# Patient Record
Sex: FEMALE | Race: BLACK OR AFRICAN AMERICAN | NOT HISPANIC OR LATINO | Employment: FULL TIME | URBAN - METROPOLITAN AREA
[De-identification: names, ages, dates, MRNs, and addresses within clinical notes are randomized per-mention and may not be internally consistent; named-entity substitution may affect disease eponyms.]

---

## 2017-03-02 ENCOUNTER — PATIENT MESSAGE (OUTPATIENT)
Dept: FAMILY MEDICINE | Facility: CLINIC | Age: 37
End: 2017-03-02

## 2019-09-18 ENCOUNTER — TELEPHONE (OUTPATIENT)
Dept: BARIATRICS | Facility: CLINIC | Age: 39
End: 2019-09-18

## 2019-09-18 NOTE — TELEPHONE ENCOUNTER
Called patient to schedule annual f/u appt. Left message with call back number. Pt possibly moved.

## 2020-07-17 ENCOUNTER — APPOINTMENT (RX ONLY)
Dept: URBAN - METROPOLITAN AREA CLINIC 108 | Facility: CLINIC | Age: 40
Setting detail: DERMATOLOGY
End: 2020-07-17

## 2020-07-17 DIAGNOSIS — L29.8 OTHER PRURITUS: ICD-10-CM

## 2020-07-17 DIAGNOSIS — L30.4 ERYTHEMA INTERTRIGO: ICD-10-CM

## 2020-07-17 DIAGNOSIS — L29.89 OTHER PRURITUS: ICD-10-CM

## 2020-07-17 PROCEDURE — 99202 OFFICE O/P NEW SF 15 MIN: CPT

## 2020-07-17 PROCEDURE — ? COUNSELING

## 2020-07-17 PROCEDURE — ? PRESCRIPTION

## 2020-07-17 RX ORDER — HYDROCORTISONE 25 MG/G
CREAM TOPICAL
Qty: 1 | Refills: 3 | Status: ERX | COMMUNITY
Start: 2020-07-17

## 2020-07-17 RX ORDER — KETOCONAZOLE 20 MG/G
CREAM TOPICAL
Qty: 1 | Refills: 3 | Status: ERX | COMMUNITY
Start: 2020-07-17

## 2020-07-17 RX ADMIN — KETOCONAZOLE: 20 CREAM TOPICAL at 00:00

## 2020-07-17 RX ADMIN — HYDROCORTISONE: 25 CREAM TOPICAL at 00:00

## 2020-07-17 ASSESSMENT — LOCATION DETAILED DESCRIPTION DERM
LOCATION DETAILED: GLUTEAL CLEFT
LOCATION DETAILED: MONS PUBIS
LOCATION DETAILED: INFERIOR THORACIC SPINE

## 2020-07-17 ASSESSMENT — LOCATION ZONE DERM
LOCATION ZONE: VULVA
LOCATION ZONE: TRUNK

## 2020-07-17 ASSESSMENT — LOCATION SIMPLE DESCRIPTION DERM
LOCATION SIMPLE: UPPER BACK
LOCATION SIMPLE: GROIN
LOCATION SIMPLE: GLUTEAL CLEFT

## 2020-08-22 ENCOUNTER — OFFICE VISIT (OUTPATIENT)
Dept: URGENT CARE | Facility: CLINIC | Age: 40
End: 2020-08-22
Payer: OTHER GOVERNMENT

## 2020-08-22 VITALS
HEART RATE: 72 BPM | TEMPERATURE: 97 F | RESPIRATION RATE: 18 BRPM | HEIGHT: 60 IN | SYSTOLIC BLOOD PRESSURE: 130 MMHG | OXYGEN SATURATION: 97 % | DIASTOLIC BLOOD PRESSURE: 62 MMHG | BODY MASS INDEX: 34.93 KG/M2 | WEIGHT: 177.94 LBS

## 2020-08-22 DIAGNOSIS — G44.209 ACUTE NON INTRACTABLE TENSION-TYPE HEADACHE: ICD-10-CM

## 2020-08-22 DIAGNOSIS — R05.9 COUGH: ICD-10-CM

## 2020-08-22 PROCEDURE — U0003 INFECTIOUS AGENT DETECTION BY NUCLEIC ACID (DNA OR RNA); SEVERE ACUTE RESPIRATORY SYNDROME CORONAVIRUS 2 (SARS-COV-2) (CORONAVIRUS DISEASE [COVID-19]), AMPLIFIED PROBE TECHNIQUE, MAKING USE OF HIGH THROUGHPUT TECHNOLOGIES AS DESCRIBED BY CMS-2020-01-R: HCPCS

## 2020-08-22 PROCEDURE — 99214 OFFICE O/P EST MOD 30 MIN: CPT | Mod: TIER,S$GLB,, | Performed by: NURSE PRACTITIONER

## 2020-08-22 PROCEDURE — 99214 PR OFFICE/OUTPT VISIT, EST, LEVL IV, 30-39 MIN: ICD-10-PCS | Mod: TIER,S$GLB,, | Performed by: NURSE PRACTITIONER

## 2020-08-22 RX ORDER — GABAPENTIN 100 MG/1
CAPSULE ORAL
COMMUNITY
Start: 2020-04-23 | End: 2020-08-25

## 2020-08-22 RX ORDER — MINERAL OIL
180 ENEMA (ML) RECTAL
COMMUNITY
End: 2021-11-11

## 2020-08-22 RX ORDER — ATORVASTATIN CALCIUM 40 MG/1
40 TABLET, FILM COATED ORAL
COMMUNITY
Start: 2019-12-22 | End: 2021-11-02 | Stop reason: SDUPTHER

## 2020-08-22 RX ORDER — ATENOLOL 50 MG/1
TABLET ORAL
COMMUNITY
Start: 2020-07-23 | End: 2021-11-11

## 2020-08-22 RX ORDER — FERROUS SULFATE, DRIED 160(50) MG
1 TABLET, EXTENDED RELEASE ORAL
COMMUNITY
End: 2021-11-11

## 2020-08-22 RX ORDER — FOLIC ACID 1 MG/1
1 TABLET ORAL
COMMUNITY
End: 2020-08-25

## 2020-08-22 NOTE — PATIENT INSTRUCTIONS
Tension Headache    A muscle tension headache is a very common cause of head pain. Its also called a stress headache. When some people are under stress, they tense the muscles of their shoulder, neck, and scalp without knowing it. If this tension lasts long enough, a headache can occur. A tension headache can be quite painful. It can last for hours or even days.  Home care  Follow these tips when caring for yourself at home:  · Dont drive yourself home if you were given pain medicine for your headache. Instead, have someone else drive you home. Try to sleep when you get home. You should feel much better when you wake up.  · Put heat on the back of your neck to help ease neck spasm.  · Drink only clear liquids or eat a light diet until your symptoms get better. This will help you avoid nausea or vomiting.  How to prevent headaches  · Figure out what is causing stress in your life. Learn new ways to handle your stress. Ideas include regular exercise, biofeedback, self-hypnosis, yoga, and meditation. Talk with your healthcare provider to find out more information about managing stress. Many books and digital media are also available on this subject.  · Take time out at the first sign of a tension headache, if possible. Take yourself out of the stressful situation. Find a quiet, comfortable place to sit or lie down and let yourself relax. Heat and deep massage of the tight areas in the neck and shoulders may help ease muscle spasm. You may also get relief from a medicine like ibuprofen or a prescribed muscle relaxant.  Follow-up care  Follow up with your healthcare provider, or as advised. Talk with your provider if you have frequent headaches. He or she can figure out a treatment plan. Ask if you can have medicine to take at home the next time you get a bad headache. This may keep you from having to visit the emergency department in the future. You may need to see a headache specialist (neurologist) if you continue  to have headaches.  When to seek medical advice  Call your healthcare provider right away if any of these occur:  · Your head pain gets worse during sexual intercourse or strenuous activity  · Your head pain doesnt get better within 24 hours  · You arent able to keep liquids down (repeated vomiting)  · Fever of 100.4ºF (38ºC) or higher, or as directed by your healthcare provider  · Stiff neck  · Extreme drowsiness, confusion, or fainting  · Dizziness or dizziness with spinning sensation (vertigo)  · Weakness in an arm or leg or one side of your face  · You have difficulty speaking  · Your vision changes  Date Last Reviewed: 8/1/2016  © 9406-7522 [employer]. [address] [place] [address], [place] [address]. All rights reserved. This information is not intended as a substitute for professional medical care. Always follow your healthcare professional's instructions.

## 2020-08-22 NOTE — PROGRESS NOTES
"Subjective:       Patient ID: Chasity Kwan is a 40 y.o. female.    Vitals:  height is 4' 11.5" (1.511 m) and weight is 80.7 kg (177 lb 14.6 oz). Her temperature is 97.4 °F (36.3 °C). Her blood pressure is 130/62 and her pulse is 72. Her respiration is 18 and oxygen saturation is 97%.     Chief Complaint: Back Pain    Patient here today with c/o HALL x's 2 days, nasal congestion, back pain since Tuesday.    Provider note begins below:    No fever or chills. No cough or SOB. No nausea but several bouts diarrhea. No anosmia or ageusia.    HA not relieved by Tylenol, but better with Aleve. Chronic allergy but worse nasal congestion than typical.    No known sick contacts.    Back Pain  This is a new problem. The current episode started in the past 7 days. The problem is unchanged. The pain is present in the thoracic spine. The quality of the pain is described as aching. The pain does not radiate. The pain is at a severity of 5/10. The pain is moderate. The symptoms are aggravated by bending. Associated symptoms include headaches. Pertinent negatives include no abdominal pain, bladder incontinence, bowel incontinence, chest pain, dysuria, fever, leg pain, numbness, paresis, pelvic pain, perianal numbness, tingling, weakness or weight loss. She has tried NSAIDs for the symptoms. The treatment provided no relief.       Constitution: Negative for chills, fatigue and fever.   HENT: Positive for congestion. Negative for sore throat.    Neck: Negative for painful lymph nodes.   Cardiovascular: Negative for chest pain and leg swelling.   Eyes: Negative for double vision and blurred vision.   Respiratory: Negative for cough and shortness of breath.    Gastrointestinal: Negative for abdominal pain, nausea, vomiting, diarrhea and bowel incontinence.   Genitourinary: Negative for dysuria, frequency, urgency, bladder incontinence, history of kidney stones and pelvic pain.   Musculoskeletal: Positive for back pain. " Negative for joint pain, joint swelling, muscle cramps and muscle ache.   Skin: Negative for color change, pale, rash and bruising.   Allergic/Immunologic: Negative for seasonal allergies.   Neurological: Positive for headaches. Negative for dizziness, history of vertigo, light-headedness, passing out and numbness.   Hematologic/Lymphatic: Negative for swollen lymph nodes.   Psychiatric/Behavioral: Negative for nervous/anxious, sleep disturbance and depression. The patient is not nervous/anxious.        Objective:      Physical Exam   Constitutional: She is oriented to person, place, and time. She appears well-developed. She is cooperative.  Non-toxic appearance. She does not appear ill. No distress.   HENT:   Head: Normocephalic and atraumatic.   Ears:   Right Ear: Hearing and external ear normal.   Left Ear: Hearing and external ear normal.   Nose: Mucosal edema present. No rhinorrhea or nasal deformity. No epistaxis.   Mouth/Throat: Uvula is midline, oropharynx is clear and moist and mucous membranes are normal. No trismus in the jaw. Normal dentition. No uvula swelling. No oropharyngeal exudate, posterior oropharyngeal edema or posterior oropharyngeal erythema.   Eyes: Conjunctivae and lids are normal. No scleral icterus. extraocular movement intactvision grossly intact  Neck: Trachea normal, full passive range of motion without pain and phonation normal. Neck supple. No neck rigidity. No edema and no erythema present.   Cardiovascular: Normal rate, regular rhythm, S1 normal, S2 normal, normal heart sounds and normal pulses.   No murmur heard.  Pulmonary/Chest: Effort normal and breath sounds normal. No stridor. No respiratory distress. She has no decreased breath sounds. She has no wheezes. She has no rhonchi. She has no rales.   Abdominal: Normal appearance.   Musculoskeletal: Normal range of motion.         General: No deformity.   Neurological: She is alert and oriented to person, place, and time. She has  intact cranial nerves. She displays no weakness, no tremor and facial symmetry. She exhibits normal muscle tone. Gait normal. Coordination normal.   Skin: Skin is warm, dry, intact, not diaphoretic and not pale. Psychiatric: Her speech is normal and behavior is normal. Judgment and thought content normal.   Nursing note and vitals reviewed.        Assessment:       1. Cough    2. Acute non intractable tension-type headache        Plan:         Cough  -     COVID-19 Routine Screening    Acute non intractable tension-type headache  -     COVID-19 Routine Screening      Patient Instructions     Tension Headache    A muscle tension headache is a very common cause of head pain. Its also called a stress headache. When some people are under stress, they tense the muscles of their shoulder, neck, and scalp without knowing it. If this tension lasts long enough, a headache can occur. A tension headache can be quite painful. It can last for hours or even days.  Home care  Follow these tips when caring for yourself at home:  · Dont drive yourself home if you were given pain medicine for your headache. Instead, have someone else drive you home. Try to sleep when you get home. You should feel much better when you wake up.  · Put heat on the back of your neck to help ease neck spasm.  · Drink only clear liquids or eat a light diet until your symptoms get better. This will help you avoid nausea or vomiting.  How to prevent headaches  · Figure out what is causing stress in your life. Learn new ways to handle your stress. Ideas include regular exercise, biofeedback, self-hypnosis, yoga, and meditation. Talk with your healthcare provider to find out more information about managing stress. Many books and digital media are also available on this subject.  · Take time out at the first sign of a tension headache, if possible. Take yourself out of the stressful situation. Find a quiet, comfortable place to sit or lie down and let  yourself relax. Heat and deep massage of the tight areas in the neck and shoulders may help ease muscle spasm. You may also get relief from a medicine like ibuprofen or a prescribed muscle relaxant.  Follow-up care  Follow up with your healthcare provider, or as advised. Talk with your provider if you have frequent headaches. He or she can figure out a treatment plan. Ask if you can have medicine to take at home the next time you get a bad headache. This may keep you from having to visit the emergency department in the future. You may need to see a headache specialist (neurologist) if you continue to have headaches.  When to seek medical advice  Call your healthcare provider right away if any of these occur:  · Your head pain gets worse during sexual intercourse or strenuous activity  · Your head pain doesnt get better within 24 hours  · You arent able to keep liquids down (repeated vomiting)  · Fever of 100.4ºF (38ºC) or higher, or as directed by your healthcare provider  · Stiff neck  · Extreme drowsiness, confusion, or fainting  · Dizziness or dizziness with spinning sensation (vertigo)  · Weakness in an arm or leg or one side of your face  · You have difficulty speaking  · Your vision changes  Date Last Reviewed: 8/1/2016  © 9567-8669 [employer]. [address] [place] [address], [place] [address]. All rights reserved. This information is not intended as a substitute for professional medical care. Always follow your healthcare professional's instructions.

## 2020-08-23 LAB — SARS-COV-2 RNA RESP QL NAA+PROBE: NOT DETECTED

## 2020-08-24 ENCOUNTER — TELEPHONE (OUTPATIENT)
Dept: NEUROLOGY | Facility: CLINIC | Age: 40
End: 2020-08-24

## 2020-08-24 NOTE — TELEPHONE ENCOUNTER
----- Message from Nikkie Marquis sent at 8/24/2020  1:37 PM CDT -----  Pt was a prior pt moved back to [place]. Would like to schedule an appt. Asking for a call back.      Contact info [phone]

## 2020-08-25 ENCOUNTER — OFFICE VISIT (OUTPATIENT)
Dept: NEUROLOGY | Facility: CLINIC | Age: 40
End: 2020-08-25
Payer: OTHER GOVERNMENT

## 2020-08-25 ENCOUNTER — TELEPHONE (OUTPATIENT)
Dept: URGENT CARE | Facility: CLINIC | Age: 40
End: 2020-08-25

## 2020-08-25 VITALS
SYSTOLIC BLOOD PRESSURE: 116 MMHG | WEIGHT: 188.94 LBS | HEART RATE: 66 BPM | HEIGHT: 60 IN | DIASTOLIC BLOOD PRESSURE: 85 MMHG | BODY MASS INDEX: 37.09 KG/M2

## 2020-08-25 DIAGNOSIS — G93.2 PSEUDOTUMOR CEREBRI: Primary | ICD-10-CM

## 2020-08-25 PROCEDURE — 99204 PR OFFICE/OUTPT VISIT, NEW, LEVL IV, 45-59 MIN: ICD-10-PCS | Mod: S$PBB,,, | Performed by: NEUROMUSCULOSKELETAL MEDICINE & OMM

## 2020-08-25 PROCEDURE — 99204 OFFICE O/P NEW MOD 45 MIN: CPT | Mod: S$PBB,,, | Performed by: NEUROMUSCULOSKELETAL MEDICINE & OMM

## 2020-08-25 PROCEDURE — 99999 PR PBB SHADOW E&M-EST. PATIENT-LVL III: CPT | Mod: PBBFAC,,, | Performed by: NEUROMUSCULOSKELETAL MEDICINE & OMM

## 2020-08-25 PROCEDURE — 99999 PR PBB SHADOW E&M-EST. PATIENT-LVL III: ICD-10-PCS | Mod: PBBFAC,,, | Performed by: NEUROMUSCULOSKELETAL MEDICINE & OMM

## 2020-08-25 PROCEDURE — 99213 OFFICE O/P EST LOW 20 MIN: CPT | Mod: PBBFAC,PO | Performed by: NEUROMUSCULOSKELETAL MEDICINE & OMM

## 2020-08-25 RX ORDER — LAMOTRIGINE 100 MG/1
TABLET ORAL
COMMUNITY
Start: 2020-06-24 | End: 2021-11-11

## 2020-08-25 RX ORDER — BUTALBITAL, ACETAMINOPHEN AND CAFFEINE 50; 325; 40 MG/1; MG/1; MG/1
1 TABLET ORAL
COMMUNITY
Start: 2020-08-23 | End: 2020-09-12

## 2020-08-25 NOTE — TELEPHONE ENCOUNTER
Called patient to follow up from her visit on 8/22. Patient stated she was at neurologist appointment

## 2020-08-25 NOTE — PROGRESS NOTES
This note was dictated with Modal Fluency a word recognition program. There are occasionally word recognition errors which are missed on review.  Chasitygilbert Sevilla Aquiles  1980  Review of patient's allergies indicates:  No Known Allergies  [unfilled]    Past Medical History:   Diagnosis Date    Abnormal Pap smear 2009    colpo    Genital herpes in women     History of uterine fibroid     Hyperlipidemia     Hypertension     Morbid obesity     Pseudotumor cerebri      Social History     Socioeconomic History    Marital status: Single     Spouse name: Not on file    Number of children: Not on file    Years of education: Not on file    Highest education level: Not on file   Occupational History     Employer: [employer]   Social Needs    Financial resource strain: Not on file    Food insecurity     Worry: Not on file     Inability: Not on file    Transportation needs     Medical: Not on file     Non-medical: Not on file   Tobacco Use    Smoking status: Never Smoker    Smokeless tobacco: Never Used   Substance and Sexual Activity    Alcohol use: No     Comment: occasionally    Drug use: No    Sexual activity: Yes     Partners: Male     Birth control/protection: OCP   Lifestyle    Physical activity     Days per week: Not on file     Minutes per session: Not on file    Stress: Not on file   Relationships    Social connections     Talks on phone: Not on file     Gets together: Not on file     Attends Jainism service: Not on file     Active member of club or organization: Not on file     Attends meetings of clubs or organizations: Not on file     Relationship status: Not on file   Other Topics Concern    Not on file   Social History Narrative    Not on file     Family History   Problem Relation Age of Onset    Hypertension Mother     Hyperlipidemia Mother     Graves' disease Mother     Obesity Mother     Hyperlipidemia Sister     Obesity Sister     Colon cancer Father 70     Cataracts Father     Cancer Father         colon, liver, and stomach    Breast cancer Neg Hx     Ovarian cancer Neg Hx     Heart disease Maternal Grandfather     Stroke Maternal Grandmother     Diabetes Paternal Grandmother     No Known Problems Paternal Grandfather        Review of systems:  Constitutional-negative  Eyes-negative  ENT, mouth-negative  Cardiovascular-negative  Respiratory-negative  GI-negative  - negative  Musculoskeletal-negative  Skin-negative  Neurologic-negative  Psychiatric-negative  Endocrine-negative  Hematology/lymph nodes-negative  Allergies/immunology-negative  Chasity Dinaya Kwan  1980  Review of patient's allergies indicates:  No Known Allergies  [unfilled]    Past Medical History:   Diagnosis Date    Abnormal Pap smear 2009    colpo    Genital herpes in women     History of uterine fibroid     Hyperlipidemia     Hypertension     Morbid obesity     Pseudotumor cerebri      Social History     Socioeconomic History    Marital status: Single     Spouse name: Not on file    Number of children: Not on file    Years of education: Not on file    Highest education level: Not on file   Occupational History     Employer: [employer]   Social Needs    Financial resource strain: Not on file    Food insecurity     Worry: Not on file     Inability: Not on file    Transportation needs     Medical: Not on file     Non-medical: Not on file   Tobacco Use    Smoking status: Never Smoker    Smokeless tobacco: Never Used   Substance and Sexual Activity    Alcohol use: No     Comment: occasionally    Drug use: No    Sexual activity: Yes     Partners: Male     Birth control/protection: OCP   Lifestyle    Physical activity     Days per week: Not on file     Minutes per session: Not on file    Stress: Not on file   Relationships    Social connections     Talks on phone: Not on file     Gets together: Not on file     Attends Jain service: Not on file      Active member of club or organization: Not on file     Attends meetings of clubs or organizations: Not on file     Relationship status: Not on file   Other Topics Concern    Not on file   Social History Narrative    Not on file     Family History   Problem Relation Age of Onset    Hypertension Mother     Hyperlipidemia Mother     Graves' disease Mother     Obesity Mother     Hyperlipidemia Sister     Obesity Sister     Colon cancer Father 70    Cataracts Father     Cancer Father         colon, liver, and stomach    Breast cancer Neg Hx     Ovarian cancer Neg Hx     Heart disease Maternal Grandfather     Stroke Maternal Grandmother     Diabetes Paternal Grandmother     No Known Problems Paternal Grandfather        Review of systems:  Constitutional-negative  Eyes-negative  ENT, mouth-negative  Cardiovascular-negative  Respiratory-negative  GI-negative  - negative  Musculoskeletal-negative  Skin-negative  Neurologic-negative  Psychiatric-negative  Endocrine-negative  Hematology/lymph nodes-negative  Allergies/immunology-negative  Gen. 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  Present history:   This is a 40-year-old  female who presents with a history of pseudotumor cerebri.  Patient was diagnosed in 2001 while living in [facility] by Dr. Leyla Xavier who is considered 1 of the top notch neuro ophthalmologist in the country.  Patient initially presented with eye pain and double vision and was treated with Diamox after spinal tap.  Patient eventually got better and was taken off medication until a recurrence in 2016 while in [place].  She was again treated with Diamox for 1 year and this was stopped after she got better.  Patient is been symptom free up until  last week when she describes having ovary occurrence.  She describes neck and shoulder pain not relieved by Tylenol or Aleve.  She was also given Fiorinal by the urgent care center with no relief.  She is actually scheduled to see Dr. Prince Diez tomorrow.  She has no loss of vision and headache is described as radiating from the neck to the top of the head with presently 2/10 intensity.  Sitting or standing aggravates the headache to an 8/10.  She has no trouble sleeping at night.  She has had an MRI of the cervical spine in the last year diagnosed with degenerative joint disease.  She takes Klonopin 0.5 mg at night for anxiety.  Neurological Exam:  Mental status-alert and oriented to person, place, and time; attention span and concentration is good. Fund of knowledge-patient is aware of current events and able to give detailed history of the current problem.recent and remote memory seems intact. Language function is normal with no evidence of aphasia  Cranial nerves:Visual acuity to hand chart -normal; visual fields to confrontation normal;pupils were equal and reactive to light ;no evidence of ptosis ;  funduscopic examination shows indistinct disc margins however difficult to see in the bright clinic light.  external ocular movements were full with no nystagmus. Facial sensation to pinprick : normal ; corneal reflexes intact; Facial muscles were symmetrical. Hearing is unimpaired symmetrical finger rub; Tongue movements - normal ; palate movements - normal ;Swallowing unimpaired. Shoulder shrug was intact with good strength Speech was normal  Motor examination: Upper : normal                                      Lower extremities - Normal;muscle tone was normal ;                  Right-handed  Sensory examination:   Upper; normal pinprick and soft touch ;   Lower extremities - normal and symmetrical.   Vibration sense: 15-20 seconds @ toes  Deep tendon reflexes: upper extremities :1-2+ symmetrical ;     lower  extremities KJ- 1-2 +; AJ - 1-2+ Both plantar responses were flexor  Cerebellar examination upper: Normal finger to nose and rapid alternating movements  Gait: Steady with no ataxia;      heel and toe walk normal  Romberg test: negative       Tandem gait: Normal    Involuntary movements: Negative  TMJ - no tenderness  Cervical examination: Full range of motion with no pain Cervical tenderness bilateral positive to palpation   Lumbar examination: Low back tenderness-negative                  Sciatic notchtenderness-negative            Straight leg raising : negative    Impression:  Pseudotumor cerebral by history; cervical syndrome    Recommendations/Plan :  Long discussion with the patient in terms of differential diagnosis of cervical syndrome and/were pseudotumor cerebri.  Have suggested patient increase the Klonopin to double the dose at night which should hold her until she sees Dr. Diez tomorrow.  Will follow up in 6 months if needed.

## 2020-08-26 ENCOUNTER — OFFICE VISIT (OUTPATIENT)
Dept: OPHTHALMOLOGY | Facility: CLINIC | Age: 40
End: 2020-08-26

## 2020-08-26 ENCOUNTER — CLINICAL SUPPORT (OUTPATIENT)
Dept: OPHTHALMOLOGY | Facility: CLINIC | Age: 40
End: 2020-08-26

## 2020-08-26 DIAGNOSIS — H47.20 OPTIC ATROPHY SECONDARY TO PAPILLEDEMA: Primary | ICD-10-CM

## 2020-08-26 DIAGNOSIS — H47.10 OPTIC ATROPHY SECONDARY TO PAPILLEDEMA: Primary | ICD-10-CM

## 2020-08-26 PROCEDURE — 92250 FUNDUS PHOTOGRAPHY W/I&R: CPT | Mod: PBBFAC | Performed by: OPHTHALMOLOGY

## 2020-08-26 PROCEDURE — 92250 COLOR FUNDUS PHOTOGRAPHY - OU - BOTH EYES: ICD-10-PCS | Mod: 26,S$PBB,, | Performed by: OPHTHALMOLOGY

## 2020-08-26 PROCEDURE — 99211 OFF/OP EST MAY X REQ PHY/QHP: CPT | Mod: PBBFAC,27,25

## 2020-08-26 PROCEDURE — 99999 PR PBB SHADOW E&M-EST. PATIENT-LVL I: ICD-10-PCS | Mod: PBBFAC,,,

## 2020-08-26 PROCEDURE — 99213 OFFICE O/P EST LOW 20 MIN: CPT | Mod: PBBFAC | Performed by: OPHTHALMOLOGY

## 2020-08-26 PROCEDURE — 92004 COMPRE OPH EXAM NEW PT 1/>: CPT | Mod: S$PBB,,, | Performed by: OPHTHALMOLOGY

## 2020-08-26 PROCEDURE — 99999 PR PBB SHADOW E&M-EST. PATIENT-LVL I: CPT | Mod: PBBFAC,,,

## 2020-08-26 PROCEDURE — 92083 EXTENDED VISUAL FIELD XM: CPT | Mod: PBBFAC | Performed by: OPHTHALMOLOGY

## 2020-08-26 PROCEDURE — 92004 PR EYE EXAM, NEW PATIENT,COMPREHESV: ICD-10-PCS | Mod: S$PBB,,, | Performed by: OPHTHALMOLOGY

## 2020-08-26 PROCEDURE — 99999 PR PBB SHADOW E&M-EST. PATIENT-LVL III: CPT | Mod: PBBFAC,,, | Performed by: OPHTHALMOLOGY

## 2020-08-26 PROCEDURE — 99999 PR PBB SHADOW E&M-EST. PATIENT-LVL III: ICD-10-PCS | Mod: PBBFAC,,, | Performed by: OPHTHALMOLOGY

## 2020-08-26 PROCEDURE — 92083 HUMPHREY VISUAL FIELD - OU - BOTH EYES: ICD-10-PCS | Mod: 26,S$PBB,, | Performed by: OPHTHALMOLOGY

## 2020-08-26 NOTE — PROGRESS NOTES
HPI     39 YO female here for pseudo tumor cerebri. Pt reports she has been dx   with pseudo tumor cerebri in the past. Last Tuesday, pt experienced back,   neck, and head pain. Went urgent care on Saturday and took migraine   medication however very little relief. Pt reports residual eye pain. This   is 4th recurrant episode. Also reports photophobic. Saw Neurologist, Dr. Gaspar on yesterday. Faxed notes from Dr. Lujan in [place]. No flashes nor   other ocular complaints.     I have personally interviewed the patient, reviewed the history and   examined the patient and agree with the technician's exam.    Last edited by Prince Diez MD on 8/26/2020  2:45 PM. (History)            Assessment /Plan     For exam results, see Encounter Report.    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 Kwan shows no active papilledema to indicate increased intracranial pressure as a cause for her headaches. I will refer her to Dr. Andrew Lowe for  Headache management. She will return to me as needed.

## 2020-08-26 NOTE — LETTER
[facility] [address]  [address]  [facility] [address]  Phone: [phone]  Fax: [phone]   August 26, 2020    Catrachito Gaspar MD  [address] [facility] [place] [address]    Patient: Chasity Kwan   MR Number: [id number]   YOB: 1980   Date of Visit: 8/26/2020       Dear Dr. Gaspar:    Thank you for referring Chasity Kwan to me for evaluation. Here is my assessment and plan of care:    Assessment/Plan     For exam results, see Encounter Report.    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 Kwan shows no active papilledema to indicate increased intracranial pressure as a cause for her headaches. I will refer her to Dr. Andrew Lowe for  Headache management. She will return to me as needed.          Below you will find my full exam findings. If you have questions, please do not hesitate to call me. I look forward to following Ms. Chasity Kwan along with you.    Sincerely,          Prince Diez MD       CC  Andrew Lowe MD             Base Eye Exam     Visual Acuity (Snellen - Linear)       Right Left    Dist cc 20/20 20/20    Correction: Contacts          Tonometry (Applanation, 2:59 PM)       Right Left    Pressure 17 16          Pupils       Dark Light Shape React APD    Right 5 3 Round Brisk None    Left 5 3 Round Brisk None          Visual Fields    See HVF report.           Extraocular Movement       Right Left     Full, Ortho Full, Ortho          Dilation     Both eyes: 1% Mydriacyl, 2.5% Phenylephrine @ 3:00 PM            Additional Tests     Color       Right Left    Ishihara 12/12 12/12            Slit Lamp and Fundus Exam     Slit Lamp Exam       Right Left    Lids/Lashes Normal Normal    Conjunctiva/Sclera White and quiet  White and quiet    Cornea Clear Clear    Anterior Chamber Deep and quiet Deep and quiet    Iris Round and reactive Round and reactive    Lens Clear Clear    Vitreous Normal Normal          Fundus Exam       Right Left    Disc No edema. Focal pallor. No edema. Focal pallor.    C/D Ratio 0.3 0.3    Macula Normal Normal    Vessels Normal Normal    Periphery Normal Normal

## 2020-08-27 ENCOUNTER — TELEPHONE (OUTPATIENT)
Dept: NEUROLOGY | Facility: CLINIC | Age: 40
End: 2020-08-27

## 2020-08-27 NOTE — TELEPHONE ENCOUNTER
----- Message from Temi Haddad sent at 8/27/2020 10:33 AM CDT -----  Contact: [phone]  Type:  Needs Medical Advice    Who Called: PT  Reason for Call: regarding scheduling a new pt appt for Severe Headaches, pt has a referral and the referral is still pending  Would the patient rather a call back or a response via [facility]? Call back  Best Call Back Number: [phone]

## 2020-08-28 ENCOUNTER — TELEPHONE (OUTPATIENT)
Dept: PRIMARY CARE CLINIC | Facility: CLINIC | Age: 40
End: 2020-08-28

## 2020-08-28 NOTE — TELEPHONE ENCOUNTER
Pt called for an appt with Dr. Vitale, explained there is a process for seeing providers in this clinic. Pt verbalized understanding. She will find a different provider.

## 2020-09-01 ENCOUNTER — OFFICE VISIT (OUTPATIENT)
Dept: NEUROLOGY | Facility: CLINIC | Age: 40
End: 2020-09-01
Payer: COMMERCIAL

## 2020-09-01 DIAGNOSIS — H47.20 OPTIC ATROPHY SECONDARY TO PAPILLEDEMA: ICD-10-CM

## 2020-09-01 DIAGNOSIS — G93.2 IIH (IDIOPATHIC INTRACRANIAL HYPERTENSION): ICD-10-CM

## 2020-09-01 DIAGNOSIS — H47.10 OPTIC ATROPHY SECONDARY TO PAPILLEDEMA: ICD-10-CM

## 2020-09-01 DIAGNOSIS — G44.40 MEDICATION OVERUSE HEADACHE: Primary | ICD-10-CM

## 2020-09-01 PROCEDURE — 99214 OFFICE O/P EST MOD 30 MIN: CPT | Mod: 95,,, | Performed by: PSYCHIATRY & NEUROLOGY

## 2020-09-01 PROCEDURE — 99214 PR OFFICE/OUTPT VISIT, EST, LEVL IV, 30-39 MIN: ICD-10-PCS | Mod: 95,,, | Performed by: PSYCHIATRY & NEUROLOGY

## 2020-09-01 RX ORDER — CYCLOBENZAPRINE HCL 5 MG
5 TABLET ORAL 3 TIMES DAILY PRN
Qty: 30 TABLET | Refills: 2 | Status: SHIPPED | OUTPATIENT
Start: 2020-09-01 | End: 2020-09-11

## 2020-09-01 RX ORDER — METHYLPREDNISOLONE 4 MG/1
TABLET ORAL
Qty: 1 PACKAGE | Refills: 0 | Status: SHIPPED | OUTPATIENT
Start: 2020-09-01 | End: 2021-11-30

## 2020-09-01 NOTE — PROGRESS NOTES
[facility] Department of Neurology  Virtual Visit      [facility] NEUROLOGY  [facility], [place]    Date: 9/1/20  Patient Name: Chasity Kwan   MRN: [id number]   PCP: Almas Kramer  Referring Provider: Prince Diez MD    The patient location is: Home  The chief complaint leading to consultation is: Headace  Visit type: Virtual visit with synchronous audio and video  Total time spent with patient: 11 minutes  Each patient to whom he or she provides medical services by telemedicine is:  (1) informed of the relationship between the physician and patient and the respective role of any other health care provider with respect to management of the patient; and (2) notified that he or she may decline to receive medical services by telemedicine and may withdraw from such care at any time.    Notes:    Assessment:   Chasity Kwan is a 40 y.o. female Presenting in follow-up for management of new onset headache.  Reviewed eval from Neuro-Ophthalmology.  No evidence exacerbation of pseudotumor cerebri at this time.  Clinical history of headache most consistent with migraine.  Additionally, patient engaging in significant analgesic overuse.  Counseled patient on appropriate use OTC analgesics.  She expressed understanding that she needs to use no more than 3 doses per week.  Will discontinue Aleve overuse and have provided Medrol Dosepak and Flexeril for withdrawal./management of status migrainosus.  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  Any errors  in syntax or even information may not have been identified and edited on initial review prior to signing this note.  Subjective:   Patient seen in consultation at the request of Prince Diez MD for the evaluation of headache. A copy of this note will be sent to the referring physician.      HPI:   Ms. Chasity Kwan is a 40 y.o. female  female with a history pseudotumor cerebral and not currently on Diamox presenting for new onset headache.  The patient reports that several weeks ago, she developed onset of a unilateral headache with associated photophobia, phonophobia, and nausea.  She states that sitting up and being active exacerbates the headache and lying down quietly alleviates the headache.  She has tried numerous over-the-counter analgesics including Tylenol and Aleve without improvement and is also tried doses of Fioricet without improvement in the headache. She does note associated neck muscle tension and soreness.  She was evaluated by Neuro-Ophthalmology by Dr. Diez who saw no evidence of papilledema.  Since that time, the patient reports that she has been taking at least 1 dose daily, sometimes more.    PAST MEDICAL HISTORY:  Past Medical History:   Diagnosis Date    Abnormal Pap smear 2009    colpo    Genital herpes in women     History of uterine fibroid     Hyperlipidemia     Hypertension     Morbid obesity     Pseudotumor cerebri        PAST SURGICAL HISTORY:  Past Surgical History:   Procedure Laterality Date    ESOPHAGOGASTRODUODENOSCOPY      GASTRECTOMY      SI joint injection         CURRENT MEDS:  Current Outpatient Medications   Medication Sig Dispense Refill    atenoloL (TENORMIN) 50 MG tablet TAKE 1 TABLET(50 MG) BY MOUTH DAILY      atorvastatin (LIPITOR) 40 MG tablet Take 40 mg by mouth.      butalbital-acetaminophen-caffeine -40 mg (FIORICET, ESGIC) -40 mg per tablet Take 1 tablet by mouth.      calcium-vitamin D3 (OS-FRANKIE 500 + D3) 500  mg(1,250mg) -200 unit per tablet Take 1 tablet by mouth.      clonazePAM (KLONOPIN) 0.5 MG tablet Take 0.5 mg by mouth daily as needed.   1    cyclobenzaprine (FLEXERIL) 5 MG tablet Take 1 tablet (5 mg total) by mouth 3 (three) times daily as needed for Muscle spasms (Neck pain). 30 tablet 2    drospirenone-ethinyl estradiol (TIFFANIE) 3-0.02 mg per tablet Take 1 tablet by mouth once daily. 84 tablet 4    fexofenadine (ALLEGRA) 180 MG tablet Take 180 mg by mouth.      lamoTRIgine (LAMICTAL) 100 MG tablet       methylPREDNISolone (MEDROL DOSEPACK) 4 mg tablet use as directed 1 Package 0    multivitamin (ONE DAILY MULTIVITAMIN) per tablet Take 1 tablet by mouth once daily. Patient to stop[ 1 week before surgery starting 3/18/15      omeprazole (PRILOSEC) 40 MG capsule Take 1 capsule (40 mg total) by mouth every morning. Open capsule and take contents by mouth 30 capsule 11    sertraline (ZOLOFT) 100 MG tablet Take 1 tablet (100 mg total) by mouth once daily. 30 tablet 11    valacyclovir (VALTREX) 500 MG tablet Take 1 tablet (500 mg total) by mouth once daily. (Patient taking differently: Take 500 mg by mouth daily as needed. ) 30 tablet 0    zolpidem (AMBIEN CR) 6.25 MG CR tablet Take 1 tablet by mouth every evening.   2     No current facility-administered medications for this visit.        ALLERGIES:  Review of patient's allergies indicates:  No Known Allergies    FAMILY HISTORY:  Family History   Problem Relation Age of Onset    Hypertension Mother     Hyperlipidemia Mother     Graves' disease Mother     Obesity Mother     Hyperlipidemia Sister     Obesity Sister     Colon cancer Father 70    Cataracts Father     Cancer Father         colon, liver, and stomach    Breast cancer Neg Hx     Ovarian cancer Neg Hx     Heart disease Maternal Grandfather     Stroke Maternal Grandmother     Diabetes Paternal Grandmother     No Known Problems Paternal Grandfather        SOCIAL HISTORY:  Social History      Tobacco Use    Smoking status: Never Smoker    Smokeless tobacco: Never Used   Substance Use Topics    Alcohol use: No     Comment: occasionally    Drug use: No       Review of Systems:  12 system review of systems is negative except for the symptoms mentioned in HPI.      Objective:   There were no vitals filed for this visit.  General: NAD, well nourished   Eyes: no tearing, discharge, no erythema   ENT: moist mucous membranes of the oral cavity, nares patent    Neck: Supple, full range of motion  Cardiovascular: Appears well perfused  Lungs: Normal work of breathing, normal chest wall excursions  Skin: No rash, lesions, or breakdown on exposed skin  Psychiatry: Mood and affect are appropriate   Abdomen: nondistended, non tender  Extremeties: No cyanosis, clubbing or edema visible    Neurological   MENTAL STATUS: Alert and oriented to person, place, and time. Attention and concentration within normal limits. Speech without dysarthria, able to name and repeat without difficulty. Recent and remote memory within normal limits   CRANIAL NERVES: Visual fields intact. PERRL. EOMI. Facial sensation intact. Face symmetrical. Hearing grossly intact. Full shoulder shrug bilaterally. Tongue protrudes midline   SENSORY: Sensation is intact to light touch throughout.  MOTOR: Normal bulk No pronator drift.  5/5 deltoid, biceps, triceps, interosseous, hand  bilaterally. 5/5 iliopsoas, knee extension/flexion, foot dorsi/plantarflexion bilaterally.    REFLEXES: Deferred due to virtual visit  CEREBELLAR/COORDINATION/GAIT: Gait steady with normal arm swing and stride length.  Finger to nose intact. Normal rapid alternating movements.

## 2020-09-01 NOTE — LETTER
September 1, 2020      Prince Diez MD  [address]  [facility] [address]           [facility] Neurology  [address]  [facility] [address]  Phone: [phone]  Fax: [phone]          Patient: Chasity Kwan   MR Number: [id number]   YOB: 1980   Date of Visit: 9/1/2020       Dear Dr. Prince Diez:    Thank you for referring Chasity Kwan to me for evaluation. Attached you will find relevant portions of my assessment and plan of care.    If you have questions, please do not hesitate to call me. I look forward to following Chasity Kwan along with you.    Sincerely,    Tesfaye Santoyo MD    Enclosure  CC:  No Recipients    If you would like to receive this communication electronically, please contact [email or web address] or [phone] to request more information on [facility] [employer] access.    For providers and/or their staff who would like to refer a patient to [facility], please contact us through our one-stop-shop provider referral line, [facility], at [phone].    If you feel you have received this communication in error or would no longer like to receive these types of communications, please e-mail [email or web address]

## 2020-09-11 ENCOUNTER — OFFICE VISIT (OUTPATIENT)
Dept: OBSTETRICS AND GYNECOLOGY | Facility: CLINIC | Age: 40
End: 2020-09-11
Payer: COMMERCIAL

## 2020-09-11 VITALS
BODY MASS INDEX: 36.92 KG/M2 | WEIGHT: 188.06 LBS | DIASTOLIC BLOOD PRESSURE: 72 MMHG | HEIGHT: 60 IN | SYSTOLIC BLOOD PRESSURE: 120 MMHG

## 2020-09-11 DIAGNOSIS — Z30.9 ENCOUNTER FOR CONTRACEPTIVE MANAGEMENT, UNSPECIFIED TYPE: ICD-10-CM

## 2020-09-11 DIAGNOSIS — D21.9 FIBROID: ICD-10-CM

## 2020-09-11 DIAGNOSIS — Z01.419 ENCOUNTER FOR GYNECOLOGICAL EXAMINATION WITHOUT ABNORMAL FINDING: Primary | ICD-10-CM

## 2020-09-11 DIAGNOSIS — N94.3 PMS (PREMENSTRUAL SYNDROME): ICD-10-CM

## 2020-09-11 DIAGNOSIS — N89.8 VAGINAL ODOR: ICD-10-CM

## 2020-09-11 DIAGNOSIS — Z12.39 BREAST CANCER SCREENING: ICD-10-CM

## 2020-09-11 PROCEDURE — 88175 CYTOPATH C/V AUTO FLUID REDO: CPT

## 2020-09-11 PROCEDURE — 99999 PR PBB SHADOW E&M-EST. PATIENT-LVL IV: CPT | Mod: PBBFAC,,, | Performed by: OBSTETRICS & GYNECOLOGY

## 2020-09-11 PROCEDURE — 99999 PR PBB SHADOW E&M-EST. PATIENT-LVL IV: ICD-10-PCS | Mod: PBBFAC,,, | Performed by: OBSTETRICS & GYNECOLOGY

## 2020-09-11 PROCEDURE — 99386 PR PREVENTIVE VISIT,NEW,40-64: ICD-10-PCS | Mod: S$PBB,,, | Performed by: OBSTETRICS & GYNECOLOGY

## 2020-09-11 PROCEDURE — 99214 OFFICE O/P EST MOD 30 MIN: CPT | Mod: PBBFAC,PN | Performed by: OBSTETRICS & GYNECOLOGY

## 2020-09-11 PROCEDURE — 99386 PREV VISIT NEW AGE 40-64: CPT | Mod: S$PBB,,, | Performed by: OBSTETRICS & GYNECOLOGY

## 2020-09-11 RX ORDER — ACETAMINOPHEN AND CODEINE PHOSPHATE 120; 12 MG/5ML; MG/5ML
1 SOLUTION ORAL DAILY
Qty: 28 TABLET | Refills: 11 | Status: SHIPPED | OUTPATIENT
Start: 2020-09-11 | End: 2021-11-11

## 2020-09-11 RX ORDER — METRONIDAZOLE 500 MG/1
500 TABLET ORAL 2 TIMES DAILY
Qty: 14 TABLET | Refills: 0 | Status: SHIPPED | OUTPATIENT
Start: 2020-09-11 | End: 2020-09-25

## 2020-09-11 NOTE — PROGRESS NOTES
"CC: Well woman exam    Chasity Kwan is a 40 y.o. female  presents for a well woman exam.  She has vaginal odor  She was at the beach.   She moved back from [place].       Past Medical History:   Diagnosis Date    Abnormal Pap smear     colpo    Genital herpes in women     H/O: myomectomy     EX lap    History of uterine fibroid     Hyperlipidemia     Hypertension     Morbid obesity     Pseudotumor cerebri        Past Surgical History:   Procedure Laterality Date    ESOPHAGOGASTRODUODENOSCOPY      GASTRECTOMY      LAPAROTOMY      SI joint injection         OB History    Para Term  AB Living   0 0 0 0 0 0   SAB TAB Ectopic Multiple Live Births   0 0 0 0         Family History   Problem Relation Age of Onset    Hypertension Mother     Hyperlipidemia Mother     Graves' disease Mother     Obesity Mother     Hyperlipidemia Sister     Obesity Sister     Colon cancer Father 70    Cataracts Father     Cancer Father         colon, liver, and stomach    Heart disease Maternal Grandfather     Stroke Maternal Grandmother     Diabetes Paternal Grandmother     No Known Problems Paternal Grandfather     Breast cancer Neg Hx     Ovarian cancer Neg Hx        Social History     Tobacco Use    Smoking status: Never Smoker    Smokeless tobacco: Never Used   Substance Use Topics    Alcohol use: No     Comment: occasionally    Drug use: No       /72   Ht 4' 11.5" (1.511 m)   Wt 85.3 kg (188 lb 0.8 oz)   LMP  (LMP Unknown)   BMI 37.35 kg/m²     ROS:  GENERAL: Denies weight gain or weight loss. Feeling well overall.   SKIN: Denies rash or lesions.   HEAD: Denies head injury or headache.   NODES: Denies enlarged lymph nodes.   CHEST: Denies chest pain or shortness of breath.   CARDIOVASCULAR: Denies palpitations or left sided chest pain.   ABDOMEN: No abdominal pain, constipation, diarrhea, nausea, vomiting or rectal bleeding.   URINARY: No frequency, dysuria, " hematuria, or burning on urination.  REPRODUCTIVE: See HPI.   BREASTS: The patient performs breast self-examination and denies pain, lumps, or nipple discharge.   HEMATOLOGIC: No easy bruisability or excessive bleeding.  MUSCULOSKELETAL: Denies joint pain or swelling.   NEUROLOGIC: Denies syncope or weakness.   PSYCHIATRIC: Denies depression, anxiety or mood swings.    Physical Exam:    APPEARANCE: Well nourished, well developed, in no acute distress.  AFFECT: WNL, alert and oriented x 3  SKIN: No acne or hirsutism  NECK: Neck symmetric without masses or thyromegaly  NODES: No inguinal, cervical, axillary, or femoral lymph node enlargement  CHEST: Good respiratory effect  ABDOMEN: Soft.  No tenderness or masses.  No hepatosplenomegaly.  No hernias.  BREASTS: Symmetrical, no skin changes or visible lesions.  No palpable masses, nipple discharge bilaterally.  PELVIC: Normal external genitalia without lesions.  Normal hair distribution.  Adequate perineal body, normal urethral meatus.  Vagina moist and well rugated without lesions or discharge.  Cervix pink, without lesions, discharge or tenderness.  No significant cystocele or rectocele.  Bimanual exam shows uterus to be normal size, regular, mobile and nontender.  Adnexa without masses or tenderness.    EXTREMITIES: No edema.      ASSESSMENT AND PLAN  1. Encounter for gynecological examination without abnormal finding  Liquid-Based Pap Smear, Screening   2. Vaginal odor  Vaginosis Screen by DNA Probe    metroNIDAZOLE (FLAGYL) 500 MG tablet   3. Breast cancer screening  Mammo Digital Screening Bilat w/ Robert   4. Encounter for contraceptive management, unspecified type     5. PMS (premenstrual syndrome)  norethindrone (ORTHO MICRONOR) 0.35 mg tablet   6. Fibroid       Vaginitis prevention including :   a. avoiding feminine products such as deoderant soaps, body wash, bubble bath, douches, scented toilet paper, deoderant tampons or pads, baby or feminine wipes, chronic  pad use, etc. and   b. avoiding other vulvovaginal irritants such as long hot baths, humidity, tight, synthetic clothing, chlorine and sitting around in wet bathing suits and   c. wearing cotton underwear, avoiding thong underwear and no underwear to bed and   d. taking showers instead of baths and use a hair dryer on cool setting afterwards to dry and   e.wearing cotton to exercise and shower immediately after exercise and change clothes and   f. using polyurethane condoms without spermicide if sexually active and symptoms are triggered by intercourse;   Diflucan and Mycolog cream use and potential side effects;   -pelvic rest until symptoms resolve.           Patient was counseled today on A.C.S. Pap guidelines and recommendations for yearly pelvic exams, mammograms and monthly self breast exams; to see her PCP for other health maintenance.     Follow up in about 1 year (around 9/11/2021).

## 2020-09-13 LAB
CANDIDA RRNA VAG QL PROBE: NEGATIVE
G VAGINALIS RRNA GENITAL QL PROBE: POSITIVE
T VAGINALIS RRNA GENITAL QL PROBE: NEGATIVE

## 2020-09-27 LAB
FINAL PATHOLOGIC DIAGNOSIS: NORMAL
Lab: NORMAL

## 2020-10-01 ENCOUNTER — OFFICE VISIT (OUTPATIENT)
Dept: INTERNAL MEDICINE | Facility: CLINIC | Age: 40
End: 2020-10-01
Payer: COMMERCIAL

## 2020-10-01 VITALS
HEIGHT: 59 IN | DIASTOLIC BLOOD PRESSURE: 72 MMHG | WEIGHT: 189 LBS | BODY MASS INDEX: 38.1 KG/M2 | SYSTOLIC BLOOD PRESSURE: 112 MMHG

## 2020-10-01 DIAGNOSIS — K21.9 GASTROESOPHAGEAL REFLUX DISEASE WITHOUT ESOPHAGITIS: ICD-10-CM

## 2020-10-01 DIAGNOSIS — I10 ESSENTIAL HYPERTENSION: Primary | ICD-10-CM

## 2020-10-01 DIAGNOSIS — E78.5 HYPERLIPIDEMIA, UNSPECIFIED HYPERLIPIDEMIA TYPE: ICD-10-CM

## 2020-10-01 DIAGNOSIS — Z23 NEED FOR HEPATITIS B VACCINATION: ICD-10-CM

## 2020-10-01 PROCEDURE — 99999 PR PBB SHADOW E&M-EST. PATIENT-LVL III: ICD-10-PCS | Mod: PBBFAC,,, | Performed by: INTERNAL MEDICINE

## 2020-10-01 PROCEDURE — 99214 PR OFFICE/OUTPT VISIT, EST, LEVL IV, 30-39 MIN: ICD-10-PCS | Mod: S$PBB,,, | Performed by: INTERNAL MEDICINE

## 2020-10-01 PROCEDURE — 99999 PR PBB SHADOW E&M-EST. PATIENT-LVL III: CPT | Mod: PBBFAC,,, | Performed by: INTERNAL MEDICINE

## 2020-10-01 PROCEDURE — 99214 OFFICE O/P EST MOD 30 MIN: CPT | Mod: S$PBB,,, | Performed by: INTERNAL MEDICINE

## 2020-10-01 NOTE — PROGRESS NOTES
CHIEF COMPLAINT     Chief Complaint   Patient presents with    Establish Care     was previously established c Dr Area. new patient.     Headache     has been persistent for 4 1/2 weeks. some days, headaches are more severe than others. has tried OTC anti histamines. has hx of pseudo tumor.     Immunizations     in the past, former PCP advised that she recieves a Hep B vaccine. through labs, Hep B lab resulted reactive?       HPI     Chasity Kwan is a 40 y.o. female here today for     Headache  Patient was seen urgent care August 2020 with headache.  Was concerned that she was having recurrence of pseudotumor cerebri.  Patient was seen by Neurology.  She was given prescription for Medrol in muscle relaxer of headache return.  Patient previously had imaging of her head approximately 10 years ago when she was diagnosed with pseudotumor cerebri.  Reports headache is better.    History of gastric sleeve  Was seen by her outside surgeon.  Reports having labs checked in June of 2020.  Patient reports that she takes calcium vitamin-D, multivitamin.    Anxiety  She is followed by psychiatrist.  She takes Lamictal 100 mg sertraline 100 mg and clonazepam 0.5 mg p.r.n..  Feels like symptoms are pretty well controlled.  Reports that she is getting ready to start a new job in that she has just moved back to [place] so mood been a little bit more anxious over the past few months however she is kind of settling into her routine.    Personally Reviewed Patient's Medical, surgical, family and social hx. Changes updated in [facility].  Care Team updated in Epic    Review of Systems:  Review of Systems   Constitutional: Negative for fever and unexpected weight change.   Gastrointestinal:        Reflux   Neurological: Positive for headaches.   Psychiatric/Behavioral: The patient is nervous/anxious.        Health Maintenance:   Reviewed with patient  Due for the following:      PHYSICAL EXAM     /72 (BP  "Location: Left arm, Patient Position: Sitting, BP Method: Large (Manual))   Ht 4' 11" (1.499 m)   Wt 85.7 kg (189 lb)   BMI 38.17 kg/m²     Gen: Well Appearing, NAD  HEENT: PERR, EOMI  Neck: FROM, no thyromegaly, no cervical adenopathy  CVD: RRR, no M/R/G  Pulm: Normal work of breathing, CTAB, no wheezing  Abd:  Soft, NT, ND non TTP, no mass  MSK: no LE edema  Neuro: A&Ox3, gait normal, speech normal  Mood; Mood normal, behavior normal, thought process linear       LABS     Labs reviewed;  Outside CMP reviewed notable for normal kidney function and creatinine 0.83, LFTs were normal  A1c 5.6  B12 998    ASSESSMENT     1. Essential hypertension     2. Hyperlipidemia, unspecified hyperlipidemia type     3. Gastroesophageal reflux disease without esophagitis     4. Need for hepatitis B vaccination  (In Office Administered) Hepatitis B Vaccine (Adult) (IM)           Plan     Chasity Kwan is a 40 y.o. female with hypertension, hyperlipidemia, headache, history of pseudotumor cerebri, status post gastric sleeve, anxiety here today for below issues.  1. Essential hypertension  controlled , Target BP:  Less than 130/80  Meds   Continue:  Continue atenolol 50  RF modification  Work on maintaining healthy body weight, monitor salt intake and treat comorbid medical conditions that may adversely impact BP.      2. Hyperlipidemia, unspecified hyperlipidemia type  Continue atorvastatin 40\    3. Gastroesophageal reflux disease without esophagitis    4. Need for hepatitis B vaccination  - (In Office Administered) Hepatitis B Vaccine (Adult) (IM); Future      Odilon Alas MD      "

## 2020-10-05 ENCOUNTER — PATIENT MESSAGE (OUTPATIENT)
Dept: ADMINISTRATIVE | Facility: HOSPITAL | Age: 40
End: 2020-10-05

## 2020-10-08 ENCOUNTER — OCCUPATIONAL HEALTH (OUTPATIENT)
Dept: URGENT CARE | Facility: CLINIC | Age: 40
End: 2020-10-08
Payer: COMMERCIAL

## 2020-10-08 ENCOUNTER — CLINICAL SUPPORT (OUTPATIENT)
Dept: URGENT CARE | Facility: CLINIC | Age: 40
End: 2020-10-08
Payer: COMMERCIAL

## 2020-10-08 VITALS
OXYGEN SATURATION: 99 % | DIASTOLIC BLOOD PRESSURE: 78 MMHG | BODY MASS INDEX: 38.09 KG/M2 | SYSTOLIC BLOOD PRESSURE: 132 MMHG | HEART RATE: 95 BPM | WEIGHT: 188.94 LBS | HEIGHT: 59 IN | RESPIRATION RATE: 20 BRPM | TEMPERATURE: 98 F

## 2020-10-08 DIAGNOSIS — Z02.1 PHYSICAL EXAM, PRE-EMPLOYMENT: ICD-10-CM

## 2020-10-08 DIAGNOSIS — Z23 FLU VACCINE NEED: Primary | ICD-10-CM

## 2020-10-08 PROCEDURE — 90686 IIV4 VACC NO PRSV 0.5 ML IM: CPT | Mod: S$GLB,,, | Performed by: PHYSICIAN ASSISTANT

## 2020-10-08 PROCEDURE — 99499 UNLISTED E&M SERVICE: CPT | Mod: S$GLB,,, | Performed by: NURSE PRACTITIONER

## 2020-10-08 PROCEDURE — 90471 IMMUNIZATION ADMIN: CPT | Mod: S$GLB,,, | Performed by: PHYSICIAN ASSISTANT

## 2020-10-08 PROCEDURE — 99499 PHYSICAL - BASIC COMPLEXITY: ICD-10-PCS | Mod: S$GLB,,, | Performed by: NURSE PRACTITIONER

## 2020-10-08 PROCEDURE — 90471 FLU VACCINE (QUAD) GREATER THAN OR EQUAL TO 3YO PRESERVATIVE FREE IM: ICD-10-PCS | Mod: S$GLB,,, | Performed by: PHYSICIAN ASSISTANT

## 2020-10-08 PROCEDURE — 90686 FLU VACCINE (QUAD) GREATER THAN OR EQUAL TO 3YO PRESERVATIVE FREE IM: ICD-10-PCS | Mod: S$GLB,,, | Performed by: PHYSICIAN ASSISTANT

## 2020-10-08 NOTE — PROGRESS NOTES
Subjective:       Patient ID: Chasity Kwan is a 40 y.o. female.    Vitals:  vitals were not taken for this visit.     Chief Complaint: Flu Vaccine    Patient here today requesting flu shot.    ROS    Objective:      Physical Exam      Assessment:       No diagnosis found.    Plan:         There are no diagnoses linked to this encounter.

## 2020-10-08 NOTE — PROGRESS NOTES
"Subjective:       Patient ID: Chasity Kwan is a 40 y.o. female.    Vitals:  height is 4' 11" (1.499 m) and weight is 85.7 kg (188 lb 15 oz). Her temperature is 98 °F (36.7 °C). Her blood pressure is 132/78 and her pulse is 95. Her respiration is 20 and oxygen saturation is 99%.     Chief Complaint: No chief complaint on file.    Patient here today for pre-employment physical.      Constitution: Negative for chills, fatigue and fever.   HENT: Negative for congestion and sore throat.    Neck: Negative for painful lymph nodes.   Cardiovascular: Negative for chest pain and leg swelling.   Eyes: Negative for double vision and blurred vision.   Respiratory: Negative for cough and shortness of breath.    Gastrointestinal: Negative for nausea, vomiting and diarrhea.   Genitourinary: Negative for dysuria, frequency, urgency and history of kidney stones.   Musculoskeletal: Negative for joint pain, joint swelling, muscle cramps and muscle ache.   Skin: Negative for color change, pale, rash and bruising.   Allergic/Immunologic: Negative for seasonal allergies.   Neurological: Negative for dizziness, history of vertigo, light-headedness, passing out and headaches.   Hematologic/Lymphatic: Negative for swollen lymph nodes.   Psychiatric/Behavioral: Negative for nervous/anxious, sleep disturbance and depression. The patient is not nervous/anxious.        Objective:      Physical Exam      Assessment:       1. Physical exam, pre-employment        Plan:         Physical exam, pre-employment           "

## 2020-11-03 ENCOUNTER — OCCUPATIONAL HEALTH (OUTPATIENT)
Dept: URGENT CARE | Facility: CLINIC | Age: 40
End: 2020-11-03

## 2020-11-03 DIAGNOSIS — Z02.1 PRE-EMPLOYMENT EXAMINATION: Primary | ICD-10-CM

## 2020-11-03 PROCEDURE — 86787 VARICELLA ZOSTER ANTIBODY, IGG: ICD-10-PCS | Mod: S$GLB,,, | Performed by: PHYSICIAN ASSISTANT

## 2020-11-03 PROCEDURE — 99080 OSHA QUESTIONNAIRE: ICD-10-PCS | Mod: S$GLB,,, | Performed by: PHYSICIAN ASSISTANT

## 2020-11-03 PROCEDURE — 86787 VARICELLA-ZOSTER ANTIBODY: CPT | Mod: S$GLB,,, | Performed by: PHYSICIAN ASSISTANT

## 2020-11-03 PROCEDURE — 99002 MASK FIT-QUALITATIVE: ICD-10-PCS | Mod: S$GLB,,, | Performed by: PHYSICIAN ASSISTANT

## 2020-11-03 PROCEDURE — 80305 DRUG TEST PRSMV DIR OPT OBS: CPT | Mod: S$GLB,,, | Performed by: PHYSICIAN ASSISTANT

## 2020-11-03 PROCEDURE — 86706 HEP B SURFACE ANTIBODY: CPT | Mod: S$GLB,,, | Performed by: PHYSICIAN ASSISTANT

## 2020-11-03 PROCEDURE — 99080 SPECIAL REPORTS OR FORMS: CPT | Mod: S$GLB,,, | Performed by: PHYSICIAN ASSISTANT

## 2020-11-03 PROCEDURE — 92283 PR COLOR VISION EXAMINATION: ICD-10-PCS | Mod: S$GLB,,, | Performed by: PHYSICIAN ASSISTANT

## 2020-11-03 PROCEDURE — 99499 UNLISTED E&M SERVICE: CPT | Mod: S$GLB,,, | Performed by: PHYSICIAN ASSISTANT

## 2020-11-03 PROCEDURE — 86480 QUANTIFERON GOLD TB: ICD-10-PCS | Mod: S$GLB,,, | Performed by: PHYSICIAN ASSISTANT

## 2020-11-03 PROCEDURE — 86480 TB TEST CELL IMMUN MEASURE: CPT | Mod: S$GLB,,, | Performed by: PHYSICIAN ASSISTANT

## 2020-11-03 PROCEDURE — 86799 MEASLES ANTIBODIES (IGG, IGM): CPT | Mod: S$GLB,,, | Performed by: PHYSICIAN ASSISTANT

## 2020-11-03 PROCEDURE — 99002 DEVICE HANDLING PHYS/QHP: CPT | Mod: S$GLB,,, | Performed by: PHYSICIAN ASSISTANT

## 2020-11-03 PROCEDURE — 80305 OOH NON-DOT DRUG SCREEN: ICD-10-PCS | Mod: S$GLB,,, | Performed by: PHYSICIAN ASSISTANT

## 2020-11-03 PROCEDURE — 92283 EXTND COLOR VISION XM: CPT | Mod: S$GLB,,, | Performed by: PHYSICIAN ASSISTANT

## 2020-11-03 PROCEDURE — 86799 MEASLES ANTIBODIES (IGG, IGM): ICD-10-PCS | Mod: S$GLB,,, | Performed by: PHYSICIAN ASSISTANT

## 2020-11-03 PROCEDURE — 99499 PHYSICAL - BASIC COMPLEXITY: ICD-10-PCS | Mod: S$GLB,,, | Performed by: PHYSICIAN ASSISTANT

## 2020-11-03 PROCEDURE — 86706 HEPATITIS B SURFACE ANTIBODY, QUANTITATIVE: ICD-10-PCS | Mod: S$GLB,,, | Performed by: PHYSICIAN ASSISTANT

## 2020-11-06 ENCOUNTER — TELEPHONE (OUTPATIENT)
Dept: URGENT CARE | Facility: CLINIC | Age: 40
End: 2020-11-06

## 2020-12-01 ENCOUNTER — PATIENT MESSAGE (OUTPATIENT)
Dept: INTERNAL MEDICINE | Facility: CLINIC | Age: 40
End: 2020-12-01

## 2020-12-04 ENCOUNTER — OFFICE VISIT (OUTPATIENT)
Dept: INTERNAL MEDICINE | Facility: CLINIC | Age: 40
End: 2020-12-04
Payer: COMMERCIAL

## 2020-12-04 DIAGNOSIS — I10 ESSENTIAL HYPERTENSION: Primary | ICD-10-CM

## 2020-12-04 DIAGNOSIS — F41.9 ANXIETY: ICD-10-CM

## 2020-12-04 PROCEDURE — 99214 OFFICE O/P EST MOD 30 MIN: CPT | Mod: 95,,, | Performed by: INTERNAL MEDICINE

## 2020-12-04 PROCEDURE — 99214 PR OFFICE/OUTPT VISIT, EST, LEVL IV, 30-39 MIN: ICD-10-PCS | Mod: 95,,, | Performed by: INTERNAL MEDICINE

## 2020-12-04 RX ORDER — LOSARTAN POTASSIUM 25 MG/1
25 TABLET ORAL DAILY
Qty: 90 TABLET | Refills: 3 | Status: SHIPPED | OUTPATIENT
Start: 2020-12-04 | End: 2021-10-25 | Stop reason: SDUPTHER

## 2020-12-04 NOTE — PROGRESS NOTES
The patient location is:  [facility]  The chief complaint leading to consultation is:  Hypertension  Visit type: Virtual visit with synchronous audio and video  Total time spent with patient:  15 min  Each patient to whom he or she provides medical services by telemedicine is:  (1) informed of the relationship between the physician and patient and the respective role of any other health care provider with respect to management of the patient; and (2) notified that he or she may decline to receive medical services by telemedicine and may withdraw from such care at any time.      CHIEF COMPLAINT     No chief complaint on file.  TELEMEDICINE VISIT  CC:  Hypertension  HPI     Chasity Kwan is 40 y.o. female here today for   Answers for HPI/ROS submitted by the patient on 12/4/2020   activity change: No  unexpected weight change: Yes  rhinorrhea: No  trouble swallowing: No  visual disturbance: No  chest tightness: No  polyuria: No  difficulty urinating: No  menstrual problem: No  joint swelling: No  arthralgias: No  confusion: No  dysphoric mood: Yes    Hypertension  Patient taking an atenolol to 50 mg daily.  Blood pressures been well controlled.  Patient reports that she has been having worsening anxiety.  Her psychiatrist's wants to start her back on propranolol because it has been helpful in the past.  Patient is wanting to know if we can make other changes to her blood pressure regimen to where we can control her blood pressure and unable her to try propranolol again since it was so helpful in the past.    Patient was previously on lisinopril had to stop due to cough.  She was then on losartan did well but stopped after her blood pressure improved after weight loss surgery.  She has never tried a thiazide diuretic or calcium channel blocker for blood pressure.    Personally Reviewed Patient's Medical, surgical, family and social hx. Changes updated in [facility].  Care Team updated in  Epic    Review of Systems:  Review of Systems   HENT: Negative for hearing loss.    Eyes: Negative for discharge.   Respiratory: Negative for wheezing.    Cardiovascular: Positive for palpitations. Negative for chest pain.   Gastrointestinal: Negative for blood in stool, constipation, diarrhea and vomiting.   Genitourinary: Negative for dysuria and hematuria.   Musculoskeletal: Negative for neck pain.   Neurological: Negative for weakness and headaches.   Endo/Heme/Allergies: Negative for polydipsia.       Health Maintenance:   Reviewed with patient  Due for the following:      PHYSICAL EXAM       Gen: Well Appearing, NAD  HEENT: PERR, EOMI  Chest: Normal work of breathing,   Neuro: A&Ox3,  speech normal  Mood; Mood normal, behavior normal, thought process linear       LABS     Labs reviewed;  Lab Results   Component Value Date    CREATININE 0.7 07/01/2015    BUN 16 07/01/2015     07/01/2015    K 3.9 07/01/2015     07/01/2015    CO2 28 07/01/2015         ASSESSMENT     1. Essential hypertension  losartan (COZAAR) 25 MG tablet    Basic Metabolic Panel   2. Anxiety               Plan     Chasity Kwan is a 40 y.o. female  1. Essential hypertension  Discussed alternative medications with patient.  Most comfortable restarting losartan since she tolerated in the past.  Encouraged her to take her atenolol 25 mg daily x3 days then stopping  Start losartan 25 mg daily today  Will check blood pressure twice a week  Will send me a [employer] message in 1 month with readings to see if we need to up titrate her losartan.  Will get BMP in 2 weeks to check on potassium and creatinine.  - losartan (COZAAR) 25 MG tablet; Take 1 tablet (25 mg total) by mouth once daily.  Dispense: 90 tablet; Refill: 3  - Basic Metabolic Panel; Future    2. Anxiety  Wait until she has stop the atenolol before starting propranolol as per her psychiatrist's recommendation..      Odilon Alas MD

## 2021-01-04 ENCOUNTER — PATIENT MESSAGE (OUTPATIENT)
Dept: ADMINISTRATIVE | Facility: HOSPITAL | Age: 41
End: 2021-01-04

## 2021-01-15 ENCOUNTER — IMMUNIZATION (OUTPATIENT)
Dept: PHARMACY | Facility: CLINIC | Age: 41
End: 2021-01-15
Payer: COMMERCIAL

## 2021-01-15 DIAGNOSIS — Z23 NEED FOR VACCINATION: Primary | ICD-10-CM

## 2021-02-12 ENCOUNTER — IMMUNIZATION (OUTPATIENT)
Dept: PHARMACY | Facility: CLINIC | Age: 41
End: 2021-02-12
Payer: COMMERCIAL

## 2021-02-12 DIAGNOSIS — Z23 NEED FOR VACCINATION: Primary | ICD-10-CM

## 2021-03-15 ENCOUNTER — PATIENT MESSAGE (OUTPATIENT)
Dept: OBSTETRICS AND GYNECOLOGY | Facility: CLINIC | Age: 41
End: 2021-03-15

## 2021-03-15 DIAGNOSIS — B00.9 HERPES: ICD-10-CM

## 2021-03-15 RX ORDER — VALACYCLOVIR HYDROCHLORIDE 500 MG/1
500 TABLET, FILM COATED ORAL DAILY PRN
Qty: 60 TABLET | Refills: 3 | Status: SHIPPED | OUTPATIENT
Start: 2021-03-15 | End: 2021-11-11 | Stop reason: SDUPTHER

## 2021-03-16 ENCOUNTER — PATIENT MESSAGE (OUTPATIENT)
Dept: OBSTETRICS AND GYNECOLOGY | Facility: CLINIC | Age: 41
End: 2021-03-16

## 2021-03-18 ENCOUNTER — PATIENT MESSAGE (OUTPATIENT)
Dept: RESEARCH | Facility: HOSPITAL | Age: 41
End: 2021-03-18

## 2021-03-26 ENCOUNTER — HOSPITAL ENCOUNTER (OUTPATIENT)
Dept: RADIOLOGY | Facility: HOSPITAL | Age: 41
Discharge: HOME | End: 2021-03-26
Attending: PHYSICIAN ASSISTANT
Payer: COMMERCIAL

## 2021-03-26 ENCOUNTER — PATIENT MESSAGE (OUTPATIENT)
Dept: RESEARCH | Facility: HOSPITAL | Age: 41
End: 2021-03-26

## 2021-03-26 ENCOUNTER — OFFICE VISIT (OUTPATIENT)
Dept: ORTHOPEDICS | Facility: CLINIC | Age: 41
End: 2021-03-26
Payer: COMMERCIAL

## 2021-03-26 VITALS — WEIGHT: 184.19 LBS | BODY MASS INDEX: 37.13 KG/M2 | HEIGHT: 59 IN

## 2021-03-26 DIAGNOSIS — M25.511 ACUTE PAIN OF RIGHT SHOULDER: ICD-10-CM

## 2021-03-26 DIAGNOSIS — M25.511 ACUTE PAIN OF RIGHT SHOULDER: Primary | ICD-10-CM

## 2021-03-26 DIAGNOSIS — M75.51 SUBACROMIAL BURSITIS OF RIGHT SHOULDER JOINT: ICD-10-CM

## 2021-03-26 PROCEDURE — 99203 OFFICE O/P NEW LOW 30 MIN: CPT | Mod: 25,S$GLB,, | Performed by: PHYSICIAN ASSISTANT

## 2021-03-26 PROCEDURE — 99999 PR PBB SHADOW E&M-EST. PATIENT-LVL III: ICD-10-PCS | Mod: PBBFAC,,, | Performed by: PHYSICIAN ASSISTANT

## 2021-03-26 PROCEDURE — 20610 LARGE JOINT ASPIRATION/INJECTION: R SUBACROMIAL BURSA: ICD-10-PCS | Mod: RT,S$GLB,, | Performed by: PHYSICIAN ASSISTANT

## 2021-03-26 PROCEDURE — 20610 DRAIN/INJ JOINT/BURSA W/O US: CPT | Mod: RT,S$GLB,, | Performed by: PHYSICIAN ASSISTANT

## 2021-03-26 PROCEDURE — 1125F AMNT PAIN NOTED PAIN PRSNT: CPT | Mod: S$GLB,,, | Performed by: PHYSICIAN ASSISTANT

## 2021-03-26 PROCEDURE — 3008F BODY MASS INDEX DOCD: CPT | Mod: CPTII,S$GLB,, | Performed by: PHYSICIAN ASSISTANT

## 2021-03-26 PROCEDURE — 73030 XR SHOULDER TRAUMA 3 VIEW RIGHT: ICD-10-PCS | Mod: 26,RT,, | Performed by: RADIOLOGY

## 2021-03-26 PROCEDURE — 99203 PR OFFICE/OUTPT VISIT, NEW, LEVL III, 30-44 MIN: ICD-10-PCS | Mod: 25,S$GLB,, | Performed by: PHYSICIAN ASSISTANT

## 2021-03-26 PROCEDURE — 1125F PR PAIN SEVERITY QUANTIFIED, PAIN PRESENT: ICD-10-PCS | Mod: S$GLB,,, | Performed by: PHYSICIAN ASSISTANT

## 2021-03-26 PROCEDURE — 3008F PR BODY MASS INDEX (BMI) DOCUMENTED: ICD-10-PCS | Mod: CPTII,S$GLB,, | Performed by: PHYSICIAN ASSISTANT

## 2021-03-26 PROCEDURE — 99999 PR PBB SHADOW E&M-EST. PATIENT-LVL III: CPT | Mod: PBBFAC,,, | Performed by: PHYSICIAN ASSISTANT

## 2021-03-26 PROCEDURE — 73030 X-RAY EXAM OF SHOULDER: CPT | Mod: TC,RT

## 2021-03-26 PROCEDURE — 73030 X-RAY EXAM OF SHOULDER: CPT | Mod: 26,RT,, | Performed by: RADIOLOGY

## 2021-03-26 RX ADMIN — TRIAMCINOLONE ACETONIDE 40 MG: 40 INJECTION, SUSPENSION INTRA-ARTICULAR; INTRAMUSCULAR at 04:03

## 2021-03-29 RX ORDER — TRIAMCINOLONE ACETONIDE 40 MG/ML
40 INJECTION, SUSPENSION INTRA-ARTICULAR; INTRAMUSCULAR
Status: DISCONTINUED | OUTPATIENT
Start: 2021-03-26 | End: 2021-03-29 | Stop reason: HOSPADM

## 2021-04-05 ENCOUNTER — PATIENT MESSAGE (OUTPATIENT)
Dept: ADMINISTRATIVE | Facility: HOSPITAL | Age: 41
End: 2021-04-05

## 2021-04-14 ENCOUNTER — PATIENT MESSAGE (OUTPATIENT)
Dept: OBSTETRICS AND GYNECOLOGY | Facility: CLINIC | Age: 41
End: 2021-04-14

## 2021-04-15 ENCOUNTER — TELEPHONE (OUTPATIENT)
Dept: OBSTETRICS AND GYNECOLOGY | Facility: CLINIC | Age: 41
End: 2021-04-15

## 2021-04-15 DIAGNOSIS — Z30.9 ENCOUNTER FOR CONTRACEPTIVE MANAGEMENT, UNSPECIFIED TYPE: Primary | ICD-10-CM

## 2021-04-15 RX ORDER — DROSPIRENONE AND ETHINYL ESTRADIOL 0.02-3(28)
1 KIT ORAL DAILY
Qty: 30 TABLET | Refills: 11 | Status: SHIPPED | OUTPATIENT
Start: 2021-04-15 | End: 2021-11-11 | Stop reason: SDUPTHER

## 2021-04-21 ENCOUNTER — PATIENT MESSAGE (OUTPATIENT)
Dept: ORTHOPEDICS | Facility: CLINIC | Age: 41
End: 2021-04-21

## 2021-05-11 ENCOUNTER — PATIENT MESSAGE (OUTPATIENT)
Dept: ORTHOPEDICS | Facility: CLINIC | Age: 41
End: 2021-05-11

## 2021-05-12 DIAGNOSIS — G89.29 CHRONIC RIGHT SHOULDER PAIN: ICD-10-CM

## 2021-05-12 DIAGNOSIS — M25.511 CHRONIC RIGHT SHOULDER PAIN: ICD-10-CM

## 2021-06-07 ENCOUNTER — PATIENT MESSAGE (OUTPATIENT)
Dept: OBSTETRICS AND GYNECOLOGY | Facility: CLINIC | Age: 41
End: 2021-06-07

## 2021-07-06 ENCOUNTER — PATIENT MESSAGE (OUTPATIENT)
Dept: ADMINISTRATIVE | Facility: HOSPITAL | Age: 41
End: 2021-07-06

## 2021-07-21 ENCOUNTER — PATIENT MESSAGE (OUTPATIENT)
Dept: ORTHOPEDICS | Facility: CLINIC | Age: 41
End: 2021-07-21

## 2021-08-19 ENCOUNTER — HOSPITAL ENCOUNTER (OUTPATIENT)
Dept: RADIOLOGY | Facility: OTHER | Age: 41
Discharge: HOME | End: 2021-08-19
Attending: PHYSICIAN ASSISTANT
Payer: COMMERCIAL

## 2021-08-19 ENCOUNTER — HOSPITAL ENCOUNTER (OUTPATIENT)
Dept: RADIOLOGY | Facility: OTHER | Age: 41
Discharge: HOME | End: 2021-08-19
Attending: OBSTETRICS & GYNECOLOGY
Payer: COMMERCIAL

## 2021-08-19 DIAGNOSIS — M25.511 CHRONIC RIGHT SHOULDER PAIN: ICD-10-CM

## 2021-08-19 DIAGNOSIS — Z12.31 VISIT FOR SCREENING MAMMOGRAM: ICD-10-CM

## 2021-08-19 DIAGNOSIS — G89.29 CHRONIC RIGHT SHOULDER PAIN: ICD-10-CM

## 2021-08-19 DIAGNOSIS — Z12.39 BREAST CANCER SCREENING: ICD-10-CM

## 2021-08-19 PROCEDURE — 77063 MAMMO DIGITAL SCREENING BILAT WITH TOMO: ICD-10-PCS | Mod: 26,,, | Performed by: RADIOLOGY

## 2021-08-19 PROCEDURE — 77067 MAMMO DIGITAL SCREENING BILAT WITH TOMO: ICD-10-PCS | Mod: 26,,, | Performed by: RADIOLOGY

## 2021-08-19 PROCEDURE — 73221 MRI JOINT UPR EXTREM W/O DYE: CPT | Mod: 26,RT,, | Performed by: RADIOLOGY

## 2021-08-19 PROCEDURE — 73221 MRI JOINT UPR EXTREM W/O DYE: CPT | Mod: TC,RT

## 2021-08-19 PROCEDURE — 77063 BREAST TOMOSYNTHESIS BI: CPT | Mod: 26,,, | Performed by: RADIOLOGY

## 2021-08-19 PROCEDURE — 73221 MRI SHOULDER WITHOUT CONTRAST RIGHT: ICD-10-PCS | Mod: 26,RT,, | Performed by: RADIOLOGY

## 2021-08-19 PROCEDURE — 77067 SCR MAMMO BI INCL CAD: CPT | Mod: 26,,, | Performed by: RADIOLOGY

## 2021-08-19 PROCEDURE — 77067 SCR MAMMO BI INCL CAD: CPT | Mod: TC

## 2021-08-20 ENCOUNTER — PATIENT MESSAGE (OUTPATIENT)
Dept: ORTHOPEDICS | Facility: CLINIC | Age: 41
End: 2021-08-20

## 2021-10-19 ENCOUNTER — PATIENT MESSAGE (OUTPATIENT)
Dept: INTERNAL MEDICINE | Facility: CLINIC | Age: 41
End: 2021-10-19

## 2021-10-19 DIAGNOSIS — Z90.3 HISTORY OF LAPAROSCOPIC PARTIAL GASTRECTOMY: Primary | ICD-10-CM

## 2021-10-21 ENCOUNTER — PATIENT MESSAGE (OUTPATIENT)
Dept: INTERNAL MEDICINE | Facility: CLINIC | Age: 41
End: 2021-10-21
Payer: COMMERCIAL

## 2021-10-22 ENCOUNTER — LAB VISIT (OUTPATIENT)
Dept: LAB | Facility: OTHER | Age: 41
End: 2021-10-22
Attending: INTERNAL MEDICINE
Payer: COMMERCIAL

## 2021-10-22 DIAGNOSIS — Z90.3 HISTORY OF LAPAROSCOPIC PARTIAL GASTRECTOMY: ICD-10-CM

## 2021-10-22 LAB
25(OH)D3+25(OH)D2 SERPL-MCNC: 51 NG/ML (ref 30–96)
ALBUMIN SERPL BCP-MCNC: 3.4 G/DL (ref 3.5–5.2)
ALP SERPL-CCNC: 60 U/L (ref 55–135)
ALT SERPL W/O P-5'-P-CCNC: 11 U/L (ref 10–44)
ANION GAP SERPL CALC-SCNC: 12 MMOL/L (ref 8–16)
AST SERPL-CCNC: 16 U/L (ref 10–40)
BASOPHILS # BLD AUTO: 0.04 K/UL (ref 0–0.2)
BASOPHILS NFR BLD: 0.6 % (ref 0–1.9)
BILIRUB SERPL-MCNC: 0.4 MG/DL (ref 0.1–1)
BUN SERPL-MCNC: 13 MG/DL (ref 6–20)
CALCIUM SERPL-MCNC: 9.5 MG/DL (ref 8.7–10.5)
CHLORIDE SERPL-SCNC: 106 MMOL/L (ref 95–110)
CO2 SERPL-SCNC: 22 MMOL/L (ref 23–29)
CREAT SERPL-MCNC: 0.8 MG/DL (ref 0.5–1.4)
DIFFERENTIAL METHOD: NORMAL
EOSINOPHIL # BLD AUTO: 0.1 K/UL (ref 0–0.5)
EOSINOPHIL NFR BLD: 0.8 % (ref 0–8)
ERYTHROCYTE [DISTWIDTH] IN BLOOD BY AUTOMATED COUNT: 13.1 % (ref 11.5–14.5)
EST. GFR  (AFRICAN AMERICAN): >60 ML/MIN/1.73 M^2
EST. GFR  (NON AFRICAN AMERICAN): >60 ML/MIN/1.73 M^2
FERRITIN SERPL-MCNC: 61 NG/ML (ref 20–300)
GLUCOSE SERPL-MCNC: 91 MG/DL (ref 70–110)
HCT VFR BLD AUTO: 37.6 % (ref 37–48.5)
HGB BLD-MCNC: 12.5 G/DL (ref 12–16)
IMM GRANULOCYTES # BLD AUTO: 0.02 K/UL (ref 0–0.04)
IMM GRANULOCYTES NFR BLD AUTO: 0.3 % (ref 0–0.5)
IRON SERPL-MCNC: 92 UG/DL (ref 30–160)
LYMPHOCYTES # BLD AUTO: 1.6 K/UL (ref 1–4.8)
LYMPHOCYTES NFR BLD: 22.7 % (ref 18–48)
MCH RBC QN AUTO: 29.4 PG (ref 27–31)
MCHC RBC AUTO-ENTMCNC: 33.2 G/DL (ref 32–36)
MCV RBC AUTO: 89 FL (ref 82–98)
MONOCYTES # BLD AUTO: 0.7 K/UL (ref 0.3–1)
MONOCYTES NFR BLD: 9.6 % (ref 4–15)
NEUTROPHILS # BLD AUTO: 4.8 K/UL (ref 1.8–7.7)
NEUTROPHILS NFR BLD: 66 % (ref 38–73)
NRBC BLD-RTO: 0 /100 WBC
PLATELET # BLD AUTO: 241 K/UL (ref 150–450)
PMV BLD AUTO: 12.9 FL (ref 9.2–12.9)
POTASSIUM SERPL-SCNC: 4.1 MMOL/L (ref 3.5–5.1)
PREALB SERPL-MCNC: 31 MG/DL (ref 20–43)
PROT SERPL-MCNC: 7.6 G/DL (ref 6–8.4)
PTH-INTACT SERPL-MCNC: 37.9 PG/ML (ref 9–77)
RBC # BLD AUTO: 4.25 M/UL (ref 4–5.4)
SATURATED IRON: 21 % (ref 20–50)
SODIUM SERPL-SCNC: 140 MMOL/L (ref 136–145)
TOTAL IRON BINDING CAPACITY: 448 UG/DL (ref 250–450)
TRANSFERRIN SERPL-MCNC: 303 MG/DL (ref 200–375)
VIT B12 SERPL-MCNC: 670 PG/ML (ref 210–950)
WBC # BLD AUTO: 7.21 K/UL (ref 3.9–12.7)

## 2021-10-22 PROCEDURE — 36415 COLL VENOUS BLD VENIPUNCTURE: CPT | Performed by: INTERNAL MEDICINE

## 2021-10-22 PROCEDURE — 82728 ASSAY OF FERRITIN: CPT | Performed by: INTERNAL MEDICINE

## 2021-10-22 PROCEDURE — 82306 VITAMIN D 25 HYDROXY: CPT | Performed by: INTERNAL MEDICINE

## 2021-10-22 PROCEDURE — 84134 ASSAY OF PREALBUMIN: CPT | Performed by: INTERNAL MEDICINE

## 2021-10-22 PROCEDURE — 85025 COMPLETE CBC W/AUTO DIFF WBC: CPT | Performed by: INTERNAL MEDICINE

## 2021-10-22 PROCEDURE — 82607 VITAMIN B-12: CPT | Performed by: INTERNAL MEDICINE

## 2021-10-22 PROCEDURE — 80053 COMPREHEN METABOLIC PANEL: CPT | Performed by: INTERNAL MEDICINE

## 2021-10-22 PROCEDURE — 84466 ASSAY OF TRANSFERRIN: CPT | Performed by: INTERNAL MEDICINE

## 2021-10-22 PROCEDURE — 84425 ASSAY OF VITAMIN B-1: CPT | Performed by: INTERNAL MEDICINE

## 2021-10-22 PROCEDURE — 82747 ASSAY OF FOLIC ACID RBC: CPT | Performed by: INTERNAL MEDICINE

## 2021-10-22 PROCEDURE — 83970 ASSAY OF PARATHORMONE: CPT | Performed by: INTERNAL MEDICINE

## 2021-10-25 DIAGNOSIS — I10 ESSENTIAL HYPERTENSION: ICD-10-CM

## 2021-10-25 LAB — FOLATE RBC-MCNC: 669 NG/ML (ref 280–791)

## 2021-10-25 RX ORDER — LOSARTAN POTASSIUM 25 MG/1
25 TABLET ORAL DAILY
Qty: 90 TABLET | Refills: 3 | Status: SHIPPED | OUTPATIENT
Start: 2021-10-25 | End: 2021-11-30 | Stop reason: SDUPTHER

## 2021-10-26 LAB — VIT B1 BLD-MCNC: 59 UG/L (ref 38–122)

## 2021-10-28 ENCOUNTER — PATIENT MESSAGE (OUTPATIENT)
Dept: INTERNAL MEDICINE | Facility: CLINIC | Age: 41
End: 2021-10-28
Payer: COMMERCIAL

## 2021-11-03 RX ORDER — ATORVASTATIN CALCIUM 40 MG/1
40 TABLET, FILM COATED ORAL DAILY
Qty: 90 TABLET | Refills: 0 | Status: SHIPPED | OUTPATIENT
Start: 2021-11-03 | End: 2021-11-11

## 2021-11-08 ENCOUNTER — TELEPHONE (OUTPATIENT)
Dept: INTERNAL MEDICINE | Facility: CLINIC | Age: 41
End: 2021-11-08
Payer: COMMERCIAL

## 2021-11-11 ENCOUNTER — OFFICE VISIT (OUTPATIENT)
Dept: OBSTETRICS AND GYNECOLOGY | Facility: CLINIC | Age: 41
End: 2021-11-11
Payer: COMMERCIAL

## 2021-11-11 VITALS
DIASTOLIC BLOOD PRESSURE: 86 MMHG | WEIGHT: 180.25 LBS | HEIGHT: 60 IN | BODY MASS INDEX: 35.39 KG/M2 | SYSTOLIC BLOOD PRESSURE: 116 MMHG

## 2021-11-11 DIAGNOSIS — Z01.419 ENCOUNTER FOR GYNECOLOGICAL EXAMINATION WITHOUT ABNORMAL FINDING: Primary | ICD-10-CM

## 2021-11-11 DIAGNOSIS — B37.9 CANDIDA INFECTION: ICD-10-CM

## 2021-11-11 DIAGNOSIS — B00.9 HERPES: ICD-10-CM

## 2021-11-11 DIAGNOSIS — Z30.9 ENCOUNTER FOR CONTRACEPTIVE MANAGEMENT, UNSPECIFIED TYPE: ICD-10-CM

## 2021-11-11 PROCEDURE — 3079F DIAST BP 80-89 MM HG: CPT | Mod: CPTII,S$GLB,, | Performed by: OBSTETRICS & GYNECOLOGY

## 2021-11-11 PROCEDURE — 99999 PR PBB SHADOW E&M-EST. PATIENT-LVL III: ICD-10-PCS | Mod: PBBFAC,,, | Performed by: OBSTETRICS & GYNECOLOGY

## 2021-11-11 PROCEDURE — 99396 PREV VISIT EST AGE 40-64: CPT | Mod: S$GLB,,, | Performed by: OBSTETRICS & GYNECOLOGY

## 2021-11-11 PROCEDURE — 99999 PR PBB SHADOW E&M-EST. PATIENT-LVL III: CPT | Mod: PBBFAC,,, | Performed by: OBSTETRICS & GYNECOLOGY

## 2021-11-11 PROCEDURE — 1159F MED LIST DOCD IN RCRD: CPT | Mod: CPTII,S$GLB,, | Performed by: OBSTETRICS & GYNECOLOGY

## 2021-11-11 PROCEDURE — 4010F PR ACE/ARB THEARPY RXD/TAKEN: ICD-10-PCS | Mod: CPTII,S$GLB,, | Performed by: OBSTETRICS & GYNECOLOGY

## 2021-11-11 PROCEDURE — 1159F PR MEDICATION LIST DOCUMENTED IN MEDICAL RECORD: ICD-10-PCS | Mod: CPTII,S$GLB,, | Performed by: OBSTETRICS & GYNECOLOGY

## 2021-11-11 PROCEDURE — 3008F BODY MASS INDEX DOCD: CPT | Mod: CPTII,S$GLB,, | Performed by: OBSTETRICS & GYNECOLOGY

## 2021-11-11 PROCEDURE — 1160F RVW MEDS BY RX/DR IN RCRD: CPT | Mod: CPTII,S$GLB,, | Performed by: OBSTETRICS & GYNECOLOGY

## 2021-11-11 PROCEDURE — 4010F ACE/ARB THERAPY RXD/TAKEN: CPT | Mod: CPTII,S$GLB,, | Performed by: OBSTETRICS & GYNECOLOGY

## 2021-11-11 PROCEDURE — 3074F PR MOST RECENT SYSTOLIC BLOOD PRESSURE < 130 MM HG: ICD-10-PCS | Mod: CPTII,S$GLB,, | Performed by: OBSTETRICS & GYNECOLOGY

## 2021-11-11 PROCEDURE — 1160F PR REVIEW ALL MEDS BY PRESCRIBER/CLIN PHARMACIST DOCUMENTED: ICD-10-PCS | Mod: CPTII,S$GLB,, | Performed by: OBSTETRICS & GYNECOLOGY

## 2021-11-11 PROCEDURE — 3079F PR MOST RECENT DIASTOLIC BLOOD PRESSURE 80-89 MM HG: ICD-10-PCS | Mod: CPTII,S$GLB,, | Performed by: OBSTETRICS & GYNECOLOGY

## 2021-11-11 PROCEDURE — 3074F SYST BP LT 130 MM HG: CPT | Mod: CPTII,S$GLB,, | Performed by: OBSTETRICS & GYNECOLOGY

## 2021-11-11 PROCEDURE — 3008F PR BODY MASS INDEX (BMI) DOCUMENTED: ICD-10-PCS | Mod: CPTII,S$GLB,, | Performed by: OBSTETRICS & GYNECOLOGY

## 2021-11-11 PROCEDURE — 99396 PR PREVENTIVE VISIT,EST,40-64: ICD-10-PCS | Mod: S$GLB,,, | Performed by: OBSTETRICS & GYNECOLOGY

## 2021-11-11 RX ORDER — VALACYCLOVIR HYDROCHLORIDE 500 MG/1
500 TABLET, FILM COATED ORAL DAILY PRN
Qty: 60 TABLET | Refills: 3 | Status: SHIPPED | OUTPATIENT
Start: 2021-11-11 | End: 2022-12-28

## 2021-11-11 RX ORDER — PROPRANOLOL HYDROCHLORIDE 10 MG/1
TABLET ORAL
COMMUNITY
Start: 2021-10-13

## 2021-11-11 RX ORDER — CLOTRIMAZOLE AND BETAMETHASONE DIPROPIONATE 10; .64 MG/G; MG/G
CREAM TOPICAL
Qty: 45 G | Refills: 3 | Status: SHIPPED | OUTPATIENT
Start: 2021-11-11 | End: 2022-07-22

## 2021-11-11 RX ORDER — DROSPIRENONE AND ETHINYL ESTRADIOL 0.02-3(28)
1 KIT ORAL DAILY
Qty: 30 TABLET | Refills: 11 | Status: SHIPPED | OUTPATIENT
Start: 2021-11-11 | End: 2022-11-30

## 2021-11-11 RX ORDER — ESCITALOPRAM OXALATE 20 MG/1
TABLET ORAL
COMMUNITY
Start: 2021-10-15

## 2021-11-11 RX ORDER — ROSUVASTATIN CALCIUM 10 MG/1
1 TABLET, COATED ORAL DAILY
COMMUNITY
Start: 2021-03-11 | End: 2021-11-18 | Stop reason: SDUPTHER

## 2021-11-13 ENCOUNTER — OFFICE VISIT (OUTPATIENT)
Dept: URGENT CARE | Facility: CLINIC | Age: 41
End: 2021-11-13
Payer: COMMERCIAL

## 2021-11-13 VITALS
SYSTOLIC BLOOD PRESSURE: 117 MMHG | HEIGHT: 60 IN | TEMPERATURE: 99 F | OXYGEN SATURATION: 96 % | RESPIRATION RATE: 16 BRPM | DIASTOLIC BLOOD PRESSURE: 72 MMHG | HEART RATE: 72 BPM | WEIGHT: 180 LBS | BODY MASS INDEX: 35.34 KG/M2

## 2021-11-13 DIAGNOSIS — J06.9 VIRAL URI: Primary | ICD-10-CM

## 2021-11-13 DIAGNOSIS — R05.9 COUGH: ICD-10-CM

## 2021-11-13 DIAGNOSIS — R11.10 VOMITING, INTRACTABILITY OF VOMITING NOT SPECIFIED, PRESENCE OF NAUSEA NOT SPECIFIED, UNSPECIFIED VOMITING TYPE: ICD-10-CM

## 2021-11-13 LAB
CTP QC/QA: YES
CTP QC/QA: YES
POC MOLECULAR INFLUENZA A AGN: NEGATIVE
POC MOLECULAR INFLUENZA B AGN: NEGATIVE
SARS-COV-2 RDRP RESP QL NAA+PROBE: NEGATIVE

## 2021-11-13 PROCEDURE — 3074F SYST BP LT 130 MM HG: CPT | Mod: CPTII,S$GLB,, | Performed by: NURSE PRACTITIONER

## 2021-11-13 PROCEDURE — 3074F PR MOST RECENT SYSTOLIC BLOOD PRESSURE < 130 MM HG: ICD-10-PCS | Mod: CPTII,S$GLB,, | Performed by: NURSE PRACTITIONER

## 2021-11-13 PROCEDURE — 99213 PR OFFICE/OUTPT VISIT, EST, LEVL III, 20-29 MIN: ICD-10-PCS | Mod: S$GLB,,, | Performed by: NURSE PRACTITIONER

## 2021-11-13 PROCEDURE — U0002 COVID-19 LAB TEST NON-CDC: HCPCS | Mod: QW,S$GLB,, | Performed by: NURSE PRACTITIONER

## 2021-11-13 PROCEDURE — 4010F PR ACE/ARB THEARPY RXD/TAKEN: ICD-10-PCS | Mod: CPTII,S$GLB,, | Performed by: NURSE PRACTITIONER

## 2021-11-13 PROCEDURE — 3008F PR BODY MASS INDEX (BMI) DOCUMENTED: ICD-10-PCS | Mod: CPTII,S$GLB,, | Performed by: NURSE PRACTITIONER

## 2021-11-13 PROCEDURE — 87502 POCT INFLUENZA A/B MOLECULAR: ICD-10-PCS | Mod: QW,S$GLB,, | Performed by: NURSE PRACTITIONER

## 2021-11-13 PROCEDURE — 1159F MED LIST DOCD IN RCRD: CPT | Mod: CPTII,S$GLB,, | Performed by: NURSE PRACTITIONER

## 2021-11-13 PROCEDURE — 3008F BODY MASS INDEX DOCD: CPT | Mod: CPTII,S$GLB,, | Performed by: NURSE PRACTITIONER

## 2021-11-13 PROCEDURE — 1159F PR MEDICATION LIST DOCUMENTED IN MEDICAL RECORD: ICD-10-PCS | Mod: CPTII,S$GLB,, | Performed by: NURSE PRACTITIONER

## 2021-11-13 PROCEDURE — U0002: ICD-10-PCS | Mod: QW,S$GLB,, | Performed by: NURSE PRACTITIONER

## 2021-11-13 PROCEDURE — 87502 INFLUENZA DNA AMP PROBE: CPT | Mod: QW,S$GLB,, | Performed by: NURSE PRACTITIONER

## 2021-11-13 PROCEDURE — 4010F ACE/ARB THERAPY RXD/TAKEN: CPT | Mod: CPTII,S$GLB,, | Performed by: NURSE PRACTITIONER

## 2021-11-13 PROCEDURE — 1160F RVW MEDS BY RX/DR IN RCRD: CPT | Mod: CPTII,S$GLB,, | Performed by: NURSE PRACTITIONER

## 2021-11-13 PROCEDURE — 3078F PR MOST RECENT DIASTOLIC BLOOD PRESSURE < 80 MM HG: ICD-10-PCS | Mod: CPTII,S$GLB,, | Performed by: NURSE PRACTITIONER

## 2021-11-13 PROCEDURE — 1160F PR REVIEW ALL MEDS BY PRESCRIBER/CLIN PHARMACIST DOCUMENTED: ICD-10-PCS | Mod: CPTII,S$GLB,, | Performed by: NURSE PRACTITIONER

## 2021-11-13 PROCEDURE — 99213 OFFICE O/P EST LOW 20 MIN: CPT | Mod: S$GLB,,, | Performed by: NURSE PRACTITIONER

## 2021-11-13 PROCEDURE — 3078F DIAST BP <80 MM HG: CPT | Mod: CPTII,S$GLB,, | Performed by: NURSE PRACTITIONER

## 2021-11-13 RX ORDER — BENZONATATE 200 MG/1
200 CAPSULE ORAL 3 TIMES DAILY PRN
Qty: 30 CAPSULE | Refills: 0 | Status: SHIPPED | OUTPATIENT
Start: 2021-11-13 | End: 2021-11-23

## 2021-11-13 RX ORDER — ONDANSETRON 4 MG/1
TABLET, ORALLY DISINTEGRATING ORAL
Qty: 10 TABLET | Refills: 0 | Status: SHIPPED | OUTPATIENT
Start: 2021-11-13 | End: 2021-11-30

## 2021-11-13 RX ORDER — CODEINE PHOSPHATE AND GUAIFENESIN 10; 100 MG/5ML; MG/5ML
10 SOLUTION ORAL EVERY 6 HOURS PRN
Qty: 120 ML | Refills: 0 | Status: SHIPPED | OUTPATIENT
Start: 2021-11-13 | End: 2021-11-23

## 2021-11-17 ENCOUNTER — PATIENT MESSAGE (OUTPATIENT)
Dept: INTERNAL MEDICINE | Facility: CLINIC | Age: 41
End: 2021-11-17
Payer: COMMERCIAL

## 2021-11-18 DIAGNOSIS — E78.5 HYPERLIPIDEMIA, UNSPECIFIED HYPERLIPIDEMIA TYPE: Primary | ICD-10-CM

## 2021-11-18 RX ORDER — ROSUVASTATIN CALCIUM 10 MG/1
10 TABLET, COATED ORAL DAILY
Qty: 90 TABLET | Refills: 3 | Status: SHIPPED | OUTPATIENT
Start: 2021-11-18 | End: 2021-11-30 | Stop reason: SDUPTHER

## 2021-11-30 ENCOUNTER — OFFICE VISIT (OUTPATIENT)
Dept: INTERNAL MEDICINE | Facility: CLINIC | Age: 41
End: 2021-11-30
Payer: COMMERCIAL

## 2021-11-30 ENCOUNTER — LAB VISIT (OUTPATIENT)
Dept: LAB | Facility: HOSPITAL | Age: 41
End: 2021-11-30
Attending: INTERNAL MEDICINE
Payer: COMMERCIAL

## 2021-11-30 VITALS
SYSTOLIC BLOOD PRESSURE: 112 MMHG | HEIGHT: 59 IN | BODY MASS INDEX: 40.85 KG/M2 | OXYGEN SATURATION: 98 % | DIASTOLIC BLOOD PRESSURE: 76 MMHG | HEART RATE: 67 BPM | WEIGHT: 202.63 LBS

## 2021-11-30 DIAGNOSIS — Z00.00 ANNUAL PHYSICAL EXAM: ICD-10-CM

## 2021-11-30 DIAGNOSIS — F41.9 ANXIETY: ICD-10-CM

## 2021-11-30 DIAGNOSIS — I10 ESSENTIAL HYPERTENSION: ICD-10-CM

## 2021-11-30 DIAGNOSIS — G47.8 NON-RESTORATIVE SLEEP: ICD-10-CM

## 2021-11-30 DIAGNOSIS — Z90.3 HISTORY OF LAPAROSCOPIC PARTIAL GASTRECTOMY: ICD-10-CM

## 2021-11-30 DIAGNOSIS — Z00.00 ANNUAL PHYSICAL EXAM: Primary | ICD-10-CM

## 2021-11-30 DIAGNOSIS — E78.5 HYPERLIPIDEMIA, UNSPECIFIED HYPERLIPIDEMIA TYPE: ICD-10-CM

## 2021-11-30 LAB
ANION GAP SERPL CALC-SCNC: 9 MMOL/L (ref 8–16)
BUN SERPL-MCNC: 15 MG/DL (ref 6–20)
CALCIUM SERPL-MCNC: 8.8 MG/DL (ref 8.7–10.5)
CHLORIDE SERPL-SCNC: 105 MMOL/L (ref 95–110)
CHOLEST SERPL-MCNC: 218 MG/DL (ref 120–199)
CHOLEST/HDLC SERPL: 2.4 {RATIO} (ref 2–5)
CO2 SERPL-SCNC: 28 MMOL/L (ref 23–29)
CREAT SERPL-MCNC: 0.7 MG/DL (ref 0.5–1.4)
EST. GFR  (AFRICAN AMERICAN): >60 ML/MIN/1.73 M^2
EST. GFR  (NON AFRICAN AMERICAN): >60 ML/MIN/1.73 M^2
ESTIMATED AVG GLUCOSE: 111 MG/DL (ref 68–131)
GLUCOSE SERPL-MCNC: 93 MG/DL (ref 70–110)
HBA1C MFR BLD: 5.5 % (ref 4–5.6)
HDLC SERPL-MCNC: 92 MG/DL (ref 40–75)
HDLC SERPL: 42.2 % (ref 20–50)
LDLC SERPL CALC-MCNC: 96.8 MG/DL (ref 63–159)
NONHDLC SERPL-MCNC: 126 MG/DL
POTASSIUM SERPL-SCNC: 3.8 MMOL/L (ref 3.5–5.1)
SODIUM SERPL-SCNC: 142 MMOL/L (ref 136–145)
TRIGL SERPL-MCNC: 146 MG/DL (ref 30–150)

## 2021-11-30 PROCEDURE — 99396 PREV VISIT EST AGE 40-64: CPT | Mod: S$GLB,,, | Performed by: INTERNAL MEDICINE

## 2021-11-30 PROCEDURE — 80048 BASIC METABOLIC PNL TOTAL CA: CPT | Performed by: INTERNAL MEDICINE

## 2021-11-30 PROCEDURE — 99999 PR PBB SHADOW E&M-EST. PATIENT-LVL III: CPT | Mod: PBBFAC,,, | Performed by: INTERNAL MEDICINE

## 2021-11-30 PROCEDURE — 80061 LIPID PANEL: CPT | Performed by: INTERNAL MEDICINE

## 2021-11-30 PROCEDURE — 4010F PR ACE/ARB THEARPY RXD/TAKEN: ICD-10-PCS | Mod: CPTII,S$GLB,, | Performed by: INTERNAL MEDICINE

## 2021-11-30 PROCEDURE — 83036 HEMOGLOBIN GLYCOSYLATED A1C: CPT | Performed by: INTERNAL MEDICINE

## 2021-11-30 PROCEDURE — 36415 COLL VENOUS BLD VENIPUNCTURE: CPT | Performed by: INTERNAL MEDICINE

## 2021-11-30 PROCEDURE — 99396 PR PREVENTIVE VISIT,EST,40-64: ICD-10-PCS | Mod: S$GLB,,, | Performed by: INTERNAL MEDICINE

## 2021-11-30 PROCEDURE — 4010F ACE/ARB THERAPY RXD/TAKEN: CPT | Mod: CPTII,S$GLB,, | Performed by: INTERNAL MEDICINE

## 2021-11-30 PROCEDURE — 99999 PR PBB SHADOW E&M-EST. PATIENT-LVL III: ICD-10-PCS | Mod: PBBFAC,,, | Performed by: INTERNAL MEDICINE

## 2021-11-30 RX ORDER — ROSUVASTATIN CALCIUM 10 MG/1
10 TABLET, COATED ORAL DAILY
Qty: 90 TABLET | Refills: 3 | Status: SHIPPED | OUTPATIENT
Start: 2021-11-30 | End: 2022-07-22 | Stop reason: SDUPTHER

## 2021-11-30 RX ORDER — LOSARTAN POTASSIUM 25 MG/1
25 TABLET ORAL DAILY
Qty: 90 TABLET | Refills: 3 | Status: SHIPPED | OUTPATIENT
Start: 2021-11-30 | End: 2022-07-22 | Stop reason: SDUPTHER

## 2021-12-02 ENCOUNTER — TELEPHONE (OUTPATIENT)
Dept: INTERNAL MEDICINE | Facility: CLINIC | Age: 41
End: 2021-12-02
Payer: COMMERCIAL

## 2021-12-02 ENCOUNTER — PATIENT MESSAGE (OUTPATIENT)
Dept: INTERNAL MEDICINE | Facility: CLINIC | Age: 41
End: 2021-12-02
Payer: COMMERCIAL

## 2021-12-07 ENCOUNTER — PATIENT MESSAGE (OUTPATIENT)
Dept: INTERNAL MEDICINE | Facility: CLINIC | Age: 41
End: 2021-12-07
Payer: COMMERCIAL

## 2021-12-13 ENCOUNTER — TELEPHONE (OUTPATIENT)
Dept: INTERNAL MEDICINE | Facility: CLINIC | Age: 41
End: 2021-12-13
Payer: COMMERCIAL

## 2021-12-14 ENCOUNTER — TELEPHONE (OUTPATIENT)
Dept: INTERNAL MEDICINE | Facility: CLINIC | Age: 41
End: 2021-12-14
Payer: COMMERCIAL

## 2021-12-17 ENCOUNTER — OFFICE VISIT (OUTPATIENT)
Dept: URGENT CARE | Facility: CLINIC | Age: 41
End: 2021-12-17
Payer: COMMERCIAL

## 2021-12-17 VITALS
TEMPERATURE: 102 F | HEIGHT: 60 IN | BODY MASS INDEX: 35.34 KG/M2 | RESPIRATION RATE: 16 BRPM | WEIGHT: 180 LBS | DIASTOLIC BLOOD PRESSURE: 82 MMHG | OXYGEN SATURATION: 98 % | SYSTOLIC BLOOD PRESSURE: 129 MMHG | HEART RATE: 82 BPM

## 2021-12-17 DIAGNOSIS — U07.1 COVID-19 VIRUS DETECTED: ICD-10-CM

## 2021-12-17 DIAGNOSIS — U07.1 COVID-19 VIRUS DETECTED: Primary | ICD-10-CM

## 2021-12-17 DIAGNOSIS — R50.9 FEVER, UNSPECIFIED FEVER CAUSE: ICD-10-CM

## 2021-12-17 LAB
CTP QC/QA: YES
SARS-COV-2 RDRP RESP QL NAA+PROBE: POSITIVE

## 2021-12-17 PROCEDURE — 99213 OFFICE O/P EST LOW 20 MIN: CPT | Mod: S$GLB,,, | Performed by: FAMILY MEDICINE

## 2021-12-17 PROCEDURE — U0002 COVID-19 LAB TEST NON-CDC: HCPCS | Mod: QW,CR,S$GLB, | Performed by: FAMILY MEDICINE

## 2021-12-17 PROCEDURE — 4010F PR ACE/ARB THEARPY RXD/TAKEN: ICD-10-PCS | Mod: CPTII,S$GLB,, | Performed by: FAMILY MEDICINE

## 2021-12-17 PROCEDURE — U0002: ICD-10-PCS | Mod: QW,CR,S$GLB, | Performed by: FAMILY MEDICINE

## 2021-12-17 PROCEDURE — 4010F ACE/ARB THERAPY RXD/TAKEN: CPT | Mod: CPTII,S$GLB,, | Performed by: FAMILY MEDICINE

## 2021-12-17 PROCEDURE — 99213 PR OFFICE/OUTPT VISIT, EST, LEVL III, 20-29 MIN: ICD-10-PCS | Mod: S$GLB,,, | Performed by: FAMILY MEDICINE

## 2021-12-17 RX ORDER — IBUPROFEN 200 MG
800 TABLET ORAL
Status: COMPLETED | OUTPATIENT
Start: 2021-12-17 | End: 2021-12-17

## 2021-12-17 RX ADMIN — Medication 800 MG: at 06:12

## 2021-12-29 ENCOUNTER — PATIENT MESSAGE (OUTPATIENT)
Dept: INTERNAL MEDICINE | Facility: CLINIC | Age: 41
End: 2021-12-29
Payer: COMMERCIAL

## 2021-12-29 ENCOUNTER — TELEPHONE (OUTPATIENT)
Dept: SLEEP MEDICINE | Facility: CLINIC | Age: 41
End: 2021-12-29
Payer: COMMERCIAL

## 2021-12-29 ENCOUNTER — OFFICE VISIT (OUTPATIENT)
Dept: SLEEP MEDICINE | Facility: CLINIC | Age: 41
End: 2021-12-29
Payer: COMMERCIAL

## 2021-12-29 DIAGNOSIS — E55.9 VITAMIN D DEFICIENCY DISEASE: ICD-10-CM

## 2021-12-29 DIAGNOSIS — Z90.3 HISTORY OF LAPAROSCOPIC PARTIAL GASTRECTOMY: ICD-10-CM

## 2021-12-29 DIAGNOSIS — K21.9 GASTROESOPHAGEAL REFLUX DISEASE WITHOUT ESOPHAGITIS: ICD-10-CM

## 2021-12-29 DIAGNOSIS — R53.83 FATIGUE, UNSPECIFIED TYPE: Primary | ICD-10-CM

## 2021-12-29 DIAGNOSIS — E78.5 HYPERLIPIDEMIA, UNSPECIFIED HYPERLIPIDEMIA TYPE: ICD-10-CM

## 2021-12-29 DIAGNOSIS — E53.8 VITAMIN B12 DEFICIENCY: ICD-10-CM

## 2021-12-29 DIAGNOSIS — I10 PRIMARY HYPERTENSION: ICD-10-CM

## 2021-12-29 PROCEDURE — 1160F PR REVIEW ALL MEDS BY PRESCRIBER/CLIN PHARMACIST DOCUMENTED: ICD-10-PCS | Mod: CPTII,95,, | Performed by: INTERNAL MEDICINE

## 2021-12-29 PROCEDURE — 1159F MED LIST DOCD IN RCRD: CPT | Mod: CPTII,95,, | Performed by: INTERNAL MEDICINE

## 2021-12-29 PROCEDURE — 1160F RVW MEDS BY RX/DR IN RCRD: CPT | Mod: CPTII,95,, | Performed by: INTERNAL MEDICINE

## 2021-12-29 PROCEDURE — 3044F HG A1C LEVEL LT 7.0%: CPT | Mod: CPTII,95,, | Performed by: INTERNAL MEDICINE

## 2021-12-29 PROCEDURE — 4010F PR ACE/ARB THEARPY RXD/TAKEN: ICD-10-PCS | Mod: CPTII,95,, | Performed by: INTERNAL MEDICINE

## 2021-12-29 PROCEDURE — 99202 OFFICE O/P NEW SF 15 MIN: CPT | Mod: 95,,, | Performed by: INTERNAL MEDICINE

## 2021-12-29 PROCEDURE — 99202 PR OFFICE/OUTPT VISIT, NEW, LEVL II, 15-29 MIN: ICD-10-PCS | Mod: 95,,, | Performed by: INTERNAL MEDICINE

## 2021-12-29 PROCEDURE — 1159F PR MEDICATION LIST DOCUMENTED IN MEDICAL RECORD: ICD-10-PCS | Mod: CPTII,95,, | Performed by: INTERNAL MEDICINE

## 2021-12-29 PROCEDURE — 4010F ACE/ARB THERAPY RXD/TAKEN: CPT | Mod: CPTII,95,, | Performed by: INTERNAL MEDICINE

## 2021-12-29 PROCEDURE — 3044F PR MOST RECENT HEMOGLOBIN A1C LEVEL <7.0%: ICD-10-PCS | Mod: CPTII,95,, | Performed by: INTERNAL MEDICINE

## 2022-01-06 ENCOUNTER — HOSPITAL ENCOUNTER (OUTPATIENT)
Dept: SLEEP MEDICINE | Facility: OTHER | Age: 42
Discharge: HOME | End: 2022-01-06
Attending: INTERNAL MEDICINE
Payer: COMMERCIAL

## 2022-01-06 ENCOUNTER — TELEPHONE (OUTPATIENT)
Dept: SLEEP MEDICINE | Facility: OTHER | Age: 42
End: 2022-01-06
Payer: COMMERCIAL

## 2022-01-06 DIAGNOSIS — E55.9 VITAMIN D DEFICIENCY DISEASE: ICD-10-CM

## 2022-01-06 DIAGNOSIS — R53.83 FATIGUE, UNSPECIFIED TYPE: ICD-10-CM

## 2022-01-06 DIAGNOSIS — E53.8 VITAMIN B12 DEFICIENCY: ICD-10-CM

## 2022-01-06 DIAGNOSIS — K21.9 GASTROESOPHAGEAL REFLUX DISEASE WITHOUT ESOPHAGITIS: ICD-10-CM

## 2022-01-06 DIAGNOSIS — I10 PRIMARY HYPERTENSION: ICD-10-CM

## 2022-01-06 DIAGNOSIS — Z90.3 HISTORY OF LAPAROSCOPIC PARTIAL GASTRECTOMY: ICD-10-CM

## 2022-01-06 DIAGNOSIS — E78.5 HYPERLIPIDEMIA, UNSPECIFIED HYPERLIPIDEMIA TYPE: ICD-10-CM

## 2022-01-06 PROCEDURE — 95806 SLEEP STUDY UNATT&RESP EFFT: CPT | Mod: 26,,, | Performed by: INTERNAL MEDICINE

## 2022-01-06 PROCEDURE — 95800 SLP STDY UNATTENDED: CPT

## 2022-01-06 PROCEDURE — 95806 PR SLEEP STUDY, UNATTENDED, SIMUL RECORD HR/O2 SAT/RESP FLOW/RESP EFFT: ICD-10-PCS | Mod: 26,,, | Performed by: INTERNAL MEDICINE

## 2022-01-06 NOTE — PROGRESS NOTES
Per physician orders, patient was given home sleep testing device and instructed on how to apply the device before going to bed tonight.  I sized the device and showed the patient using a mirror how the device fits and what it should look like so they can use a mirror when putting it on themselves at home.  We reviewed the instruction booklet and the number to call if they have any questions at night.  Patient understood and was instructed to return the device the next day back to the sleep lab.   This document is complete and the patient is ready for discharge.

## 2022-01-17 ENCOUNTER — PATIENT MESSAGE (OUTPATIENT)
Dept: SLEEP MEDICINE | Facility: CLINIC | Age: 42
End: 2022-01-17
Payer: COMMERCIAL

## 2022-01-17 DIAGNOSIS — I10 PRIMARY HYPERTENSION: ICD-10-CM

## 2022-01-17 DIAGNOSIS — G47.33 OSA (OBSTRUCTIVE SLEEP APNEA): Primary | ICD-10-CM

## 2022-01-17 NOTE — PROCEDURES
"The sleep study that you ordered is complete.  You have ordered sleep LAB services to perform the sleep study for Chasity Kwan     Please find Sleep Study result in  the "Media tab" of Chart Review menu.     Patient is already established with a Sleep Medicine provider        For any questions, please contact our clinic staff at [phone] to talk to clinical staff.     "

## 2022-01-19 ENCOUNTER — PATIENT MESSAGE (OUTPATIENT)
Dept: SLEEP MEDICINE | Facility: CLINIC | Age: 42
End: 2022-01-19
Payer: COMMERCIAL

## 2022-01-26 DIAGNOSIS — Z01.818 PRE-OP TESTING: ICD-10-CM

## 2022-02-28 ENCOUNTER — PATIENT MESSAGE (OUTPATIENT)
Dept: ORTHOPEDICS | Facility: CLINIC | Age: 42
End: 2022-02-28
Payer: COMMERCIAL

## 2022-03-02 ENCOUNTER — TELEPHONE (OUTPATIENT)
Dept: ORTHOPEDICS | Facility: CLINIC | Age: 42
End: 2022-03-02
Payer: COMMERCIAL

## 2022-03-08 ENCOUNTER — OFFICE VISIT (OUTPATIENT)
Dept: ORTHOPEDICS | Facility: CLINIC | Age: 42
End: 2022-03-08
Payer: COMMERCIAL

## 2022-03-08 DIAGNOSIS — M54.16 LUMBAR RADICULOPATHY: Primary | ICD-10-CM

## 2022-03-08 DIAGNOSIS — M54.41 ACUTE BILATERAL LOW BACK PAIN WITH RIGHT-SIDED SCIATICA: ICD-10-CM

## 2022-03-08 PROCEDURE — 99212 PR OFFICE/OUTPT VISIT, EST, LEVL II, 10-19 MIN: ICD-10-PCS | Mod: 95,,, | Performed by: PHYSICIAN ASSISTANT

## 2022-03-08 PROCEDURE — 4010F ACE/ARB THERAPY RXD/TAKEN: CPT | Mod: CPTII,95,, | Performed by: PHYSICIAN ASSISTANT

## 2022-03-08 PROCEDURE — 4010F PR ACE/ARB THEARPY RXD/TAKEN: ICD-10-PCS | Mod: CPTII,95,, | Performed by: PHYSICIAN ASSISTANT

## 2022-03-08 PROCEDURE — 1160F PR REVIEW ALL MEDS BY PRESCRIBER/CLIN PHARMACIST DOCUMENTED: ICD-10-PCS | Mod: CPTII,95,, | Performed by: PHYSICIAN ASSISTANT

## 2022-03-08 PROCEDURE — 1160F RVW MEDS BY RX/DR IN RCRD: CPT | Mod: CPTII,95,, | Performed by: PHYSICIAN ASSISTANT

## 2022-03-08 PROCEDURE — 1159F MED LIST DOCD IN RCRD: CPT | Mod: CPTII,95,, | Performed by: PHYSICIAN ASSISTANT

## 2022-03-08 PROCEDURE — 1159F PR MEDICATION LIST DOCUMENTED IN MEDICAL RECORD: ICD-10-PCS | Mod: CPTII,95,, | Performed by: PHYSICIAN ASSISTANT

## 2022-03-08 PROCEDURE — 99212 OFFICE O/P EST SF 10 MIN: CPT | Mod: 95,,, | Performed by: PHYSICIAN ASSISTANT

## 2022-03-08 RX ORDER — METHYLPREDNISOLONE 4 MG/1
TABLET ORAL
Qty: 21 EACH | Refills: 0 | Status: SHIPPED | OUTPATIENT
Start: 2022-03-08 | End: 2022-03-29

## 2022-03-08 RX ORDER — CYCLOBENZAPRINE HCL 10 MG
10 TABLET ORAL 3 TIMES DAILY PRN
Qty: 30 TABLET | Refills: 0 | Status: SHIPPED | OUTPATIENT
Start: 2022-03-08 | End: 2022-03-18

## 2022-03-08 NOTE — PROGRESS NOTES
Telemedicine/Virtual Visit Documentation:     The patient location is: home    The chief complaint leading to consultation is: see HPI    VISIT TYPE X   Virtual visit with synchronous audio and video X   Telephone E/M service: conducted via a two way audio call      Total time spent with patient: see X jailyn on chart below.   More than half of the time was spent counseling or coordinating care including prognosis, differential diagnosis, risks and benefits of treatment, instructions, compliance risk reductions     VIDEO EST MINUTES X   16267 5    [id number] 10 X   99213 15    99901 25    54120 40      Each patient to whom he or she provides medical services by telemedicine is:  (1) informed of the relationship between the physician and patient and the respective role of any other health care provider with respect to management of the patient; and (2) notified that he or she may decline to receive medical services by telemedicine and may withdraw from such care at any time.    Subjective:   Chief complaint: Initial evaluation of low back pain and leg pain    HPI:   Chasity Kwan is a 42 y.o. female who presents today to discuss low back and right leg pain for 2 weeks. The pain is down the posterior thigh to the knee. She denies injury.  She reports a history of this problem in the past- was treated in [place] in 2019 for this - had PT, MRI, and ERLINDA without much improvement.  Her pain flares occasionally.  She has tried aleve, rest, heat and stretching without resolution of current symptoms.  She denies numbness, tingling, weakness and bowel/bladder incontinence.    ROS:  Per HPI       Objective:   Exam:  General: No acute distress, well-appearing  Neurologic: Alert and oriented x3  Psychiatric: Appropriate mood and affect, cooperative    Imaging:  No imaging reviewed        Assessment:     1. Lumbar radiculopathy    2. Acute bilateral low back pain with right-sided sciatica         Plan:       42 year old  female with acute low back pain with radiculopathy      Past Medical History:   Diagnosis Date    Abnormal Pap smear 2009    colpo    Genital herpes in women     H/O: myomectomy     EX lap    History of uterine fibroid     Hyperlipidemia     Hypertension     Morbid obesity     Pseudotumor cerebri        Past Surgical History:   Procedure Laterality Date    ESOPHAGOGASTRODUODENOSCOPY      GASTRECTOMY      gastric sleeve  2015    LAPAROTOMY      SI joint injection         Family History   Problem Relation Age of Onset    Hypertension Mother     Hyperlipidemia Mother     Graves' disease Mother     Obesity Mother     Hyperlipidemia Sister     Obesity Sister     Colon cancer Father 50    Cataracts Father     Cancer Father         colon, liver, and stomach    Heart disease Maternal Grandfather     Stroke Maternal Grandmother     Diabetes Paternal Grandmother     No Known Problems Paternal Grandfather     Breast cancer Neg Hx     Ovarian cancer Neg Hx        Social History     Socioeconomic History    Marital status: Single   Occupational History     Employer: [employer]   Tobacco Use    Smoking status: Never Smoker    Smokeless tobacco: Never Used   Substance and Sexual Activity    Alcohol use: Yes     Comment: occasionally    Drug use: No    Sexual activity: Not Currently     Partners: Male     Birth control/protection: OCP       - Progress note from [facility] reviewed- small disc bulge at L4/5 eccentric to right  - Medrol dose pack and flexeril sent to pharmacy  - Recommend follow up in person with imaging in 2-4 weeks if no improvement

## 2022-03-11 ENCOUNTER — IMMUNIZATION (OUTPATIENT)
Dept: PHARMACY | Facility: CLINIC | Age: 42
End: 2022-03-11
Payer: COMMERCIAL

## 2022-03-11 DIAGNOSIS — Z23 NEED FOR VACCINATION: Primary | ICD-10-CM

## 2022-03-22 ENCOUNTER — PATIENT MESSAGE (OUTPATIENT)
Dept: ORTHOPEDICS | Facility: CLINIC | Age: 42
End: 2022-03-22
Payer: COMMERCIAL

## 2022-04-08 ENCOUNTER — TELEPHONE (OUTPATIENT)
Dept: ORTHOPEDICS | Facility: CLINIC | Age: 42
End: 2022-04-08
Payer: COMMERCIAL

## 2022-04-08 ENCOUNTER — PATIENT MESSAGE (OUTPATIENT)
Dept: ORTHOPEDICS | Facility: CLINIC | Age: 42
End: 2022-04-08
Payer: COMMERCIAL

## 2022-04-08 NOTE — TELEPHONE ENCOUNTER
Spoke to patient in regards to scheduling an appointment with pain management. Patient scheduled to see Dr. Aj Carpenter on 04/21/2022 for 2:30pm. Patient stated thank you. Thanks.

## 2022-04-20 ENCOUNTER — TELEPHONE (OUTPATIENT)
Dept: PAIN MEDICINE | Facility: CLINIC | Age: 42
End: 2022-04-20
Payer: COMMERCIAL

## 2022-04-20 NOTE — TELEPHONE ENCOUNTER
This message is for patient in regards to his/her appointment 04/21/22 at 02:30p       [facility] Policy: For the safety of all patients and staff members.     Patient Visitor policy: During this visit we are asking all patients to only have one visitor over the age of 18yrs old to accompany them to be seen by Dr. Aj Bruce MD. If patient do not required assistance with their visit, we're asking all visitors to remain outside the waiting area.    Upon arriving to your schedule appointment; patients are required to wear a face mask, if patient do not have a face mask one will be provided entering the facility. If you have any questions or concerns please contact [phone]       also inquired IPM within message.    [facility] providers and Mid-levels offer interventional, procedure--based options to treat chronic pain. The goal is to manage chronic pain by reducing pain frequency and intensity and address your functional goals for activities of daily living while simultaneously reducing or eliminating your reliance on medications. Please bring any records or images that you have from prior treatments for your pain. You will be presented with multi-modal treatment plan that may or may not include imaging, interventional procedures, physical/occupational/aqua therapy, pain creams, and non-narcotic pain medications used for the treatments of chronic pain.      Pt verbalized understanding and confirmed appt

## 2022-06-09 ENCOUNTER — PATIENT MESSAGE (OUTPATIENT)
Dept: NEUROLOGY | Facility: CLINIC | Age: 42
End: 2022-06-09
Payer: COMMERCIAL

## 2022-06-10 ENCOUNTER — TELEPHONE (OUTPATIENT)
Dept: NEUROLOGY | Facility: CLINIC | Age: 42
End: 2022-06-10
Payer: COMMERCIAL

## 2022-06-10 ENCOUNTER — PATIENT MESSAGE (OUTPATIENT)
Dept: NEUROLOGY | Facility: CLINIC | Age: 42
End: 2022-06-10
Payer: COMMERCIAL

## 2022-06-10 NOTE — TELEPHONE ENCOUNTER
----- Message from Margi Dominguez sent at 6/10/2022 11:39 AM CDT -----  Type:  Needs Medical Advice    Who Called: pt  Symptoms (please be specific): pt needs refills on medication flexeril and medrol dose pack    Would the patient rather a call back or a response via [employer]? Please call  Best Call Back Number: [phone]  Additional Information: this medication is not listed on pt medication list

## 2022-06-10 NOTE — TELEPHONE ENCOUNTER
Replied via [facility] advising patient that she needs to make an appointment prior to receiving refills because we have not seen her since 2020

## 2022-06-24 ENCOUNTER — PATIENT MESSAGE (OUTPATIENT)
Dept: NEUROLOGY | Facility: CLINIC | Age: 42
End: 2022-06-24
Payer: COMMERCIAL

## 2022-07-04 ENCOUNTER — PATIENT MESSAGE (OUTPATIENT)
Dept: NEUROLOGY | Facility: CLINIC | Age: 42
End: 2022-07-04
Payer: COMMERCIAL

## 2022-07-07 ENCOUNTER — OFFICE VISIT (OUTPATIENT)
Dept: SPINE | Facility: CLINIC | Age: 42
End: 2022-07-07
Payer: COMMERCIAL

## 2022-07-07 VITALS
HEART RATE: 74 BPM | RESPIRATION RATE: 18 BRPM | DIASTOLIC BLOOD PRESSURE: 78 MMHG | BODY MASS INDEX: 42.35 KG/M2 | HEIGHT: 55 IN | TEMPERATURE: 97 F | OXYGEN SATURATION: 100 % | WEIGHT: 183 LBS | SYSTOLIC BLOOD PRESSURE: 110 MMHG

## 2022-07-07 DIAGNOSIS — M79.18 MYOFASCIAL PAIN: Primary | ICD-10-CM

## 2022-07-07 DIAGNOSIS — G89.4 CHRONIC PAIN DISORDER: ICD-10-CM

## 2022-07-07 DIAGNOSIS — M50.30 DDD (DEGENERATIVE DISC DISEASE), CERVICAL: ICD-10-CM

## 2022-07-07 DIAGNOSIS — M47.812 CERVICAL SPONDYLOSIS: ICD-10-CM

## 2022-07-07 PROCEDURE — 3008F PR BODY MASS INDEX (BMI) DOCUMENTED: ICD-10-PCS | Mod: CPTII,S$GLB,, | Performed by: ANESTHESIOLOGY

## 2022-07-07 PROCEDURE — 20552 NJX 1/MLT TRIGGER POINT 1/2: CPT | Mod: S$GLB,,, | Performed by: ANESTHESIOLOGY

## 2022-07-07 PROCEDURE — 99999 PR PBB SHADOW E&M-EST. PATIENT-LVL V: CPT | Mod: PBBFAC,,, | Performed by: ANESTHESIOLOGY

## 2022-07-07 PROCEDURE — 99999 PR PBB SHADOW E&M-EST. PATIENT-LVL V: ICD-10-PCS | Mod: PBBFAC,,, | Performed by: ANESTHESIOLOGY

## 2022-07-07 PROCEDURE — 3008F BODY MASS INDEX DOCD: CPT | Mod: CPTII,S$GLB,, | Performed by: ANESTHESIOLOGY

## 2022-07-07 PROCEDURE — 20552 PR INJECT TRIGGER POINT, 1 OR 2: ICD-10-PCS | Mod: S$GLB,,, | Performed by: ANESTHESIOLOGY

## 2022-07-07 PROCEDURE — 4010F PR ACE/ARB THEARPY RXD/TAKEN: ICD-10-PCS | Mod: CPTII,S$GLB,, | Performed by: ANESTHESIOLOGY

## 2022-07-07 PROCEDURE — 3074F SYST BP LT 130 MM HG: CPT | Mod: CPTII,S$GLB,, | Performed by: ANESTHESIOLOGY

## 2022-07-07 PROCEDURE — 1159F PR MEDICATION LIST DOCUMENTED IN MEDICAL RECORD: ICD-10-PCS | Mod: CPTII,S$GLB,, | Performed by: ANESTHESIOLOGY

## 2022-07-07 PROCEDURE — 99204 OFFICE O/P NEW MOD 45 MIN: CPT | Mod: 25,S$GLB,, | Performed by: ANESTHESIOLOGY

## 2022-07-07 PROCEDURE — 3078F PR MOST RECENT DIASTOLIC BLOOD PRESSURE < 80 MM HG: ICD-10-PCS | Mod: CPTII,S$GLB,, | Performed by: ANESTHESIOLOGY

## 2022-07-07 PROCEDURE — 3074F PR MOST RECENT SYSTOLIC BLOOD PRESSURE < 130 MM HG: ICD-10-PCS | Mod: CPTII,S$GLB,, | Performed by: ANESTHESIOLOGY

## 2022-07-07 PROCEDURE — 1160F PR REVIEW ALL MEDS BY PRESCRIBER/CLIN PHARMACIST DOCUMENTED: ICD-10-PCS | Mod: CPTII,S$GLB,, | Performed by: ANESTHESIOLOGY

## 2022-07-07 PROCEDURE — 4010F ACE/ARB THERAPY RXD/TAKEN: CPT | Mod: CPTII,S$GLB,, | Performed by: ANESTHESIOLOGY

## 2022-07-07 PROCEDURE — 1160F RVW MEDS BY RX/DR IN RCRD: CPT | Mod: CPTII,S$GLB,, | Performed by: ANESTHESIOLOGY

## 2022-07-07 PROCEDURE — 99204 PR OFFICE/OUTPT VISIT, NEW, LEVL IV, 45-59 MIN: ICD-10-PCS | Mod: 25,S$GLB,, | Performed by: ANESTHESIOLOGY

## 2022-07-07 PROCEDURE — 1159F MED LIST DOCD IN RCRD: CPT | Mod: CPTII,S$GLB,, | Performed by: ANESTHESIOLOGY

## 2022-07-07 PROCEDURE — 3078F DIAST BP <80 MM HG: CPT | Mod: CPTII,S$GLB,, | Performed by: ANESTHESIOLOGY

## 2022-07-07 RX ORDER — CYCLOBENZAPRINE HCL 5 MG
5 TABLET ORAL 3 TIMES DAILY PRN
Qty: 90 TABLET | Refills: 1 | Status: SHIPPED | OUTPATIENT
Start: 2022-07-07 | End: 2022-07-22

## 2022-07-07 RX ORDER — CYCLOBENZAPRINE HCL 5 MG
5 TABLET ORAL 3 TIMES DAILY PRN
Qty: 90 TABLET | Refills: 0 | Status: SHIPPED | OUTPATIENT
Start: 2022-07-07 | End: 2022-08-06

## 2022-07-07 RX ORDER — DEXAMETHASONE SODIUM PHOSPHATE 100 MG/10ML
10 INJECTION INTRAMUSCULAR; INTRAVENOUS ONCE
Status: COMPLETED | OUTPATIENT
Start: 2022-07-07 | End: 2022-07-07

## 2022-07-07 RX ADMIN — DEXAMETHASONE SODIUM PHOSPHATE 10 MG: 100 INJECTION INTRAMUSCULAR; INTRAVENOUS at 09:07

## 2022-07-07 NOTE — PROGRESS NOTES
Chronic Pain - New Consult    Referring Physician: Serafin Montilla    Chief Complaint:   Chief Complaint   Patient presents with    Shoulder Pain        SUBJECTIVE:    Mrs. Kwan is a 43 yo female that presents today for initial evaluation of right neck and shoulder pain . She reports that her pain started 3 weeks; denies any inciting event or trauma. She reports that she did get a shoulder MRI about a year that did show a tear in her rotator cuff. However she reports that this pain is different from her shoulder pain she had a year ago stating that it more from the neck.      Describes the pain as sharp/achy/ and like a muscle pull in nature and rates her pain a 3/10 in the morning but worsens throughout the day and she rates it a 6/10. Pain is exacerbated by overhead movement and activity. Pain is relieved by heat, Acupuncture, Cyclobenzaprine. She reports that the pain does wake her up from sleep occasionally. She has not been to PT but has been doing HEP.  Denies focal weakness, headaches, blurred vision, dizziness, no dermatomal pattern of numbness/tingling.     Physical Therapy/Home Exercise: no      Pain Disability Index Review:  Last 3 PDI Scores 7/7/2022   Pain Disability Index (PDI) 0       Pain Medications:    - Others: Cyclobenzaprine     report:  Not applicable    Pain Procedures:   None     Imaging:  3/26/21  EXAMINATION:  XR SHOULDER TRAUMA 3 VIEW RIGHT     CLINICAL HISTORY:  Pain in right shoulder     TECHNIQUE:  Three  views of the right shoulder were performed.     COMPARISON:  None     FINDINGS:  No evidence of an acute fracture or dislocation.  No lytic or blastic lesion.  No significant joint space loss or degenerative changes.  Glenohumeral joint is intact.  Acromioclavicular joint is intact.  Soft tissues are within normal limits.     Impression:     Normal radiograph of the right shoulder    EXAMINATION:  MRI SHOULDER WITHOUT CONTRAST RIGHT     CLINICAL HISTORY:  Shoulder pain,  rotator cuff disorder suspected, nondiagnostic xray;  Pain in right shoulder     TECHNIQUE:  Multisequence, multiplanar MR imaging of the shoulder performed without contrast.     COMPARISON:  03/26/2021 radiographs     FINDINGS:  Very mild increased signal at the bursal surface of the supraspinatus tendon consistent with minimal bursal surface fraying or tendinosis.  There is no tear.     The infraspinatus tendon appears normal.     The teres minor muscle and tendon appear normal.     There is focal thickening and signal change of the subscapularis tendon with an 8 mm transverse diameter area of interstitial tear.     The biceps tendon appears intact.     AC joint mild osseous hypertrophy, no significant associated edema.     No significant fluid within the subacromial subdeltoid bursa or within the glenohumeral joint.     The glenoid and labrum appear to be intact.     The humeral head is intact.     The remainder of the visualized soft tissue and osseous structures appear normal.     Impression:     Focal area of interstitial tear within the superior fibers of the subscapularis tendon.    Past Medical History:   Diagnosis Date    Abnormal Pap smear 2009    colpo    Genital herpes in women     H/O: myomectomy     EX lap    History of uterine fibroid     Hyperlipidemia     Hypertension     Morbid obesity     Pseudotumor cerebri      Past Surgical History:   Procedure Laterality Date    ESOPHAGOGASTRODUODENOSCOPY      GASTRECTOMY      gastric sleeve  2015    LAPAROTOMY      SI joint injection       Social History     Socioeconomic History    Marital status: Single   Occupational History     Employer: [employer]   Tobacco Use    Smoking status: Never Smoker    Smokeless tobacco: Never Used   Substance and Sexual Activity    Alcohol use: Yes     Comment: occasionally    Drug use: No    Sexual activity: Not Currently     Partners: Male     Birth control/protection: OCP     Family History    Problem Relation Age of Onset    Hypertension Mother     Hyperlipidemia Mother     Graves' disease Mother     Obesity Mother     Hyperlipidemia Sister     Obesity Sister     Colon cancer Father 50    Cataracts Father     Cancer Father         colon, liver, and stomach    Heart disease Maternal Grandfather     Stroke Maternal Grandmother     Diabetes Paternal Grandmother     No Known Problems Paternal Grandfather     Breast cancer Neg Hx     Ovarian cancer Neg Hx        Review of patient's allergies indicates:  No Known Allergies    Current Outpatient Medications   Medication Sig    clonazePAM (KLONOPIN) 0.5 MG tablet Take 0.5 mg by mouth daily as needed.     clotrimazole-betamethasone 1-0.05% (LOTRISONE) cream Apply to affected area 2 times daily    drospirenone-ethinyl estradioL (TIFFANIE) 3-0.02 mg per tablet Take 1 tablet by mouth once daily.    EScitalopram oxalate (LEXAPRO) 20 MG tablet     losartan (COZAAR) 25 MG tablet Take 1 tablet (25 mg total) by mouth once daily.    multivitamin (THERAGRAN) per tablet Take 1 tablet by mouth once daily. Patient to stop[ 1 week before surgery starting 3/18/15    propranoloL (INDERAL) 10 MG tablet     rosuvastatin (CRESTOR) 10 MG tablet Take 1 tablet (10 mg total) by mouth once daily.    valACYclovir (VALTREX) 500 MG tablet Take 1 tablet (500 mg total) by mouth daily as needed.    zolpidem (AMBIEN CR) 6.25 MG CR tablet Take 1 tablet by mouth every evening.     cyclobenzaprine (FLEXERIL) 5 MG tablet Take 1 tablet (5 mg total) by mouth 3 (three) times daily as needed for Muscle spasms.    cyclobenzaprine (FLEXERIL) 5 MG tablet Take 1 tablet (5 mg total) by mouth 3 (three) times daily as needed for Muscle spasms.    omeprazole (PRILOSEC) 40 MG capsule Take 1 capsule (40 mg total) by mouth every morning. Open capsule and take contents by mouth     No current facility-administered medications for this visit.       REVIEW OF SYSTEMS:    GENERAL:  No weight  loss, malaise or fevers.  HEENT:  Negative for frequent or significant headaches.  NECK:  Negative for lumps, goiter, pain and significant neck swelling.  RESPIRATORY:  Negative for cough, wheezing or shortness of breath.  CARDIOVASCULAR:  Negative for chest pain, leg swelling or palpitations.  GI:  Negative for abdominal discomfort, blood in stools or black stools or change in bowel habits.  MUSCULOSKELETAL:  See HPI.  SKIN:  Negative for lesions, rash, and itching.  PSYCH:  Negative for sleep disturbance, mood disorder and recent psychosocial stressors.  HEMATOLOGY/LYMPHOLOGY:  Negative for prolonged bleeding, bruising easily or swollen nodes.  NEURO:   No history of headaches, syncope, paralysis, seizures or tremors.  All other reviewed and negative other than HPI.    OBJECTIVE:    /78 (BP Location: Right arm, Patient Position: Sitting, BP Method: Medium (Automatic))   Pulse 74   Temp 97.3 °F (36.3 °C) (Temporal)   Resp 18   Ht 4' (1.219 m)   Wt 83 kg (183 lb)   SpO2 100%   BMI 55.84 kg/m²     PHYSICAL EXAMINATION:    General appearance: Well appearing, in no acute distress, alert and oriented x3.  Psych:  Mood and affect appropriate.  Skin: Skin color, texture, turgor normal, no rashes or lesions, in both upper and lower body.  Head/face:  Normocephalic, atraumatic. No palpable lymph nodes.  Neck: pain with neck extension, or lateral flexion.  Negative [place] bilaterally   TTP along right Trapezius, levator scapulae, and paravertebral musculature around C7/T1     Cor: RRR  Pulm: CTA  Extremities: No deformities, edema, or skin discoloration. Good capillary refill.  Musculoskeletal: Bilateral upper and lower extremity strength is normal and symmetric.  No atrophy or tone abnormalities are noted.    Right shoulder: Normal.      Left shoulder: Normal.      Cervical back: Normal range of motion. Tenderness present.      Comments: Positive Neer/[place] on the right  Negative [place] bilaterally    Muscles strength 5/5 throughout upper extremities   DTR+2 throughout upper extremities   Neuro: Bilateral upper and lower extremity coordination and muscle stretch reflexes are physiologic and symmetric. No loss of sensation is noted.  Gait: normal.      ASSESSMENT: 42 y.o. year old female with right sided neck pain, consistent with myofascial pain of the upper thoracic and lower cervical region of the spine.      1. Myofascial pain  Ambulatory referral/consult to Physical/Occupational Therapy   2. Chronic pain disorder     3. DDD (degenerative disc disease), cervical     4. Cervical spondylosis           PLAN:     1. Discussed with patient that her right sided neck pain is most likely from myofascial pain. Trigger point injections done in clinic today to right Trapezius and right paravertebral musculature around C7/T1 region.      2. Refill of Cyclobenzaprine  5mg TID as needed.      3. Referral to Physical Therapy for evaluation, treatment, and dry needling.      4. RTC in 6 weeks or sooner if pain worsens    5. We will consider upper cervical MBB in the future.     Wilbur Gardner DO   [facility] PGY-2    - Counseled patient regarding the importance of physical therapy and to continue to be active at home with HEP.    The above plan and management options were discussed at length with patient. Patient is in agreement with the above and verbalized understanding. It will be communicated with the referring physician via electronic record, fax, or mail.          Patient Name: Chasity Kwan  MRN: [id number]    INFORMED CONSENT: The procedure, risks, benefits and options were discussed with patient. There are no contraindications to the procedure. The patient expressed understanding and agreed to proceed. The personnel performing the procedure was discussed. I verify that I personally obtained Chasity's consent prior to the start of the procedure and the signed consent can be found on the patient's  chart.    Procedure Date: 07/07/2022    Anesthesia: Topical    Pre Procedure diagnosis:   1. Myofascial pain        Post-Procedure diagnosis: same      Sedation: None    PROCEDURE: TRIGGER POINT INJECTION  The patient was placed in a seated position and time out was perfomed. The site of pain and procedure were confirmed with the patient prior to starting the procedure. After performing time out. The patient's  trigger points were identified and marked at right Trapezius and right semispinalis. The skin was prepped with chlorhexidine three times.  After performing time out A 27-gauge 1.5 inch  needle was advanced through the skin and subcutaneous tissues.  Aspiration for blood, air and CSF was negative.  A total of 4 ml of Bupivacaine 0.5% and 10 mg Decadron was injected at all trigger point.  No complications were evident. No specimens collected.    Blood Loss: Nill  Specimen: None    Wilbur Gardner, DO     I have reviewed and concur with the resident's history, physical, assessment, and plan.  I have personally interviewed and examined the patient at bedside.  See below addendum for my evaluation and additional findings.    Marcos Orourke MD

## 2022-07-07 NOTE — PROGRESS NOTES
Chronic Pain - New Consult    Referring Physician: Serafin Montilla    Chief Complaint:   Chief Complaint   Patient presents with    Shoulder Pain        SUBJECTIVE:    Mrs. Kwan is a 43 yo female that presents today for initial evaluation of right neck and shoulder pain . She reports that her pain started 3 weeks; denies any inciting event or trauma. She reports that she did get a shoulder MRI about a year that did show a tear in her rotator cuff. However she reports that this pain is different from her shoulder pain she had a year ago stating that it more from the neck.      Describes the pain as sharp/achy/ and like a muscle pull in nature and rates her pain a 3/10 in the morning but worsens throughout the day and she rates it a 6/10. Pain is exacerbated by overhead movement and activity. Pain is relieved by heat, Acupuncture, Cyclobenzaprine. She reports that the pain does wake her up from sleep occasionally. She has not been to PT but has been doing HEP.  Denies focal weakness, headaches, blurred vision, dizziness, no dermatomal pattern of numbness/tingling.     Physical Therapy/Home Exercise: no      Pain Disability Index Review:  Last 3 PDI Scores 7/7/2022   Pain Disability Index (PDI) 0       Pain Medications:    - Others: Cyclobenzaprine     report:  Not applicable    Pain Procedures:   None     Imaging:  3/26/21  EXAMINATION:  XR SHOULDER TRAUMA 3 VIEW RIGHT     CLINICAL HISTORY:  Pain in right shoulder     TECHNIQUE:  Three  views of the right shoulder were performed.     COMPARISON:  None     FINDINGS:  No evidence of an acute fracture or dislocation.  No lytic or blastic lesion.  No significant joint space loss or degenerative changes.  Glenohumeral joint is intact.  Acromioclavicular joint is intact.  Soft tissues are within normal limits.     Impression:     Normal radiograph of the right shoulder    EXAMINATION:  MRI SHOULDER WITHOUT CONTRAST RIGHT     CLINICAL HISTORY:  Shoulder pain,  rotator cuff disorder suspected, nondiagnostic xray;  Pain in right shoulder     TECHNIQUE:  Multisequence, multiplanar MR imaging of the shoulder performed without contrast.     COMPARISON:  03/26/2021 radiographs     FINDINGS:  Very mild increased signal at the bursal surface of the supraspinatus tendon consistent with minimal bursal surface fraying or tendinosis.  There is no tear.     The infraspinatus tendon appears normal.     The teres minor muscle and tendon appear normal.     There is focal thickening and signal change of the subscapularis tendon with an 8 mm transverse diameter area of interstitial tear.     The biceps tendon appears intact.     AC joint mild osseous hypertrophy, no significant associated edema.     No significant fluid within the subacromial subdeltoid bursa or within the glenohumeral joint.     The glenoid and labrum appear to be intact.     The humeral head is intact.     The remainder of the visualized soft tissue and osseous structures appear normal.     Impression:     Focal area of interstitial tear within the superior fibers of the subscapularis tendon.    Past Medical History:   Diagnosis Date    Abnormal Pap smear 2009    colpo    Genital herpes in women     H/O: myomectomy     EX lap    History of uterine fibroid     Hyperlipidemia     Hypertension     Morbid obesity     Pseudotumor cerebri      Past Surgical History:   Procedure Laterality Date    ESOPHAGOGASTRODUODENOSCOPY      GASTRECTOMY      gastric sleeve  2015    LAPAROTOMY      SI joint injection       Social History     Socioeconomic History    Marital status: Single   Occupational History     Employer: [employer]   Tobacco Use    Smoking status: Never Smoker    Smokeless tobacco: Never Used   Substance and Sexual Activity    Alcohol use: Yes     Comment: occasionally    Drug use: No    Sexual activity: Not Currently     Partners: Male     Birth control/protection: OCP     Family History    Problem Relation Age of Onset    Hypertension Mother     Hyperlipidemia Mother     Graves' disease Mother     Obesity Mother     Hyperlipidemia Sister     Obesity Sister     Colon cancer Father 50    Cataracts Father     Cancer Father         colon, liver, and stomach    Heart disease Maternal Grandfather     Stroke Maternal Grandmother     Diabetes Paternal Grandmother     No Known Problems Paternal Grandfather     Breast cancer Neg Hx     Ovarian cancer Neg Hx        Review of patient's allergies indicates:  No Known Allergies    Current Outpatient Medications   Medication Sig    clonazePAM (KLONOPIN) 0.5 MG tablet Take 0.5 mg by mouth daily as needed.     clotrimazole-betamethasone 1-0.05% (LOTRISONE) cream Apply to affected area 2 times daily    drospirenone-ethinyl estradioL (TIFFANIE) 3-0.02 mg per tablet Take 1 tablet by mouth once daily.    EScitalopram oxalate (LEXAPRO) 20 MG tablet     losartan (COZAAR) 25 MG tablet Take 1 tablet (25 mg total) by mouth once daily.    multivitamin (THERAGRAN) per tablet Take 1 tablet by mouth once daily. Patient to stop[ 1 week before surgery starting 3/18/15    propranoloL (INDERAL) 10 MG tablet     rosuvastatin (CRESTOR) 10 MG tablet Take 1 tablet (10 mg total) by mouth once daily.    valACYclovir (VALTREX) 500 MG tablet Take 1 tablet (500 mg total) by mouth daily as needed.    zolpidem (AMBIEN CR) 6.25 MG CR tablet Take 1 tablet by mouth every evening.     cyclobenzaprine (FLEXERIL) 5 MG tablet Take 1 tablet (5 mg total) by mouth 3 (three) times daily as needed for Muscle spasms.    cyclobenzaprine (FLEXERIL) 5 MG tablet Take 1 tablet (5 mg total) by mouth 3 (three) times daily as needed for Muscle spasms.    omeprazole (PRILOSEC) 40 MG capsule Take 1 capsule (40 mg total) by mouth every morning. Open capsule and take contents by mouth     No current facility-administered medications for this visit.       REVIEW OF SYSTEMS:    GENERAL:  No weight  loss, malaise or fevers.  HEENT:  Negative for frequent or significant headaches.  NECK:  Negative for lumps, goiter, pain and significant neck swelling.  RESPIRATORY:  Negative for cough, wheezing or shortness of breath.  CARDIOVASCULAR:  Negative for chest pain, leg swelling or palpitations.  GI:  Negative for abdominal discomfort, blood in stools or black stools or change in bowel habits.  MUSCULOSKELETAL:  See HPI.  SKIN:  Negative for lesions, rash, and itching.  PSYCH:  Negative for sleep disturbance, mood disorder and recent psychosocial stressors.  HEMATOLOGY/LYMPHOLOGY:  Negative for prolonged bleeding, bruising easily or swollen nodes.  NEURO:   No history of headaches, syncope, paralysis, seizures or tremors.  All other reviewed and negative other than HPI.    OBJECTIVE:    /78 (BP Location: Right arm, Patient Position: Sitting, BP Method: Medium (Automatic))   Pulse 74   Temp 97.3 °F (36.3 °C) (Temporal)   Resp 18   Ht 4' (1.219 m)   Wt 83 kg (183 lb)   SpO2 100%   BMI 55.84 kg/m²     PHYSICAL EXAMINATION:    General appearance: Well appearing, in no acute distress, alert and oriented x3.  Psych:  Mood and affect appropriate.  Skin: Skin color, texture, turgor normal, no rashes or lesions, in both upper and lower body.  Head/face:  Normocephalic, atraumatic. No palpable lymph nodes.  Neck: pain with neck extension, or lateral flexion.  Negative Spurling bilaterally   TTP along right Trapezius, levator scapulae, and paravertebral musculature around C7/T1     Cor: RRR  Pulm: CTA  Extremities: No deformities, edema, or skin discoloration. Good capillary refill.  Musculoskeletal: Bilateral upper and lower extremity strength is normal and symmetric.  No atrophy or tone abnormalities are noted.    Right shoulder: Normal.      Left shoulder: Normal.      Cervical back: Normal range of motion. Tenderness present.      Comments: Positive Neer/Castanon on the right  Negative Burnham bilaterally    Muscles strength 5/5 throughout upper extremities   DTR+2 throughout upper extremities   Neuro: Bilateral upper and lower extremity coordination and muscle stretch reflexes are physiologic and symmetric. No loss of sensation is noted.  Gait: normal.      ASSESSMENT: 42 y.o. year old female with right sided neck pain, consistent with myofascial pain of the upper thoracic and lower cervical region of the spine.      1. Myofascial pain  Ambulatory referral/consult to Physical/Occupational Therapy   2. Chronic pain disorder     3. DDD (degenerative disc disease), cervical     4. Cervical spondylosis           PLAN:     1. Discussed with patient that her right sided neck pain is most likely from myofascial pain. Trigger point injections done in clinic today to right Trapezius and right paravertebral musculature around C7/T1 region.      2. Refill of Cyclobenzaprine  5mg TID as needed.      3. Referral to Physical Therapy for evaluation, treatment, and dry needling.      4. RTC in 6 weeks or sooner if pain worsens    5. We will consider cervical Xray , MRI and possible cervical MBB in the future.     Wilbur Gardner, DO   [employer] [facility] PGY-2    - Counseled patient regarding the importance of physical therapy and to continue to be active at home with HEP.    The above plan and management options were discussed at length with patient. Patient is in agreement with the above and verbalized understanding. It will be communicated with the referring physician via electronic record, fax, or mail.          Patient Name: Chasity Kwan  MRN: [id number]    INFORMED CONSENT: The procedure, risks, benefits and options were discussed with patient. There are no contraindications to the procedure. The patient expressed understanding and agreed to proceed. The personnel performing the procedure was discussed. I verify that I personally obtained Chasity's consent prior to the start of the procedure and the signed consent can be  found on the patient's chart.    Procedure Date: 07/07/2022    Anesthesia: Topical    Pre Procedure diagnosis:   1. Myofascial pain        Post-Procedure diagnosis: same      Sedation: None    PROCEDURE: TRIGGER POINT INJECTION  The patient was placed in a seated position and time out was perfomed. The site of pain and procedure were confirmed with the patient prior to starting the procedure. After performing time out. The patient's  trigger points were identified and marked at right Trapezius and right semispinalis. The skin was prepped with chlorhexidine three times.  After performing time out A 27-gauge 1.5 inch  needle was advanced through the skin and subcutaneous tissues.  Aspiration for blood, air and CSF was negative.  A total of 4 ml of Bupivacaine 0.5% and 10 mg Decadron was injected at all trigger point.  No complications were evident. No specimens collected.    Blood Loss: Nill  Specimen: None    Wilbur Gardner, DO     I have reviewed and concur with the resident's history, physical, assessment, and plan.  I have personally interviewed and examined the patient at bedside.  See below addendum for my evaluation and additional findings.    Marcos Orourke MD

## 2022-07-18 ENCOUNTER — PATIENT MESSAGE (OUTPATIENT)
Dept: OBSTETRICS AND GYNECOLOGY | Facility: CLINIC | Age: 42
End: 2022-07-18
Payer: COMMERCIAL

## 2022-07-22 ENCOUNTER — LAB VISIT (OUTPATIENT)
Dept: LAB | Facility: HOSPITAL | Age: 42
End: 2022-07-22
Attending: INTERNAL MEDICINE
Payer: COMMERCIAL

## 2022-07-22 ENCOUNTER — PATIENT MESSAGE (OUTPATIENT)
Dept: INTERNAL MEDICINE | Facility: CLINIC | Age: 42
End: 2022-07-22

## 2022-07-22 ENCOUNTER — OFFICE VISIT (OUTPATIENT)
Dept: INTERNAL MEDICINE | Facility: CLINIC | Age: 42
End: 2022-07-22
Payer: COMMERCIAL

## 2022-07-22 VITALS
SYSTOLIC BLOOD PRESSURE: 120 MMHG | OXYGEN SATURATION: 98 % | HEIGHT: 59 IN | DIASTOLIC BLOOD PRESSURE: 80 MMHG | HEART RATE: 80 BPM | BODY MASS INDEX: 36.89 KG/M2 | WEIGHT: 183 LBS

## 2022-07-22 DIAGNOSIS — Z90.3 HISTORY OF LAPAROSCOPIC PARTIAL GASTRECTOMY: ICD-10-CM

## 2022-07-22 DIAGNOSIS — I10 ESSENTIAL HYPERTENSION: ICD-10-CM

## 2022-07-22 DIAGNOSIS — Z00.00 ANNUAL PHYSICAL EXAM: Primary | ICD-10-CM

## 2022-07-22 DIAGNOSIS — Z12.31 ENCOUNTER FOR SCREENING MAMMOGRAM FOR MALIGNANT NEOPLASM OF BREAST: ICD-10-CM

## 2022-07-22 DIAGNOSIS — I10 ESSENTIAL HYPERTENSION, BENIGN: ICD-10-CM

## 2022-07-22 DIAGNOSIS — Z80.0 FAMILY HISTORY OF COLON CANCER IN FATHER: Primary | ICD-10-CM

## 2022-07-22 DIAGNOSIS — E78.5 HYPERLIPIDEMIA, UNSPECIFIED HYPERLIPIDEMIA TYPE: ICD-10-CM

## 2022-07-22 DIAGNOSIS — M53.3 SACROILIAC PAIN: ICD-10-CM

## 2022-07-22 DIAGNOSIS — Z00.00 ANNUAL PHYSICAL EXAM: ICD-10-CM

## 2022-07-22 LAB
25(OH)D3+25(OH)D2 SERPL-MCNC: 58 NG/ML (ref 30–96)
ALBUMIN SERPL BCP-MCNC: 3.4 G/DL (ref 3.5–5.2)
ALP SERPL-CCNC: 54 U/L (ref 55–135)
ALT SERPL W/O P-5'-P-CCNC: 14 U/L (ref 10–44)
ANION GAP SERPL CALC-SCNC: 7 MMOL/L (ref 8–16)
AST SERPL-CCNC: 14 U/L (ref 10–40)
BASOPHILS # BLD AUTO: 0.02 K/UL (ref 0–0.2)
BASOPHILS NFR BLD: 0.3 % (ref 0–1.9)
BILIRUB SERPL-MCNC: 0.3 MG/DL (ref 0.1–1)
BUN SERPL-MCNC: 15 MG/DL (ref 6–20)
CALCIUM SERPL-MCNC: 9.1 MG/DL (ref 8.7–10.5)
CHLORIDE SERPL-SCNC: 107 MMOL/L (ref 95–110)
CHOLEST SERPL-MCNC: 245 MG/DL (ref 120–199)
CHOLEST/HDLC SERPL: 2.2 {RATIO} (ref 2–5)
CO2 SERPL-SCNC: 28 MMOL/L (ref 23–29)
CREAT SERPL-MCNC: 0.8 MG/DL (ref 0.5–1.4)
DIFFERENTIAL METHOD: ABNORMAL
EOSINOPHIL # BLD AUTO: 0.1 K/UL (ref 0–0.5)
EOSINOPHIL NFR BLD: 1.4 % (ref 0–8)
ERYTHROCYTE [DISTWIDTH] IN BLOOD BY AUTOMATED COUNT: 13.7 % (ref 11.5–14.5)
EST. GFR  (AFRICAN AMERICAN): >60 ML/MIN/1.73 M^2
EST. GFR  (NON AFRICAN AMERICAN): >60 ML/MIN/1.73 M^2
ESTIMATED AVG GLUCOSE: 108 MG/DL (ref 68–131)
FERRITIN SERPL-MCNC: 49 NG/ML (ref 20–300)
FOLATE SERPL-MCNC: 14 NG/ML (ref 4–24)
GLUCOSE SERPL-MCNC: 96 MG/DL (ref 70–110)
HBA1C MFR BLD: 5.4 % (ref 4–5.6)
HCT VFR BLD AUTO: 37.3 % (ref 37–48.5)
HDLC SERPL-MCNC: 109 MG/DL (ref 40–75)
HDLC SERPL: 44.5 % (ref 20–50)
HGB BLD-MCNC: 12.2 G/DL (ref 12–16)
IMM GRANULOCYTES # BLD AUTO: 0.02 K/UL (ref 0–0.04)
IMM GRANULOCYTES NFR BLD AUTO: 0.3 % (ref 0–0.5)
LDLC SERPL CALC-MCNC: 122.2 MG/DL (ref 63–159)
LYMPHOCYTES # BLD AUTO: 1.2 K/UL (ref 1–4.8)
LYMPHOCYTES NFR BLD: 17.2 % (ref 18–48)
MCH RBC QN AUTO: 29 PG (ref 27–31)
MCHC RBC AUTO-ENTMCNC: 32.7 G/DL (ref 32–36)
MCV RBC AUTO: 89 FL (ref 82–98)
MONOCYTES # BLD AUTO: 0.5 K/UL (ref 0.3–1)
MONOCYTES NFR BLD: 6.7 % (ref 4–15)
NEUTROPHILS # BLD AUTO: 5.2 K/UL (ref 1.8–7.7)
NEUTROPHILS NFR BLD: 74.1 % (ref 38–73)
NONHDLC SERPL-MCNC: 136 MG/DL
NRBC BLD-RTO: 0 /100 WBC
PLATELET # BLD AUTO: 307 K/UL (ref 150–450)
PMV BLD AUTO: 11.9 FL (ref 9.2–12.9)
POTASSIUM SERPL-SCNC: 4.2 MMOL/L (ref 3.5–5.1)
PROT SERPL-MCNC: 6.8 G/DL (ref 6–8.4)
RBC # BLD AUTO: 4.21 M/UL (ref 4–5.4)
SODIUM SERPL-SCNC: 142 MMOL/L (ref 136–145)
TRIGL SERPL-MCNC: 69 MG/DL (ref 30–150)
VIT B12 SERPL-MCNC: 641 PG/ML (ref 210–950)
WBC # BLD AUTO: 6.97 K/UL (ref 3.9–12.7)

## 2022-07-22 PROCEDURE — 99396 PREV VISIT EST AGE 40-64: CPT | Mod: S$GLB,,, | Performed by: INTERNAL MEDICINE

## 2022-07-22 PROCEDURE — 3008F PR BODY MASS INDEX (BMI) DOCUMENTED: ICD-10-PCS | Mod: CPTII,S$GLB,, | Performed by: INTERNAL MEDICINE

## 2022-07-22 PROCEDURE — 82306 VITAMIN D 25 HYDROXY: CPT | Performed by: INTERNAL MEDICINE

## 2022-07-22 PROCEDURE — 1160F RVW MEDS BY RX/DR IN RCRD: CPT | Mod: CPTII,S$GLB,, | Performed by: INTERNAL MEDICINE

## 2022-07-22 PROCEDURE — 3074F PR MOST RECENT SYSTOLIC BLOOD PRESSURE < 130 MM HG: ICD-10-PCS | Mod: CPTII,S$GLB,, | Performed by: INTERNAL MEDICINE

## 2022-07-22 PROCEDURE — 3074F SYST BP LT 130 MM HG: CPT | Mod: CPTII,S$GLB,, | Performed by: INTERNAL MEDICINE

## 2022-07-22 PROCEDURE — 1159F MED LIST DOCD IN RCRD: CPT | Mod: CPTII,S$GLB,, | Performed by: INTERNAL MEDICINE

## 2022-07-22 PROCEDURE — 1160F PR REVIEW ALL MEDS BY PRESCRIBER/CLIN PHARMACIST DOCUMENTED: ICD-10-PCS | Mod: CPTII,S$GLB,, | Performed by: INTERNAL MEDICINE

## 2022-07-22 PROCEDURE — 99999 PR PBB SHADOW E&M-EST. PATIENT-LVL IV: CPT | Mod: PBBFAC,,, | Performed by: INTERNAL MEDICINE

## 2022-07-22 PROCEDURE — 82728 ASSAY OF FERRITIN: CPT | Performed by: INTERNAL MEDICINE

## 2022-07-22 PROCEDURE — 80061 LIPID PANEL: CPT | Performed by: INTERNAL MEDICINE

## 2022-07-22 PROCEDURE — 83036 HEMOGLOBIN GLYCOSYLATED A1C: CPT | Performed by: INTERNAL MEDICINE

## 2022-07-22 PROCEDURE — 3008F BODY MASS INDEX DOCD: CPT | Mod: CPTII,S$GLB,, | Performed by: INTERNAL MEDICINE

## 2022-07-22 PROCEDURE — 84425 ASSAY OF VITAMIN B-1: CPT | Performed by: INTERNAL MEDICINE

## 2022-07-22 PROCEDURE — 3079F PR MOST RECENT DIASTOLIC BLOOD PRESSURE 80-89 MM HG: ICD-10-PCS | Mod: CPTII,S$GLB,, | Performed by: INTERNAL MEDICINE

## 2022-07-22 PROCEDURE — 3079F DIAST BP 80-89 MM HG: CPT | Mod: CPTII,S$GLB,, | Performed by: INTERNAL MEDICINE

## 2022-07-22 PROCEDURE — 82746 ASSAY OF FOLIC ACID SERUM: CPT | Performed by: INTERNAL MEDICINE

## 2022-07-22 PROCEDURE — 4010F ACE/ARB THERAPY RXD/TAKEN: CPT | Mod: CPTII,S$GLB,, | Performed by: INTERNAL MEDICINE

## 2022-07-22 PROCEDURE — 99999 PR PBB SHADOW E&M-EST. PATIENT-LVL IV: ICD-10-PCS | Mod: PBBFAC,,, | Performed by: INTERNAL MEDICINE

## 2022-07-22 PROCEDURE — 85025 COMPLETE CBC W/AUTO DIFF WBC: CPT | Performed by: INTERNAL MEDICINE

## 2022-07-22 PROCEDURE — 4010F PR ACE/ARB THEARPY RXD/TAKEN: ICD-10-PCS | Mod: CPTII,S$GLB,, | Performed by: INTERNAL MEDICINE

## 2022-07-22 PROCEDURE — 84630 ASSAY OF ZINC: CPT | Performed by: INTERNAL MEDICINE

## 2022-07-22 PROCEDURE — 1159F PR MEDICATION LIST DOCUMENTED IN MEDICAL RECORD: ICD-10-PCS | Mod: CPTII,S$GLB,, | Performed by: INTERNAL MEDICINE

## 2022-07-22 PROCEDURE — 80053 COMPREHEN METABOLIC PANEL: CPT | Performed by: INTERNAL MEDICINE

## 2022-07-22 PROCEDURE — 36415 COLL VENOUS BLD VENIPUNCTURE: CPT | Performed by: INTERNAL MEDICINE

## 2022-07-22 PROCEDURE — 82607 VITAMIN B-12: CPT | Performed by: INTERNAL MEDICINE

## 2022-07-22 PROCEDURE — 99396 PR PREVENTIVE VISIT,EST,40-64: ICD-10-PCS | Mod: S$GLB,,, | Performed by: INTERNAL MEDICINE

## 2022-07-22 RX ORDER — LOSARTAN POTASSIUM 25 MG/1
25 TABLET ORAL DAILY
Qty: 90 TABLET | Refills: 3 | Status: SHIPPED | OUTPATIENT
Start: 2022-07-22 | End: 2022-12-26

## 2022-07-22 RX ORDER — ROSUVASTATIN CALCIUM 10 MG/1
10 TABLET, COATED ORAL DAILY
Qty: 90 TABLET | Refills: 3 | Status: SHIPPED | OUTPATIENT
Start: 2022-07-22 | End: 2022-07-26 | Stop reason: SDUPTHER

## 2022-07-22 NOTE — PROGRESS NOTES
"    CHIEF COMPLAINT     Chief Complaint   Patient presents with    Follow-up       HPI     Chasity Di Kwan is a 42 y.o. female obesity status post laparoscopic partial gastrectomy, hypertension, hyperlipidemia, SI joint pain and anxiety here today for annual exam     Since last visit,  Seen by sleep- still working on getting CPAP Device.    Overdue for bariatric surgery labs    Seen in pain clinic for myofascial neck and back pain.  Plan is to go to physical therapy for dry needling.    Personally Reviewed Patient's Medical, surgical, family and social hx. Changes updated in [facility].  Care Team updated in Epic    Review of Systems:  Review of Systems   Constitutional: Positive for activity change. Negative for unexpected weight change.   HENT: Negative for hearing loss, rhinorrhea and trouble swallowing.    Eyes: Negative for discharge and visual disturbance.   Respiratory: Negative for chest tightness and wheezing.    Cardiovascular: Negative for chest pain and palpitations.   Gastrointestinal: Negative for blood in stool, constipation, diarrhea and vomiting.   Endocrine: Negative for polydipsia and polyuria.   Genitourinary: Negative for difficulty urinating, dysuria, hematuria and menstrual problem.   Musculoskeletal: Positive for neck pain. Negative for arthralgias and joint swelling.   Neurological: Positive for headaches. Negative for weakness.   Psychiatric/Behavioral: Negative for confusion and dysphoric mood.       Health Maintenance:   Reviewed with patient  Due for the following:      PHYSICAL EXAM     /80   Pulse 80   Ht 4' 11" (1.499 m)   Wt 83 kg (183 lb)   SpO2 98%   BMI 36.96 kg/m²     Gen: Well Appearing, NAD  HEENT: PERR, EOMI  Neck: FROM, no thyromegaly, no cervical adenopathy  CVD: RRR, no M/R/G  Pulm: Normal work of breathing, CTAB, no wheezing  Abd:  Soft, NT, ND non TTP, no mass  MSK: no LE edema  Neuro: A&Ox3, gait normal, speech normal  Mood; Mood normal, behavior normal, " thought process linear       LABS     Labs reviewed; Notable for    ASSESSMENT     1. Annual physical exam  CBC auto differential    Lipid panel    Comprehensive Metabolic Panel    Hemoglobin A1C   2. History of laparoscopic partial gastrectomy  Ferritin    Vitamin B12    Vitamin D    Zinc    Vitamin B1    Folate   3. Hyperlipidemia, unspecified hyperlipidemia type  rosuvastatin (CRESTOR) 10 MG tablet   4. Essential hypertension, benign     5. Sacroiliac pain     6. Encounter for screening mammogram for malignant neoplasm of breast  Mammo Digital Screening Bilat   7. Essential hypertension  losartan (COZAAR) 25 MG tablet           Plan     Chasity Kwan is a 42 y.o. female w/ obesity status post laparoscopic partial gastrectomy, hypertension, hyperlipidemia, SI joint pain and anxiety here today for annual exam     1. Annual physical exam  Updated problem list, medical history, care team and discussed HM.     - CBC auto differential; Future  - Lipid panel; Future  - Comprehensive Metabolic Panel; Future  - Hemoglobin A1C; Future    2. History of laparoscopic partial gastrectomy  Will get post bariatric surgery labs  Avoid NSAIDS  Continue PPI  - Ferritin; Future  - Vitamin B12; Future  - Vitamin D; Future  - Zinc; Future  - Vitamin B1; Future  - Folate; Future    3. Hyperlipidemia, unspecified hyperlipidemia type  Continue rosuvastatin recheck lipids  - rosuvastatin (CRESTOR) 10 MG tablet; Take 1 tablet (10 mg total) by mouth once daily.  Dispense: 90 tablet; Refill: 3    5. Sacroiliac pain  Continue multimodal treatment plan will follow-up after physical therapy    6. Encounter for screening mammogram for malignant neoplasm of breast  - Mammo Digital Screening Bilat; Future    7. Essential hypertension  At goal continue losartan 25  - losartan (COZAAR) 25 MG tablet; Take 1 tablet (25 mg total) by mouth once daily.  Dispense: 90 tablet; Refill: 3      Odilon Alas MD

## 2022-07-25 LAB — ZINC SERPL-MCNC: 77 UG/DL (ref 60–130)

## 2022-07-26 DIAGNOSIS — E78.5 HYPERLIPIDEMIA, UNSPECIFIED HYPERLIPIDEMIA TYPE: ICD-10-CM

## 2022-07-26 LAB — VIT B1 BLD-MCNC: 56 UG/L (ref 38–122)

## 2022-07-26 RX ORDER — ROSUVASTATIN CALCIUM 20 MG/1
20 TABLET, COATED ORAL DAILY
Qty: 90 TABLET | Refills: 3 | Status: SHIPPED | OUTPATIENT
Start: 2022-07-26 | End: 2022-09-15 | Stop reason: SDUPTHER

## 2022-08-08 ENCOUNTER — TELEPHONE (OUTPATIENT)
Dept: ENDOSCOPY | Facility: HOSPITAL | Age: 42
End: 2022-08-08
Payer: COMMERCIAL

## 2022-08-08 NOTE — TELEPHONE ENCOUNTER
Spoke to Chasity Kwan states would rather have her colonoscopy closer to home, number given for [facility]

## 2022-08-16 ENCOUNTER — CLINICAL SUPPORT (OUTPATIENT)
Dept: REHABILITATION | Facility: OTHER | Age: 42
End: 2022-08-16
Attending: ANESTHESIOLOGY
Payer: COMMERCIAL

## 2022-08-16 ENCOUNTER — TELEPHONE (OUTPATIENT)
Dept: SPINE | Facility: CLINIC | Age: 42
End: 2022-08-16
Payer: COMMERCIAL

## 2022-08-16 DIAGNOSIS — R68.89 DECREASED FUNCTIONAL ACTIVITY TOLERANCE: ICD-10-CM

## 2022-08-16 DIAGNOSIS — M79.18 MYOFASCIAL PAIN: ICD-10-CM

## 2022-08-16 DIAGNOSIS — G89.29 CHRONIC RIGHT SHOULDER PAIN: ICD-10-CM

## 2022-08-16 DIAGNOSIS — M25.511 CHRONIC RIGHT SHOULDER PAIN: ICD-10-CM

## 2022-08-16 PROCEDURE — 97110 THERAPEUTIC EXERCISES: CPT | Mod: 97

## 2022-08-16 PROCEDURE — 97161 PT EVAL LOW COMPLEX 20 MIN: CPT

## 2022-08-16 NOTE — TELEPHONE ENCOUNTER
This message is for patient in regards to his/her appointment 08/18/22 at 8:30 a  With Dr. Orourke,patient informed staff that she needed to cancel her appointment and she stated she will call the office and do a reschedule.

## 2022-08-16 NOTE — PLAN OF CARE
"  [facility]  Physical Therapy Initial Evaluation    Date: 8/16/2022     Name: Chasity Kwan  Clinic Number: [id number]    Therapy Diagnosis:   Encounter Diagnoses   Name Primary?    Myofascial pain     Chronic right shoulder pain     Decreased functional activity tolerance      Physician: Marcos Orourke MD    Physician Orders: PT Eval and Treat - Dry needling   Medical Diagnosis from Referral: Myofascial pain [M79.18]  Evaluation Date: 8/16/2022  Authorization Period Expiration: 07/07/2023  Plan of Care Expiration: 10/25/2022  Visit # / Visits authorized: 1/ 1   Progress Note Due: 09/16/2023  FOTO: 1/ 1    Precautions: Standard, tear in right RTC tear     Time In: 8:00 am  Time Out: 9:00 am  Total Appointment Time (timed & untimed codes): 60 minutes    Subjective   Date of onset: "several years"   History of current condition - JEANNE reports: Pt reports a long standing history of neck pain. She reports that her pain was recently exacerbated 3 weeks; denies any inciting event or trauma. She reports that she did get a shoulder MRI about a year that did show a tear in her right rotator cuff. However she reports that this pain is different from her shoulder pain she had a year ago stating that it more from the neck.      Describes the pain as sharp/achy/ and like a muscle pull in nature. She states that she is sure she has several triggers points in her neck muscles and the MD recommend her to get dry needling. Pain is exacerbated by overhead movement and activity. Pain is relieved by heat, Acupuncture, Cyclobenzaprine.    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    Medications:   Chasity has a current medication list which includes the following prescription(s): clonazepam, drospirenone-ethinyl estradiol, escitalopram oxalate, losartan, multivitamin, omeprazole, propranolol, rosuvastatin, valacyclovir, and zolpidem.    Allergies:   Review of patient's allergies indicates:  No Known Allergies       Objective   Observation: Pt ambulates into clinic with independence and no AD.     Posture Alignment: slouched posture;forward head    Sensation: Light touch: Intact    DTR: 2+    GAIT DEVIATIONS: Chasity displays no significant gait deviations     Cervical Range of Motion:    Degrees Pain   Flexion 33 +     Extension 44 -   Right Side Bending 35 -     Left Side Bending 36 +   Right Rotation 60 -   Left Rotation 44 +      Shoulder Range of Motion:   Shoulder Left Right   Flexion *   Abduction *   ER WFL 33*   IR WFL 41*     Strength:  Cervical MMT   Flexion 4-/5   Extension 4/5   Right Side Bend 4/5   Left Side Bend 4/5     Upper Extremity Strength  (R) UE  (L) UE    Shoulder elevation: 4-/5 Shoulder elevation: 4-/5   Shoulder flexion: 4-/5 Shoulder flexion: 4-/5   Shoulder Abduction: 3+/5 Shoulder abduction: 4-/5   Shoulder ER 3+/5 Shoulder ER 4-/5   Shoulder IR 3+/5 Shoulder IR 4-/5   Elbow flexion: 4+/5 Elbow flexion: " 4-/5   Elbow extension: 4+/5 Elbow extension: 4+/5   Wrist flexion: 4+/5 Wrist flexion: 4+/5   Wrist extension: 4+/5 Wrist extension: 4+/5   Lower Trap 3/5 Lower Trap 3+/5   Middle Trap 3/5 Middle Trap 3+/5   Rhomboids 3/5 Rhomboids 3+/5       Special Tests:  Distraction -   Compression -   Spurlings -   DNF test 5 seconds     Upper Limb Neurodynamic testing:   Right Left   UNT - -   MNT - -   RNT - -       Joint Mobility: hypomobile thoracic and cervical segments     Palpation TTP to bilateral strength     Flexibility: decreased soft tissue flexibility     PT Evaluation Completed? Yes  Discussed Plan of Care with patient: Yes    CMS Impairment/Limitation/Restriction for FOTO Survey    Therapist reviewed FOTO scores for Chasity Kwan on 8/16/2022.   FOTO documents entered into [employer] - see Media section.    Limitation Score: 39%         TREATMENT   Treatment Time In: 8:30 am  Treatment Time Out: 9:00 am  Total Treatment time separate from Evaluation: 30 minutes    JEANNE received therapeutic exercises to develop strength, endurance, ROM and flexibility for 30 minutes including:  +UT stretch  +LS stretch  +Supine chin tuck  +Cervical extension SNAG  +Cervical rotation SNAG    Home Exercises and Patient Education Provided    Education provided:   -HEP, POC  -Patient was educated on initial evaluation findings and expectations as well as future PT services, procedures, and expectations for optimal compliance with therapy    Written Home Exercises Provided: yes.  Exercises were reviewed and JEANNE was able to demonstrate them prior to the end of the session.  JEANNE demonstrated good  understanding of the education provided.     See EMR under Patient Instructions for exercises provided 8/16/2022.    Assessment   Chasity is a 42 y.o. female referred to outpatient Physical Therapy with a medical diagnosis of Myofascial pain [M79.18].  Pt presents with signs and symptoms consistent with diagnosis including weakness of  RUE and cervical musculature, decreased joint mobility in cervical and thoracic spine, decreased soft tissue flexibility, TTP to right shoulder complex, poor posture and decreased functional mobility tolerance. These deficits are limiting patient in full participation in ADL's and leisure activities such as lifting, grocery shopping, and repetitive task at work. Pt will benefit from skilled outpatient Physical Therapy to address the deficits stated above and in the chart below, provide pt/family education, and to maximize pt's level of independence.      Pt prognosis is Good.   Pt will benefit from skilled outpatient Physical Therapy to address the deficits stated above and in the chart below, provide pt/family education, and to maximize pt's level of independence.     Plan of care discussed with patient: Yes  Pt's spiritual, cultural and educational needs considered and patient is agreeable to the plan of care and goals as stated below:     Anticipated Barriers for therapy: chronicity of condition     Medical Necessity is demonstrated by the following  History  Co-morbidities and personal factors that may impact the plan of care Co-morbidities:   chronicity of condition     Personal Factors:   no deficits     low   Examination  Body Structures and Functions, activity limitations and participation restrictions that may impact the plan of care Body Regions:   neck  upper extremities    Body Systems:    gross symmetry  ROM  strength  gross coordinated movement  transfers  transitions  motor control  motor learning    Participation Restrictions:   independent with all ADL's with increased pain after sitting at computer desk     Activity limitations:   Learning and applying knowledge  no deficits    General Tasks and Commands  no deficits    Communication  no deficits    Mobility  no deficits    Self care  no deficits    Domestic Life  no deficits    Interactions/Relationships  no deficits    Life Areas  no  deficits    Community and Social Life  no deficits         Complexity: low   Clinical Presentation stable and uncomplicated low   Decision Making/ Complexity Score: low       GOALS: Short Term Goals: 4 weeks  1.Report decreased in pain at worse less than  <   / =  8 /10  to increase tolerance for functional activities. On going  2. Pt to improve range of motion cervical by 25% to allow for improved functional mobility to allow for improvement in IADLs.  On going  3. Increased  Cervical MMT 1/2  grade to increase tolerance for ADL and work activities. On going  4. Pt to tolerate HEP to improve ROM and independence with ADL's. On going  5. Increased MMT  for lower traps/middle traps/rhomboids to > or = 3+/5 to increase tolerance for ADL and improve posture. On going    Long Term Goals: 8 weeks  1.Report decreased in pain at worse less than  <   / =  1  /10  to increase tolerance for functional activities. On going  2.Pt to improve range of motion by 75% to allow for improved functional mobility to allow for improvement in IADLs. On going  3.Increased cervical MMT 1 grade  to increase tolerance for ADL and work activities.On going  4.  Pt will report 33% on FOTO neck survey score for neck pain disability to demonstrate decrease in disability and improvement in neck pain.On going  5. Pt to be Independent with HEP to improve ROM and independence with ADL's. On going  6. Increased MMT  for lower traps/middle traps/rhomboids to > or = 4+/5 to increase tolerance for ADL and improve posture. On going    Plan   Plan of care Certification: 8/16/2022 to 10/25/2022    Outpatient Physical Therapy 2 times weekly for 10 weeks to include the following interventions: Cervical/Lumbar Traction, Electrical Stimulation PRN, Gait Training, Iontophoresis (with PRN), Manual Therapy, Moist Heat/ Ice, Neuromuscular Re-ed, Patient Education, Self Care, Therapeutic Activities and Therapeutic Exercise. Dry needling Progress HEP towards D/C.  Recommend F/U with MD if symptoms worsen or do not resolve. Patient may be seen by a PTA for treatment to carry out their plan of care.  Face-to-face conferences will be held.    Maximilian Salgado PT      I CERTIFY THE NEED FOR THESE SERVICES FURNISHED UNDER THIS PLAN OF TREATMENT AND WHILE UNDER MY CARE   Physician's comments:     Physician's Signature: ___________________________________________________

## 2022-08-17 ENCOUNTER — OFFICE VISIT (OUTPATIENT)
Dept: NEUROLOGY | Facility: CLINIC | Age: 42
End: 2022-08-17
Payer: COMMERCIAL

## 2022-08-17 DIAGNOSIS — G43.809 OTHER MIGRAINE WITHOUT STATUS MIGRAINOSUS, NOT INTRACTABLE: Primary | ICD-10-CM

## 2022-08-17 PROCEDURE — 99213 PR OFFICE/OUTPT VISIT, EST, LEVL III, 20-29 MIN: ICD-10-PCS | Mod: 95,,, | Performed by: PSYCHIATRY & NEUROLOGY

## 2022-08-17 PROCEDURE — 3044F PR MOST RECENT HEMOGLOBIN A1C LEVEL <7.0%: ICD-10-PCS | Mod: CPTII,95,, | Performed by: PSYCHIATRY & NEUROLOGY

## 2022-08-17 PROCEDURE — 99213 OFFICE O/P EST LOW 20 MIN: CPT | Mod: 95,,, | Performed by: PSYCHIATRY & NEUROLOGY

## 2022-08-17 PROCEDURE — 3044F HG A1C LEVEL LT 7.0%: CPT | Mod: CPTII,95,, | Performed by: PSYCHIATRY & NEUROLOGY

## 2022-08-17 PROCEDURE — 4010F PR ACE/ARB THEARPY RXD/TAKEN: ICD-10-PCS | Mod: CPTII,95,, | Performed by: PSYCHIATRY & NEUROLOGY

## 2022-08-17 PROCEDURE — 4010F ACE/ARB THERAPY RXD/TAKEN: CPT | Mod: CPTII,95,, | Performed by: PSYCHIATRY & NEUROLOGY

## 2022-08-17 RX ORDER — SUMATRIPTAN 50 MG/1
TABLET, FILM COATED ORAL
Qty: 12 TABLET | Refills: 3 | Status: SHIPPED | OUTPATIENT
Start: 2022-08-17 | End: 2023-08-06 | Stop reason: SDUPTHER

## 2022-09-02 ENCOUNTER — CLINICAL SUPPORT (OUTPATIENT)
Dept: REHABILITATION | Facility: OTHER | Age: 42
End: 2022-09-02
Payer: COMMERCIAL

## 2022-09-02 DIAGNOSIS — R68.89 DECREASED FUNCTIONAL ACTIVITY TOLERANCE: ICD-10-CM

## 2022-09-02 DIAGNOSIS — M25.511 CHRONIC RIGHT SHOULDER PAIN: Primary | ICD-10-CM

## 2022-09-02 DIAGNOSIS — G89.29 CHRONIC RIGHT SHOULDER PAIN: Primary | ICD-10-CM

## 2022-09-02 PROCEDURE — 97110 THERAPEUTIC EXERCISES: CPT | Mod: 97

## 2022-09-02 PROCEDURE — 97140 MANUAL THERAPY 1/> REGIONS: CPT | Mod: 97

## 2022-09-06 NOTE — PROGRESS NOTES
"[facility]   Physical Therapy Treatment Note     Name: Chasity Kwan  Clinic Number: [id number]    Therapy Diagnosis: No diagnosis found.  Physician: Marcos Orourke MD    Visit Date: 9/2/2022    Physician Orders: PT Eval and Treat - Dry needling   Medical Diagnosis from Referral: Myofascial pain [M79.18]  Evaluation Date: 8/16/2022  Authorization Period Expiration: 07/07/2023  Plan of Care Expiration: 10/25/2022  Visit # / Visits authorized: 1/ 1     Progress Note Due: 09/16/2023  FOTO: 1/ 1  PTA Visit #: 0/5     Precautions: Standard, R RTC tear    Time In: 1502  Time Out: 1600  Total Billable Time: 55 minutes      SUBJECTIVE     Pt reports: that her pain remains consistent since initial eval. Reports a trigger point in R upper trap.  She was compliant with home exercise program.  Response to previous treatment: none  Functional change: none    Pain: 5/10  Location:  right upper trap     OBJECTIVE     Objective Measures updated at progress report unless specified.       TREATMENT     JEANNE received the treatments listed below:      received therapeutic exercises to develop strength and ROM for 30  minutes including:    B Upper trap stretch 3x30"  B LS stretch 3x30"   Supine chin tucks 2x10x3"  Cervical extension SNAG 2x10x3"  Cervical rotation SNAG 2x10x3"  Scap retractions 2x10x3"    received the following manual therapy techniques: Soft tissue Mobilization were applied to the: - for - minutes, including:      Pt cleared of contraindications and verbal and written consent acquired. Pt given option of copy of consent form. Pt educated on benefits and potential side effects of DN. DN for 25 minutes with pt in prone position. Needles inserted into B upper traps and levator insertions for 10 minutes with no adverse results.    Patient provided written and verbal consent to receive functional dry needling at today's visit (see consent form scanned into chart). FDN performed to " reduce pain and muscle tension, promote blood flow, and improve ROM and function. Pt tolerated tx well without adverse effects. She was educated on what to expect following the procedure and she verbalized understanding.      PATIENT EDUCATION AND HOME EXERCISES     Home Exercises Provided and Patient Education Provided     Education provided:   - PT role and POC  - HEP  - FDN informed constent     Written Home Exercises Provided: Patient instructed to cont prior HEP. Exercises were reviewed and JEANNE was able to demonstrate them prior to the end of the session.  JEANNE demonstrated good  understanding of the education provided. See EMR under Patient Instructions for exercises provided during therapy sessions    ASSESSMENT     Jeanne tolerated treatment well today. Reports some tightness and pain to R upper trap that is easily palpable. Reports good compliance with HEP. She feels like it is helping a little. Reviewed exercises and added scap retractions to help correct posture. Discussed proper ergonomics and encouraged patient to bring a picture of workspace. Tolerated FDN well and reported relief afterwards. Will continue to monitor and progress as able.     JEANNE Is progressing well towards her goals.   Pt prognosis is Good.     Pt will continue to benefit from skilled outpatient physical therapy to address the deficits listed in the problem list box on initial evaluation, provide pt/family education and to maximize pt's level of independence in the home and community environment.     Pt's spiritual, cultural and educational needs considered and pt agreeable to plan of care and goals.     Anticipated barriers to physical therapy: none    Goals:   GOALS: Short Term Goals: 4 weeks  1.Report decreased in pain at worse less than  <   / =  8 /10  to increase tolerance for functional activities. On going  2. Pt to improve range of motion cervical by 25% to allow for improved functional mobility to allow for improvement in  IADLs.  On going  3. Increased  Cervical MMT 1/2  grade to increase tolerance for ADL and work activities. On going  4. Pt to tolerate HEP to improve ROM and independence with ADL's. On going  5. Increased MMT  for lower traps/middle traps/rhomboids to > or = 3+/5 to increase tolerance for ADL and improve posture. On going     Long Term Goals: 8 weeks  1.Report decreased in pain at worse less than  <   / =  1  /10  to increase tolerance for functional activities. On going  2.Pt to improve range of motion by 75% to allow for improved functional mobility to allow for improvement in IADLs. On going  3.Increased cervical MMT 1 grade  to increase tolerance for ADL and work activities.On going  4.  Pt will report 33% on FOTO neck survey score for neck pain disability to demonstrate decrease in disability and improvement in neck pain.On going  5. Pt to be Independent with HEP to improve ROM and independence with ADL's. On going  6. Increased MMT  for lower traps/middle traps/rhomboids to > or = 4+/5 to increase tolerance for ADL and improve posture. On going    PLAN     Plan of care Certification: 8/16/2022 to 10/25/2022     Outpatient Physical Therapy 2 times weekly for 10 weeks to include the following interventions: Cervical/Lumbar Traction, Electrical Stimulation PRN, Gait Training, Iontophoresis (with PRN), Manual Therapy, Moist Heat/ Ice, Neuromuscular Re-ed, Patient Education, Self Care, Therapeutic Activities and Therapeutic Exercise. Dry needling Progress HEP towards D/C. Recommend F/U with MD if symptoms worsen or do not resolve. Patient may be seen by a PTA for treatment to carry out their plan of care.  Face-to-face conferences will be held.    Ziggy Hill, PT

## 2022-09-09 ENCOUNTER — CLINICAL SUPPORT (OUTPATIENT)
Dept: REHABILITATION | Facility: OTHER | Age: 42
End: 2022-09-09
Attending: PHYSICAL MEDICINE & REHABILITATION
Payer: COMMERCIAL

## 2022-09-09 DIAGNOSIS — G89.29 CHRONIC RIGHT SHOULDER PAIN: Primary | ICD-10-CM

## 2022-09-09 DIAGNOSIS — R68.89 DECREASED FUNCTIONAL ACTIVITY TOLERANCE: ICD-10-CM

## 2022-09-09 DIAGNOSIS — M25.511 CHRONIC RIGHT SHOULDER PAIN: Primary | ICD-10-CM

## 2022-09-09 DIAGNOSIS — Z12.11 ENCOUNTER FOR SCREENING COLONOSCOPY FOR NON-HIGH-RISK PATIENT: Primary | ICD-10-CM

## 2022-09-09 PROCEDURE — 97110 THERAPEUTIC EXERCISES: CPT | Mod: CQ

## 2022-09-09 PROCEDURE — 97140 MANUAL THERAPY 1/> REGIONS: CPT | Mod: CQ,97

## 2022-09-09 NOTE — PROGRESS NOTES
"[facility]   Physical Therapy Treatment Note     Name: Chasity Kwan  Clinic Number: [id number]    Therapy Diagnosis:   Encounter Diagnoses   Name Primary?    Chronic right shoulder pain Yes    Decreased functional activity tolerance      Physician: Marcos Orourke MD    Visit Date: 9/9/2022    Physician Orders: PT Eval and Treat - Dry needling   Medical Diagnosis from Referral: Myofascial pain [M79.18]  Evaluation Date: 8/16/2022  Authorization Period Expiration: 07/07/2023  Plan of Care Expiration: 10/25/2022  Visit # / Visits authorized: 2/ 12   (3 Visits total)  Progress Note Due: 09/16/2023  FOTO: 1/ 1  PTA Visit #: 1/5     Precautions: Standard, R RTC tear    Time In: 2:35  Time Out: 3:25  Total Billable Time: 45 minutes      SUBJECTIVE     Pt reports: increased R upper trap pain, she state she has had a stressful week at work that could have attributed to her increased pain.  She experienced increased soreness initially following FDN but felt relief afterwards.    She was compliant with home exercise program.  Response to previous treatment: initial increased soreness but pain relief afterwards  Functional change: none    Pain: 7/10  Location:  right upper trap     OBJECTIVE     Objective Measures updated at progress report unless specified.       TREATMENT     JEANNE received the treatments listed below:      received therapeutic exercises to develop strength and ROM for 35  minutes including:    UBE level 1 3 min forward/3 min backward    B Upper trap stretch 3x30"  B LS stretch 3x30"   Supine chin tucks 2x10x3"  Cervical extension SNAG 2x10x3"  Cervical rotation SNAG 2x10x3"  Scap retractions 2x10x3"  +Supine pec stretch on 1/2 foam roll x3 min  +horizontal abduction on 1/2 foam roll 2x10    received the following manual therapy techniques: Soft tissue Mobilization were applied to the: - for 10 minutes, including:  Sub occipital release  Manual UT stretch  STM to R UT "     Pt cleared of contraindications and verbal and written consent acquired. Pt given option of copy of consent form. Pt educated on benefits and potential side effects of DN. DN for 00 minutes with pt in prone position. Needles inserted into B upper traps and levator insertions for 10 minutes with no adverse results.    Patient provided written and verbal consent to receive functional dry needling at today's visit (see consent form scanned into chart). FDN performed to reduce pain and muscle tension, promote blood flow, and improve ROM and function. Pt tolerated tx well without adverse effects. She was educated on what to expect following the procedure and she verbalized understanding.    Jeanne received an ice pack to cervical/thoracic paraspinals for 5 minutes    PATIENT EDUCATION AND HOME EXERCISES     Home Exercises Provided and Patient Education Provided     Education provided:   - PT role and POC  - HEP  - FDN informed constent     Written Home Exercises Provided: Patient instructed to cont prior HEP. Exercises were reviewed and JEANNE was able to demonstrate them prior to the end of the session.  JEANNE demonstrated good  understanding of the education provided. See EMR under Patient Instructions for exercises provided during therapy sessions    ASSESSMENT   Jeanne returned reporting increased R Upper Trap pain.  Treatment began with sub-occipital release, manual R UT stretch, and STM to R UT. Jeanne reported decreased pain levels following manual techniques.  Pec stretch and horizontal abduction over half foam roll for postural/periscapular strengthening.  Treatment was tolerated well with no increase in pain.  Will continue to progress per pt's tolerance.    JEANNE Is progressing well towards her goals.   Pt prognosis is Good.     Pt will continue to benefit from skilled outpatient physical therapy to address the deficits listed in the problem list box on initial evaluation, provide pt/family education and to maximize pt's  level of independence in the home and community environment.     Pt's spiritual, cultural and educational needs considered and pt agreeable to plan of care and goals.     Anticipated barriers to physical therapy: none    Goals:   GOALS: Short Term Goals: 4 weeks  1.Report decreased in pain at worse less than  <   / =  8 /10  to increase tolerance for functional activities. On going  2. Pt to improve range of motion cervical by 25% to allow for improved functional mobility to allow for improvement in IADLs.  On going  3. Increased  Cervical MMT 1/2  grade to increase tolerance for ADL and work activities. On going  4. Pt to tolerate HEP to improve ROM and independence with ADL's. On going  5. Increased MMT  for lower traps/middle traps/rhomboids to > or = 3+/5 to increase tolerance for ADL and improve posture. On going     Long Term Goals: 8 weeks  1.Report decreased in pain at worse less than  <   / =  1  /10  to increase tolerance for functional activities. On going  2.Pt to improve range of motion by 75% to allow for improved functional mobility to allow for improvement in IADLs. On going  3.Increased cervical MMT 1 grade  to increase tolerance for ADL and work activities.On going  4.  Pt will report 33% on FOTO neck survey score for neck pain disability to demonstrate decrease in disability and improvement in neck pain.On going  5. Pt to be Independent with HEP to improve ROM and independence with ADL's. On going  6. Increased MMT  for lower traps/middle traps/rhomboids to > or = 4+/5 to increase tolerance for ADL and improve posture. On going    PLAN     Plan of care Certification: 8/16/2022 to 10/25/2022     Outpatient Physical Therapy 2 times weekly for 10 weeks to include the following interventions: Cervical/Lumbar Traction, Electrical Stimulation PRN, Gait Training, Iontophoresis (with PRN), Manual Therapy, Moist Heat/ Ice, Neuromuscular Re-ed, Patient Education, Self Care, Therapeutic Activities and  Therapeutic Exercise. Dry needling Progress HEP towards D/C. Recommend F/U with MD if symptoms worsen or do not resolve. Patient may be seen by a PTA for treatment to carry out their plan of care.  Face-to-face conferences will be held.    Orlando Wakefield PTA

## 2022-09-15 ENCOUNTER — PATIENT MESSAGE (OUTPATIENT)
Dept: INTERNAL MEDICINE | Facility: CLINIC | Age: 42
End: 2022-09-15
Payer: COMMERCIAL

## 2022-09-15 DIAGNOSIS — E78.5 HYPERLIPIDEMIA, UNSPECIFIED HYPERLIPIDEMIA TYPE: ICD-10-CM

## 2022-09-15 RX ORDER — ROSUVASTATIN CALCIUM 20 MG/1
20 TABLET, COATED ORAL DAILY
Qty: 90 TABLET | Refills: 3 | Status: SHIPPED | OUTPATIENT
Start: 2022-09-15 | End: 2023-06-28 | Stop reason: SDUPTHER

## 2022-09-15 NOTE — TELEPHONE ENCOUNTER
No new care gaps identified.  [facility] Embedded Care Gaps. Reference number: [id number]. 9/15/2022   2:16:29 PM CDT

## 2022-09-17 ENCOUNTER — IMMUNIZATION (OUTPATIENT)
Dept: PRIMARY CARE CLINIC | Facility: CLINIC | Age: 42
End: 2022-09-17
Payer: COMMERCIAL

## 2022-09-17 DIAGNOSIS — Z23 NEED FOR VACCINATION: Primary | ICD-10-CM

## 2022-09-17 PROCEDURE — 91313 COVID-19, MRNA, LNP-S, BIVALENT BOOSTER, PF, 50 MCG/0.5 ML: CPT | Mod: PBBFAC | Performed by: INTERNAL MEDICINE

## 2022-09-20 ENCOUNTER — PATIENT MESSAGE (OUTPATIENT)
Dept: INTERNAL MEDICINE | Facility: CLINIC | Age: 42
End: 2022-09-20
Payer: COMMERCIAL

## 2022-09-21 ENCOUNTER — TELEPHONE (OUTPATIENT)
Dept: OBSTETRICS AND GYNECOLOGY | Facility: CLINIC | Age: 42
End: 2022-09-21
Payer: COMMERCIAL

## 2022-09-21 DIAGNOSIS — R92.8 ABNORMAL MAMMOGRAM: Primary | ICD-10-CM

## 2022-09-21 NOTE — TELEPHONE ENCOUNTER
----- Message from Tsering Bronson sent at 9/20/2022  2:04 PM CDT -----  Regarding: mammogram orders  Name of Who is Calling:MARYANN PRICE [[id number]]          What is the request in detail: Pt states that she recently had a mammogram. They saw something and would like to follow up with an ultrasound. She is asking that orders be put in for this          Can the clinic reply by [facility]:no          What Number to Call Back if not in [facility]:[phone]

## 2022-09-23 ENCOUNTER — CLINICAL SUPPORT (OUTPATIENT)
Dept: REHABILITATION | Facility: OTHER | Age: 42
End: 2022-09-23
Attending: ANESTHESIOLOGY
Payer: COMMERCIAL

## 2022-09-23 DIAGNOSIS — R68.89 DECREASED FUNCTIONAL ACTIVITY TOLERANCE: ICD-10-CM

## 2022-09-23 DIAGNOSIS — M25.511 CHRONIC RIGHT SHOULDER PAIN: Primary | ICD-10-CM

## 2022-09-23 DIAGNOSIS — G89.29 CHRONIC RIGHT SHOULDER PAIN: Primary | ICD-10-CM

## 2022-09-23 PROCEDURE — 97110 THERAPEUTIC EXERCISES: CPT

## 2022-09-23 PROCEDURE — 97140 MANUAL THERAPY 1/> REGIONS: CPT

## 2022-09-23 NOTE — PROGRESS NOTES
"[facility]   Physical Therapy Treatment Note     Name: Chaisty Kwan  Clinic Number: [id number]    Therapy Diagnosis:   Encounter Diagnoses   Name Primary?    Chronic right shoulder pain Yes    Decreased functional activity tolerance      Physician: Marcos Orourke MD    Visit Date: 9/23/2022    Physician Orders: PT Eval and Treat - Dry needling   Medical Diagnosis from Referral: Myofascial pain [M79.18]  Evaluation Date: 8/16/2022  Authorization Period Expiration: 07/07/2023  Plan of Care Expiration: 10/25/2022  Visit # / Visits authorized: 3/12   (4 Visits total)  Progress Note Due: 10/23/22  FOTO: 1/ 1  PTA Visit #: 1/5     Precautions: Standard, R RTC tear    Time In: 1500  Time Out: 1550  Total Billable Time: 45 minutes      SUBJECTIVE     Pt reports: continues to have localized pain to R upper trap. She is working long hours still and spending a lot of time in front of the computer. She has gotten some swiveling monitor jose, but has not installed them yet.     She was compliant with home exercise program.  Response to previous treatment: initial increased soreness but pain relief afterwards  Functional change: none    Pain: 7.5/10  Location:  right upper trap     OBJECTIVE     Objective Measures updated at progress report unless specified.     Cervical Range of Motion 9/22/22:     Degrees Pain   Flexion 50 +      Extension 43 -   Right Side Bending 35 -      Left Side Bending 40 +   Right Rotation 60 -   Left Rotation 46 +       TREATMENT     JEANNE received the treatments listed below:      received therapeutic exercises to develop strength and ROM for 35  minutes including:      UBE level 1 3 min forward/3 min backward    B Upper trap stretch 3x30"  B LS stretch 3x30"   Supine chin tucks 2x10x3"  Cervical extension SNAG 2x10x3"  Cervical rotation SNAG 2x10x3"  Scap retractions 2x10x3"  +Standing pec stretch on 1/2 foam roll x3 min  +horizontal abduction on 1/2 foam " roll 2x10    received the following manual therapy techniques: Soft tissue Mobilization were applied to the: - for 0 minutes, including:  Sub occipital release  Manual UT stretch  STM to R UT     Pt cleared of contraindications and verbal and written consent acquired. Pt given option of copy of consent form. Pt educated on benefits and potential side effects of DN. DN for 15 minutes with pt in prone position. Needles inserted into B upper traps and levator insertions for 10 minutes with no adverse results.    Patient provided written and verbal consent to receive functional dry needling at today's visit (see consent form scanned into chart). FDN performed to reduce pain and muscle tension, promote blood flow, and improve ROM and function. Pt tolerated tx well without adverse effects. She was educated on what to expect following the procedure and she verbalized understanding.    Jeanne received an ice pack to cervical/thoracic paraspinals for - minutes    PATIENT EDUCATION AND HOME EXERCISES     Home Exercises Provided and Patient Education Provided     Education provided:   - PT role and POC  - HEP  - FDN informed constent     Written Home Exercises Provided: Patient instructed to cont prior HEP. Exercises were reviewed and JEANNE was able to demonstrate them prior to the end of the session.  JEANNE demonstrated good  understanding of the education provided. See EMR under Patient Instructions for exercises provided during therapy sessions    ASSESSMENT   Jeanne tolerated treatment well. Continues to report localized R upper trap pain. Continues to work long hours without a break, educated on the importance of ergonomic breaks at work. Good carryover from previous sessions, good understanding of therex plan. She continues to report relief following FDN. Will continue to advance and monitor as able.     JEANNE Is progressing well towards her goals.   Pt prognosis is Good.     Pt will continue to benefit from skilled outpatient  physical therapy to address the deficits listed in the problem list box on initial evaluation, provide pt/family education and to maximize pt's level of independence in the home and community environment.     Pt's spiritual, cultural and educational needs considered and pt agreeable to plan of care and goals.     Anticipated barriers to physical therapy: none    Goals:   GOALS: Short Term Goals: 4 weeks  1.Report decreased in pain at worse less than  <   / =  8 /10  to increase tolerance for functional activities. On going  2. Pt to improve range of motion cervical by 25% to allow for improved functional mobility to allow for improvement in IADLs.  On going  3. Increased  Cervical MMT 1/2  grade to increase tolerance for ADL and work activities. On going  4. Pt to tolerate HEP to improve ROM and independence with ADL's. On going  5. Increased MMT  for lower traps/middle traps/rhomboids to > or = 3+/5 to increase tolerance for ADL and improve posture. On going     Long Term Goals: 8 weeks  1.Report decreased in pain at worse less than  <   / =  1  /10  to increase tolerance for functional activities. On going  2.Pt to improve range of motion by 75% to allow for improved functional mobility to allow for improvement in IADLs. On going  3.Increased cervical MMT 1 grade  to increase tolerance for ADL and work activities.On going  4.  Pt will report 33% on FOTO neck survey score for neck pain disability to demonstrate decrease in disability and improvement in neck pain.On going  5. Pt to be Independent with HEP to improve ROM and independence with ADL's. On going  6. Increased MMT  for lower traps/middle traps/rhomboids to > or = 4+/5 to increase tolerance for ADL and improve posture. On going    PLAN     Plan of care Certification: 8/16/2022 to 10/25/2022     Outpatient Physical Therapy 2 times weekly for 10 weeks to include the following interventions: Cervical/Lumbar Traction, Electrical Stimulation PRN, Gait  Training, Iontophoresis (with PRN), Manual Therapy, Moist Heat/ Ice, Neuromuscular Re-ed, Patient Education, Self Care, Therapeutic Activities and Therapeutic Exercise. Dry needling Progress HEP towards D/C. Recommend F/U with MD if symptoms worsen or do not resolve. Patient may be seen by a PTA for treatment to carry out their plan of care.  Face-to-face conferences will be held.    Ziggy Hill, PT

## 2022-09-26 ENCOUNTER — TELEPHONE (OUTPATIENT)
Dept: RADIOLOGY | Facility: OTHER | Age: 42
End: 2022-09-26
Payer: COMMERCIAL

## 2022-10-13 ENCOUNTER — HOSPITAL ENCOUNTER (OUTPATIENT)
Dept: RADIOLOGY | Facility: OTHER | Age: 42
Discharge: HOME | End: 2022-10-13
Attending: OBSTETRICS & GYNECOLOGY
Payer: COMMERCIAL

## 2022-10-13 DIAGNOSIS — R92.8 ABNORMAL MAMMOGRAM: ICD-10-CM

## 2022-10-13 PROCEDURE — 77065 DX MAMMO INCL CAD UNI: CPT | Mod: TC,RT

## 2022-10-13 PROCEDURE — 77061 BREAST TOMOSYNTHESIS UNI: CPT | Mod: 26,RT,, | Performed by: RADIOLOGY

## 2022-10-13 PROCEDURE — 77065 MAMMO DIGITAL DIAGNOSTIC RIGHT WITH TOMO: ICD-10-PCS | Mod: 26,RT,, | Performed by: RADIOLOGY

## 2022-10-13 PROCEDURE — 77061 MAMMO DIGITAL DIAGNOSTIC RIGHT WITH TOMO: ICD-10-PCS | Mod: 26,RT,, | Performed by: RADIOLOGY

## 2022-10-13 PROCEDURE — 77065 DX MAMMO INCL CAD UNI: CPT | Mod: 26,RT,, | Performed by: RADIOLOGY

## 2022-10-14 ENCOUNTER — PATIENT MESSAGE (OUTPATIENT)
Dept: INTERNAL MEDICINE | Facility: CLINIC | Age: 42
End: 2022-10-14
Payer: COMMERCIAL

## 2022-11-21 ENCOUNTER — CLINICAL SUPPORT (OUTPATIENT)
Dept: ENDOSCOPY | Facility: HOSPITAL | Age: 42
End: 2022-11-21
Attending: INTERNAL MEDICINE
Payer: COMMERCIAL

## 2022-11-21 VITALS — WEIGHT: 185 LBS | HEIGHT: 59 IN | BODY MASS INDEX: 37.29 KG/M2

## 2022-11-21 DIAGNOSIS — Z12.11 ENCOUNTER FOR SCREENING COLONOSCOPY FOR NON-HIGH-RISK PATIENT: ICD-10-CM

## 2022-11-21 RX ORDER — POLYETHYLENE GLYCOL 3350, SODIUM SULFATE ANHYDROUS, SODIUM BICARBONATE, SODIUM CHLORIDE, POTASSIUM CHLORIDE 236; 22.74; 6.74; 5.86; 2.97 G/4L; G/4L; G/4L; G/4L; G/4L
4 POWDER, FOR SOLUTION ORAL ONCE
Qty: 4000 ML | Refills: 0 | Status: SHIPPED | OUTPATIENT
Start: 2022-11-21 | End: 2022-11-21

## 2022-12-12 ENCOUNTER — ANESTHESIA EVENT (OUTPATIENT)
Dept: ENDOSCOPY | Facility: HOSPITAL | Age: 42
End: 2022-12-12
Payer: COMMERCIAL

## 2022-12-12 ENCOUNTER — ANESTHESIA (OUTPATIENT)
Dept: ENDOSCOPY | Facility: HOSPITAL | Age: 42
End: 2022-12-12
Payer: COMMERCIAL

## 2022-12-12 ENCOUNTER — HOSPITAL ENCOUNTER (OUTPATIENT)
Facility: HOSPITAL | Age: 42
Discharge: HOME | End: 2022-12-12
Attending: COLON & RECTAL SURGERY | Admitting: COLON & RECTAL SURGERY
Payer: COMMERCIAL

## 2022-12-12 VITALS
TEMPERATURE: 98 F | HEART RATE: 64 BPM | SYSTOLIC BLOOD PRESSURE: 113 MMHG | OXYGEN SATURATION: 100 % | WEIGHT: 185 LBS | HEIGHT: 60 IN | RESPIRATION RATE: 16 BRPM | DIASTOLIC BLOOD PRESSURE: 57 MMHG | BODY MASS INDEX: 36.32 KG/M2

## 2022-12-12 DIAGNOSIS — Z80.0 FAMILY HISTORY OF COLON CANCER: Primary | ICD-10-CM

## 2022-12-12 PROCEDURE — G0105 COLORECTAL SCRN; HI RISK IND: HCPCS | Performed by: COLON & RECTAL SURGERY

## 2022-12-12 PROCEDURE — 37000009 HC ANESTHESIA EA ADD 15 MINS: Performed by: COLON & RECTAL SURGERY

## 2022-12-12 PROCEDURE — 37000008 HC ANESTHESIA 1ST 15 MINUTES: Performed by: COLON & RECTAL SURGERY

## 2022-12-12 PROCEDURE — E9220 PRA ENDO ANESTHESIA: HCPCS | Mod: ,,, | Performed by: NURSE ANESTHETIST, CERTIFIED REGISTERED

## 2022-12-12 PROCEDURE — 25000003 PHARM REV CODE 250: Performed by: NURSE ANESTHETIST, CERTIFIED REGISTERED

## 2022-12-12 PROCEDURE — G0105 COLORECTAL SCRN; HI RISK IND: HCPCS | Mod: ,,, | Performed by: COLON & RECTAL SURGERY

## 2022-12-12 PROCEDURE — G0105 COLORECTAL SCRN; HI RISK IND: ICD-10-PCS | Mod: ,,, | Performed by: COLON & RECTAL SURGERY

## 2022-12-12 PROCEDURE — 25000003 PHARM REV CODE 250: Performed by: COLON & RECTAL SURGERY

## 2022-12-12 PROCEDURE — 63600175 PHARM REV CODE 636 W HCPCS: Performed by: NURSE ANESTHETIST, CERTIFIED REGISTERED

## 2022-12-12 PROCEDURE — E9220 PRA ENDO ANESTHESIA: ICD-10-PCS | Mod: ,,, | Performed by: NURSE ANESTHETIST, CERTIFIED REGISTERED

## 2022-12-12 RX ORDER — LIDOCAINE HYDROCHLORIDE 20 MG/ML
INJECTION INTRAVENOUS
Status: DISCONTINUED | OUTPATIENT
Start: 2022-12-12 | End: 2022-12-12

## 2022-12-12 RX ORDER — SODIUM CHLORIDE 9 MG/ML
INJECTION, SOLUTION INTRAVENOUS CONTINUOUS
Status: DISCONTINUED | OUTPATIENT
Start: 2022-12-12 | End: 2022-12-12 | Stop reason: HOSPADM

## 2022-12-12 RX ORDER — PROPOFOL 10 MG/ML
VIAL (ML) INTRAVENOUS CONTINUOUS PRN
Status: DISCONTINUED | OUTPATIENT
Start: 2022-12-12 | End: 2022-12-12

## 2022-12-12 RX ORDER — PROPOFOL 10 MG/ML
VIAL (ML) INTRAVENOUS
Status: DISCONTINUED | OUTPATIENT
Start: 2022-12-12 | End: 2022-12-12

## 2022-12-12 RX ADMIN — PROPOFOL 30 MG: 10 INJECTION, EMULSION INTRAVENOUS at 02:12

## 2022-12-12 RX ADMIN — LIDOCAINE HYDROCHLORIDE 50 MG: 20 INJECTION INTRAVENOUS at 02:12

## 2022-12-12 RX ADMIN — PROPOFOL 150 MCG/KG/MIN: 10 INJECTION, EMULSION INTRAVENOUS at 02:12

## 2022-12-12 RX ADMIN — SODIUM CHLORIDE: 0.9 INJECTION, SOLUTION INTRAVENOUS at 02:12

## 2022-12-12 RX ADMIN — PROPOFOL 70 MG: 10 INJECTION, EMULSION INTRAVENOUS at 02:12

## 2022-12-12 NOTE — TRANSFER OF CARE
"Anesthesia Transfer of Care Note    Patient: Chasity Kwan    Procedure(s) Performed: Procedure(s) (LRB):  COLONOSCOPY (N/A)    Patient location: GI    Anesthesia Type: general    Transport from OR: Transported from OR on room air with adequate spontaneous ventilation    Post pain: adequate analgesia    Post assessment: no apparent anesthetic complications and tolerated procedure well    Post vital signs: stable    Level of consciousness: awake, alert and oriented    Nausea/Vomiting: no nausea/vomiting    Complications: none    Transfer of care protocol was followed      Last vitals:   Visit Vitals  BP (!) 111/58   Pulse 72   Temp 36.5 °C (97.7 °F) (Tympanic)   Resp 16   Ht 4' 11.5" (1.511 m)   Wt 83.9 kg (185 lb)   SpO2 99%   Breastfeeding No   BMI 36.74 kg/m²     "

## 2022-12-12 NOTE — ANESTHESIA POSTPROCEDURE EVALUATION
Anesthesia Post Evaluation    Patient: Chasity Kwan    Procedure(s) Performed: Procedure(s) (LRB):  COLONOSCOPY (N/A)    Final Anesthesia Type: general      Patient location during evaluation: PACU  Patient participation: Yes- Able to Participate  Level of consciousness: awake and alert and oriented  Post-procedure vital signs: reviewed and stable  Pain management: adequate  Airway patency: patent    PONV status at discharge: No PONV  Anesthetic complications: no      Cardiovascular status: blood pressure returned to baseline and hemodynamically stable  Respiratory status: unassisted and spontaneous ventilation  Hydration status: euvolemic  Follow-up not needed.          Vitals Value Taken Time   /58 12/12/22 1501   Temp 36.5 °C (97.7 °F) 12/12/22 1500   Pulse 72 12/12/22 1501   Resp 16 12/12/22 1501   SpO2 99 % 12/12/22 1501         No case tracking events are documented in the log.      Pain/Crystal Score: Crystal Score: 10 (12/12/2022  3:00 PM)

## 2022-12-12 NOTE — ANESTHESIA PREPROCEDURE EVALUATION
12/12/2022  Chasity Kwan is a 42 y.o., female.    Active Problem List with Overview Notes    Diagnosis Date Noted    Chronic right shoulder pain 08/16/2022    Decreased functional activity tolerance 08/16/2022    Fatigue     History of laparoscopic partial gastrectomy 04/08/2015    GERD (gastroesophageal reflux disease) 03/24/2014    History of uterine fibroid     Sacroiliac pain 10/17/2013    Vitamin D deficiency disease 02/05/2013    Vitamin B12 deficiency 02/05/2013    Right leg pain 08/21/2012    Obesity 08/21/2012    Anxiety 08/21/2012    Hyperlipidemia     Hypertension      Past Surgical History:   Procedure Laterality Date    ESOPHAGOGASTRODUODENOSCOPY      GASTRECTOMY      gastric sleeve  2015    LAPAROTOMY      SI joint injection       Results for orders placed or performed during the hospital encounter of 11/25/14   ECG 12 lead    Collection Time: 11/25/14  8:23 AM    Narrative    Test Reason : V72.84  Blood Pressure :  mmHG  Vent. Rate : 075 BPM     Atrial Rate : 075 BPM     P-R Int : 140 ms          QRS Dur : 074 ms      QT Int : 360 ms       P-R-T Axes : 001 029 012 degrees     QTc Int : 402 ms    Normal sinus rhythm  Normal ECG  When compared with ECG of 20-MAR-2014 09:54,  No significant change was found  Confirmed by Sandra Marie MD ([id number]) on 11/25/2014 9:48:48 AM    Referred By: IKE DE LA PAZ           Confirmed By:Sandra Marie MD       Pre-op Assessment    I have reviewed the Patient Summary Reports.    I have reviewed the NPO Status.   I have reviewed the Medications.     Review of Systems  Anesthesia Hx:  No problems with previous Anesthesia    Social:  Non-Smoker, Social Alcohol Use    Hematology/Oncology:  Hematology Normal   Oncology Normal     EENT/Dental:EENT/Dental Normal   Cardiovascular:   Hypertension hyperlipidemia    Pulmonary:  Pulmonary  Normal    Renal/:  Renal/ Normal     Hepatic/GI:   GERD    Musculoskeletal:  Musculoskeletal Normal    Neurological:  Neurology Normal    Endocrine:  Endocrine Normal  Obesity / BMI > 30  Dermatological:  Skin Normal    Psych:   anxiety          Physical Exam  General: Well nourished, Cooperative, Alert and Oriented    Airway:  Mallampati: II   Mouth Opening: Normal  TM Distance: Normal  Tongue: Normal  Neck ROM: Normal ROM    Dental:  Intact    Chest/Lungs:  Clear to auscultation, Normal Respiratory Rate    Heart:  Rate: Normal  Rhythm: Regular Rhythm        Anesthesia Plan  Type of Anesthesia, risks & benefits discussed:    Anesthesia Type: Gen Natural Airway  Intra-op Monitoring Plan: Standard ASA Monitors  Post Op Pain Control Plan: multimodal analgesia  Induction:  IV  Informed Consent: Informed consent signed with the Patient and all parties understand the risks and agree with anesthesia plan.  All questions answered.   ASA Score: 2  Day of Surgery Review of History & Physical: H&P Update referred to the surgeon/provider.    Ready For Surgery From Anesthesia Perspective.     .

## 2022-12-12 NOTE — [employer] PATIENT INSTRUCTIONS
Discharge Summary/Instructions after an Endoscopic Procedure  Patient Name: Chasity Kwan  Patient MRN: [id number]  Patient YOB: 1980 Monday, December 12, 2022  Michelle Denton MD  Dear patient,  As a result of recent federal legislation (The Federal Cures Act), you may   receive lab or pathology results from your procedure in your [facility]   account before your physician is able to contact you. Your physician or   their representative will relay the results to you with their   recommendations at their soonest availability.  Thank you,  RESTRICTIONS:  During your procedure today, you received medications for sedation.  These   medications may affect your judgment, balance and coordination.  Therefore,   for 24 hours, you have the following restrictions:   - DO NOT drive a car, operate machinery, make legal/financial decisions,   sign important papers or drink alcohol.    ACTIVITY:  Today: no heavy lifting, straining or running due to procedural   sedation/anesthesia.  The following day: return to full activity including work.  DIET:  Eat and drink normally unless instructed otherwise.     TREATMENT FOR COMMON SIDE EFFECTS:  - Mild abdominal pain, nausea, belching, bloating or excessive gas:  rest,   eat lightly and use a heating pad.  - Sore Throat: treat with throat lozenges and/or gargle with warm salt   water.  - Because air was used during the procedure, expelling large amounts of air   from your rectum or belching is normal.  - If a bowel prep was taken, you may not have a bowel movement for 1-3 days.    This is normal.  SYMPTOMS TO WATCH FOR AND REPORT TO YOUR PHYSICIAN:  1. Abdominal pain or bloating, other than gas cramps.  2. Chest pain.  3. Back pain.  4. Signs of infection such as: chills or fever occurring within 24 hours   after the procedure.  5. Rectal bleeding, which would show as bright red, maroon, or black stools.   (A tablespoon of blood from the rectum is not serious,  especially if   hemorrhoids are present.)  6. Vomiting.  7. Weakness or dizziness.  GO DIRECTLY TO THE NEAREST EMERGENCY ROOM IF YOU HAVE ANY OF THE FOLLOWING:      Difficulty breathing              Chills and/or fever over 101 F   Persistent vomiting and/or vomiting blood   Severe abdominal pain   Severe chest pain   Black, tarry stools   Bleeding- more than one tablespoon   Any other symptom or condition that you feel may need urgent attention  Your doctor recommends these additional instructions:  If any biopsies were taken, your doctors clinic will contact you in 1 to 2   weeks with any results.  - Discharge patient to home.   - Resume previous diet.   - Continue present medications.   - Repeat colonoscopy in 5 years for screening purposes.   - Return to primary care physician.   - Written discharge instructions were provided to the patient.   - The signs and symptoms of potential delayed complications were discussed   with the patient.   - Patient has a contact number available for emergencies.   - Return to normal activities tomorrow.  For questions, problems or results please call your physician - Michelle Denton MD at Work:  [phone].  [facility], EMERGENCY ROOM PHONE NUMBER: [phone]  IF A COMPLICATION OR EMERGENCY SITUATION ARISES AND YOU ARE UNABLE TO REACH   YOUR PHYSICIAN - GO DIRECTLY TO THE EMERGENCY ROOM.  Michelle Denton MD  12/12/2022 2:56:59 PM  This report has been verified and signed electronically.  Dear patient,  As a result of recent federal legislation (The Federal Cures Act), you may   receive lab or pathology results from your procedure in your [facility]   account before your physician is able to contact you. Your physician or   their representative will relay the results to you with their   recommendations at their soonest availability.  Thank you,  [employer]ATION

## 2022-12-12 NOTE — H&P
"COLONOSCOPY HISTORY AND PHYSICAL EXAM    Procedure : Colonoscopy      INDICATIONS: family history of colon cancer (father, 50s) and family history of colon polyps    Family Hx of CRC: Father (50s); sister with polyps    Last Colonoscopy:  None  Findings: n/a       Past Medical History:   Diagnosis Date    Abnormal Pap smear 2009    colpo    Genital herpes in women     H/O: myomectomy     EX lap    History of uterine fibroid     Hyperlipidemia     Hypertension     Morbid obesity     Pseudotumor cerebri      Sedation Problems: NO  Family History   Problem Relation Age of Onset    Hypertension Mother     Hyperlipidemia Mother     Graves' disease Mother     Obesity Mother     Hyperlipidemia Sister     Obesity Sister     Colon cancer Father 50    Cataracts Father     Cancer Father         colon, liver, and stomach    Heart disease Maternal Grandfather     Stroke Maternal Grandmother     Diabetes Paternal Grandmother     No Known Problems Paternal Grandfather     Breast cancer Neg Hx     Ovarian cancer Neg Hx      Fam Hx of Sedation Problems: NO  Social History     Socioeconomic History    Marital status: Single   Occupational History     Employer: [employer]   Tobacco Use    Smoking status: Never    Smokeless tobacco: Never   Substance and Sexual Activity    Alcohol use: Yes     Comment: occasionally    Drug use: No    Sexual activity: Not Currently     Partners: Male     Birth control/protection: OCP       Review of Systems - Negative except   Respiratory ROS: no dyspnea  Cardiovascular ROS: no exertional chest pain  Gastrointestinal ROS: NO abdominal discomfort,  NO rectal bleeding  Musculoskeletal ROS: no muscular pain  Neurological ROS: no recent stroke    Physical Exam:  BP (!) 122/56 (BP Location: Left arm, Patient Position: Lying)   Pulse 72   Temp 97.7 °F (36.5 °C) (Temporal)   Resp 16   Ht 4' 11.5" (1.511 m)   Wt 83.9 kg (185 lb)   SpO2 98%   Breastfeeding No   BMI 36.74 kg/m²   General: no " distress  Head: normocephalic  Mallampati Score   Neck: supple, symmetrical, trachea midline  Lungs:  clear to auscultation bilaterally and normal respiratory effort  Heart: regular rate and rhythm and no murmur  Abdomen: soft, non-tender non-distented; bowel sounds normal; no masses,  no organomegaly  Extremities: no cyanosis or edema, or clubbing    ASA:  II    PLAN  COLONOSCOPY.    SedationPlan :MAC    The details of the procedure, the possible need for biopsy or polypectomy and the potential risks including bleeding, perforation, missed polyps were discussed in detail.

## 2022-12-12 NOTE — PLAN OF CARE
Discharge instructions reviewed with the patient. Patient verbalizes understanding. Ready for discharge.

## 2022-12-27 ENCOUNTER — PATIENT MESSAGE (OUTPATIENT)
Dept: INTERNAL MEDICINE | Facility: CLINIC | Age: 42
End: 2022-12-27
Payer: COMMERCIAL

## 2023-01-05 ENCOUNTER — OFFICE VISIT (OUTPATIENT)
Dept: ORTHOPEDICS | Facility: CLINIC | Age: 43
End: 2023-01-05
Payer: COMMERCIAL

## 2023-01-05 DIAGNOSIS — G89.29 CHRONIC RIGHT SHOULDER PAIN: ICD-10-CM

## 2023-01-05 DIAGNOSIS — M25.511 CHRONIC RIGHT SHOULDER PAIN: ICD-10-CM

## 2023-01-05 DIAGNOSIS — M75.51 SUBACROMIAL BURSITIS OF RIGHT SHOULDER JOINT: Primary | ICD-10-CM

## 2023-01-05 PROCEDURE — 1159F PR MEDICATION LIST DOCUMENTED IN MEDICAL RECORD: ICD-10-PCS | Mod: CPTII,95,, | Performed by: PHYSICIAN ASSISTANT

## 2023-01-05 PROCEDURE — 99212 PR OFFICE/OUTPT VISIT, EST, LEVL II, 10-19 MIN: ICD-10-PCS | Mod: 95,,, | Performed by: PHYSICIAN ASSISTANT

## 2023-01-05 PROCEDURE — 1160F RVW MEDS BY RX/DR IN RCRD: CPT | Mod: CPTII,95,, | Performed by: PHYSICIAN ASSISTANT

## 2023-01-05 PROCEDURE — 99212 OFFICE O/P EST SF 10 MIN: CPT | Mod: 95,,, | Performed by: PHYSICIAN ASSISTANT

## 2023-01-05 PROCEDURE — 1160F PR REVIEW ALL MEDS BY PRESCRIBER/CLIN PHARMACIST DOCUMENTED: ICD-10-PCS | Mod: CPTII,95,, | Performed by: PHYSICIAN ASSISTANT

## 2023-01-05 PROCEDURE — 1159F MED LIST DOCD IN RCRD: CPT | Mod: CPTII,95,, | Performed by: PHYSICIAN ASSISTANT

## 2023-01-06 ENCOUNTER — TELEPHONE (OUTPATIENT)
Dept: OBSTETRICS AND GYNECOLOGY | Facility: CLINIC | Age: 43
End: 2023-01-06
Payer: COMMERCIAL

## 2023-01-06 NOTE — PROGRESS NOTES
Telemedicine/Virtual Visit Documentation:     The patient location is: home     The chief complaint leading to consultation is: see HPI    VISIT TYPE X   Virtual visit with synchronous audio and video X   Telephone E/M service: conducted via a two way audio call      Each patient to whom he or she provides medical services by telemedicine is:  (1) informed of the relationship between the physician and patient and the respective role of any other health care provider with respect to management of the patient; and (2) notified that he or she may decline to receive medical services by telemedicine and may withdraw from such care at any time.    Subjective:   Chief complaint: Follow-up right shoulder pain    HPI:   Chasity Kwan is a 42 y.o. female who presents today for follow-up.  She continues to complain of right shoulder pain.  This is worse with activity, reaching.  She states her pain has never improved despite an injection in March 2021.  She has also tried PT- she did this about 6 months ago and did dry needling.  The pain occasionally radiates to her elbow.  She has also tried ibuprofen and ice.  There was no new trauma.    ROS:  Per HPI       Objective:   Exam:  General: No acute distress, well-appearing  Neurologic: Alert and oriented x3  Psychiatric: Appropriate mood and affect, cooperative    Imaging:  MRI right shoulder in 2021  Focal area of interstitial tear within the superior fibers of the subscapularis tendon.        Assessment:     1. Subacromial bursitis of right shoulder joint    2. Chronic right shoulder pain        Patient is seen for a stable chronic problem (at treatment goal)   Data:    prior notes reviewed    Treatment plan: She will follow up in clinic for a steroid injection.  Discussed continued HEP, NSAIDS as needed     Plan:       - Follow up in clinic for injection    No orders of the defined types were placed in this encounter.      Past Medical History:   Diagnosis Date     Abnormal Pap smear 2009    colpo    Genital herpes in women     H/O: myomectomy     EX lap    History of uterine fibroid     Hyperlipidemia     Hypertension     Morbid obesity     Pseudotumor cerebri        Past Surgical History:   Procedure Laterality Date    COLONOSCOPY N/A 12/12/2022    Procedure: COLONOSCOPY;  Surgeon: Michelle Denton MD;  Location: [facility] END[facility] (4TH FLR);  Service: Endoscopy;  Laterality: N/A;  instr portal-GT  left vm 12/5 mal    ESOPHAGOGASTRODUODENOSCOPY      GASTRECTOMY      gastric sleeve  2015    LAPAROTOMY      SI joint injection         Family History   Problem Relation Age of Onset    Hypertension Mother     Hyperlipidemia Mother     Graves' disease Mother     Obesity Mother     Hyperlipidemia Sister     Obesity Sister     Colon cancer Father 50    Cataracts Father     Cancer Father         colon, liver, and stomach    Heart disease Maternal Grandfather     Stroke Maternal Grandmother     Diabetes Paternal Grandmother     No Known Problems Paternal Grandfather     Breast cancer Neg Hx     Ovarian cancer Neg Hx        Social History     Socioeconomic History    Marital status: Single   Occupational History     Employer: [employer]   Tobacco Use    Smoking status: Never    Smokeless tobacco: Never   Substance and Sexual Activity    Alcohol use: Yes     Comment: occasionally    Drug use: No    Sexual activity: Not Currently     Partners: Male     Birth control/protection: OCP

## 2023-01-16 ENCOUNTER — PATIENT MESSAGE (OUTPATIENT)
Dept: OBSTETRICS AND GYNECOLOGY | Facility: CLINIC | Age: 43
End: 2023-01-16
Payer: COMMERCIAL

## 2023-01-24 ENCOUNTER — PATIENT MESSAGE (OUTPATIENT)
Dept: INTERNAL MEDICINE | Facility: CLINIC | Age: 43
End: 2023-01-24
Payer: COMMERCIAL

## 2023-01-27 ENCOUNTER — OFFICE VISIT (OUTPATIENT)
Dept: INTERNAL MEDICINE | Facility: CLINIC | Age: 43
End: 2023-01-27
Payer: COMMERCIAL

## 2023-01-27 PROCEDURE — 1160F PR REVIEW ALL MEDS BY PRESCRIBER/CLIN PHARMACIST DOCUMENTED: ICD-10-PCS | Mod: CPTII,95,, | Performed by: INTERNAL MEDICINE

## 2023-01-27 PROCEDURE — 1159F MED LIST DOCD IN RCRD: CPT | Mod: CPTII,95,, | Performed by: INTERNAL MEDICINE

## 2023-01-27 PROCEDURE — 99214 PR OFFICE/OUTPT VISIT, EST, LEVL IV, 30-39 MIN: ICD-10-PCS | Mod: 95,,, | Performed by: INTERNAL MEDICINE

## 2023-01-27 PROCEDURE — 1160F RVW MEDS BY RX/DR IN RCRD: CPT | Mod: CPTII,95,, | Performed by: INTERNAL MEDICINE

## 2023-01-27 PROCEDURE — 1159F PR MEDICATION LIST DOCUMENTED IN MEDICAL RECORD: ICD-10-PCS | Mod: CPTII,95,, | Performed by: INTERNAL MEDICINE

## 2023-01-27 PROCEDURE — 99214 OFFICE O/P EST MOD 30 MIN: CPT | Mod: 95,,, | Performed by: INTERNAL MEDICINE

## 2023-01-27 RX ORDER — SEMAGLUTIDE 0.25 MG/.5ML
INJECTION, SOLUTION SUBCUTANEOUS
Qty: 8 ML | Refills: 2 | Status: SHIPPED | OUTPATIENT
Start: 2023-01-27 | End: 2023-03-24

## 2023-01-27 NOTE — PROGRESS NOTES
The patient location is: [place]  The chief complaint leading to consultation is: obesity  Visit type: Virtual visit with synchronous audio and video  Total time spent with patient: 15 minutes  Each patient to whom he or she provides medical services by telemedicine is:  (1) informed of the relationship between the physician and patient and the respective role of any other health care provider with respect to management of the patient; and (2) notified that he or she may decline to receive medical services by telemedicine and may withdraw from such care at any time.      CHIEF COMPLAINT     No chief complaint on file.  TELEMEDICINE VISIT    HPI     Chasity Kwan is 43 y.o. female hypertension hyperlipidemia anxiety BMI of 40 here today for     Reports she has gained approximately 20 pounds over holidays. Attributes to dietary indiscretion over holidays and work stress. Reports she has been active but doesn't appear to be helping. Interested in trying some pharmacotherapy.    Answers submitted by the patient for this visit:  Review of Systems Questionnaire (Submitted on 1/26/2023)  activity change: No  unexpected weight change: No  rhinorrhea: No  trouble swallowing: No  visual disturbance: No  chest tightness: No  polyuria: No  difficulty urinating: No  menstrual problem: No  joint swelling: No  arthralgias: No  confusion: No  dysphoric mood: No      Personally Reviewed Patient's Medical, surgical, family and social hx. Changes updated in P[facility]i.  Care Team updated in Epic    Review of Systems:  Review of Systems   HENT:  Negative for hearing loss.    Eyes:  Negative for discharge.   Respiratory:  Negative for wheezing.    Cardiovascular:  Negative for chest pain and palpitations.   Gastrointestinal:  Negative for blood in stool, constipation, diarrhea and vomiting.   Genitourinary:  Negative for dysuria and hematuria.   Musculoskeletal:  Positive for neck pain.   Neurological:  Negative for weakness and  headaches.   Endo/Heme/Allergies:  Negative for polydipsia.     Health Maintenance:   Reviewed with patient  Due for the following:      PHYSICAL EXAM       Gen: Well Appearing, NAD  HEENT: PERR, EOMI  Chest: Normal work of breathing,   Neuro: A&Ox3,  speech normal  Mood; Mood normal, behavior normal, thought process linear       LABS     Labs reviewed; Notable for    ASSESSMENT     1. BMI 40.0-44.9, adult  semaglutide, weight loss, (WEGOVY) 0.25 mg/0.5 mL PnIj                Plan     Chasity Kwan is a 43 y.o. female  1. BMI 40.0-44.9, adult  - semaglutide, weight loss, (WEGOVY) 0.25 mg/0.5 mL PnIj; Inject 0.25 mg into the skin every 7 days for 28 days, THEN 0.5 mg every 7 days.  Dispense: 8 mL; Refill: 2  Discussed therapeutic trial of wegov, patient amenable. Will start at .25mg weekly and increase to .5mg weekly once she tolerates.  Discussed lifestyle interventions for weight loss  Diet: harm reduction strategy: avoid liquid calories, add fruits and veggies to crowd out refined carbs, watch portions, eat at home more often  Activity: activity levelgoal of 150min/week  Sleep: optimize for 8 hours of sleep nightly  Hedonistic eating: avoid eating out of boredom, stress or in front of TV        Odilon Alas MD

## 2023-02-02 ENCOUNTER — PATIENT MESSAGE (OUTPATIENT)
Dept: INTERNAL MEDICINE | Facility: CLINIC | Age: 43
End: 2023-02-02
Payer: COMMERCIAL

## 2023-02-13 ENCOUNTER — PATIENT MESSAGE (OUTPATIENT)
Dept: INTERNAL MEDICINE | Facility: CLINIC | Age: 43
End: 2023-02-13
Payer: COMMERCIAL

## 2023-02-13 NOTE — TELEPHONE ENCOUNTER
Number pt provided if [facility] (home office) so when calling [phone] inform the person who answers the phone. Notified of need for a PA. The RA #[id number]. It will take 48-72 hours for a decision on coverage and they will fax the form to the office.

## 2023-02-14 ENCOUNTER — TELEPHONE (OUTPATIENT)
Dept: INTERNAL MEDICINE | Facility: CLINIC | Age: 43
End: 2023-02-14
Payer: COMMERCIAL

## 2023-03-03 ENCOUNTER — PATIENT MESSAGE (OUTPATIENT)
Dept: SPINE | Facility: CLINIC | Age: 43
End: 2023-03-03
Payer: COMMERCIAL

## 2023-03-08 ENCOUNTER — OFFICE VISIT (OUTPATIENT)
Dept: ORTHOPEDICS | Facility: CLINIC | Age: 43
End: 2023-03-08
Payer: COMMERCIAL

## 2023-03-08 ENCOUNTER — LAB VISIT (OUTPATIENT)
Dept: LAB | Facility: OTHER | Age: 43
End: 2023-03-08
Attending: INTERNAL MEDICINE
Payer: COMMERCIAL

## 2023-03-08 ENCOUNTER — OFFICE VISIT (OUTPATIENT)
Dept: OBSTETRICS AND GYNECOLOGY | Facility: CLINIC | Age: 43
End: 2023-03-08
Payer: COMMERCIAL

## 2023-03-08 ENCOUNTER — TELEPHONE (OUTPATIENT)
Dept: PAIN MEDICINE | Facility: CLINIC | Age: 43
End: 2023-03-08
Payer: COMMERCIAL

## 2023-03-08 ENCOUNTER — TELEPHONE (OUTPATIENT)
Dept: ORTHOPEDICS | Facility: CLINIC | Age: 43
End: 2023-03-08

## 2023-03-08 VITALS
SYSTOLIC BLOOD PRESSURE: 138 MMHG | BODY MASS INDEX: 40.72 KG/M2 | HEIGHT: 60 IN | DIASTOLIC BLOOD PRESSURE: 94 MMHG | WEIGHT: 207.44 LBS

## 2023-03-08 VITALS — BODY MASS INDEX: 41.82 KG/M2 | WEIGHT: 207.44 LBS | HEIGHT: 59 IN

## 2023-03-08 DIAGNOSIS — Z11.3 SCREEN FOR STD (SEXUALLY TRANSMITTED DISEASE): ICD-10-CM

## 2023-03-08 DIAGNOSIS — M75.51 SUBACROMIAL BURSITIS OF RIGHT SHOULDER JOINT: Primary | ICD-10-CM

## 2023-03-08 DIAGNOSIS — B00.9 HERPES: ICD-10-CM

## 2023-03-08 DIAGNOSIS — Z01.419 ENCOUNTER FOR GYNECOLOGICAL EXAMINATION WITHOUT ABNORMAL FINDING: Primary | ICD-10-CM

## 2023-03-08 DIAGNOSIS — Z30.9 ENCOUNTER FOR CONTRACEPTIVE MANAGEMENT, UNSPECIFIED TYPE: ICD-10-CM

## 2023-03-08 DIAGNOSIS — Z12.31 ENCOUNTER FOR SCREENING MAMMOGRAM FOR BREAST CANCER: ICD-10-CM

## 2023-03-08 DIAGNOSIS — M75.111 NONTRAUMATIC INCOMPLETE TEAR OF RIGHT ROTATOR CUFF: ICD-10-CM

## 2023-03-08 DIAGNOSIS — B37.9 CANDIDA INFECTION: ICD-10-CM

## 2023-03-08 PROCEDURE — 1160F PR REVIEW ALL MEDS BY PRESCRIBER/CLIN PHARMACIST DOCUMENTED: ICD-10-PCS | Mod: CPTII,S$GLB,, | Performed by: OBSTETRICS & GYNECOLOGY

## 2023-03-08 PROCEDURE — 99396 PR PREVENTIVE VISIT,EST,40-64: ICD-10-PCS | Mod: S$GLB,,, | Performed by: OBSTETRICS & GYNECOLOGY

## 2023-03-08 PROCEDURE — 1159F PR MEDICATION LIST DOCUMENTED IN MEDICAL RECORD: ICD-10-PCS | Mod: CPTII,S$GLB,, | Performed by: PHYSICIAN ASSISTANT

## 2023-03-08 PROCEDURE — 3080F PR MOST RECENT DIASTOLIC BLOOD PRESSURE >= 90 MM HG: ICD-10-PCS | Mod: CPTII,S$GLB,, | Performed by: OBSTETRICS & GYNECOLOGY

## 2023-03-08 PROCEDURE — 99213 PR OFFICE/OUTPT VISIT, EST, LEVL III, 20-29 MIN: ICD-10-PCS | Mod: 25,S$GLB,, | Performed by: PHYSICIAN ASSISTANT

## 2023-03-08 PROCEDURE — 99396 PREV VISIT EST AGE 40-64: CPT | Mod: S$GLB,,, | Performed by: OBSTETRICS & GYNECOLOGY

## 2023-03-08 PROCEDURE — 87389 HIV-1 AG W/HIV-1&-2 AB AG IA: CPT | Performed by: OBSTETRICS & GYNECOLOGY

## 2023-03-08 PROCEDURE — 3008F BODY MASS INDEX DOCD: CPT | Mod: CPTII,S$GLB,, | Performed by: OBSTETRICS & GYNECOLOGY

## 2023-03-08 PROCEDURE — 3008F PR BODY MASS INDEX (BMI) DOCUMENTED: ICD-10-PCS | Mod: CPTII,S$GLB,, | Performed by: OBSTETRICS & GYNECOLOGY

## 2023-03-08 PROCEDURE — 88175 CYTOPATH C/V AUTO FLUID REDO: CPT | Performed by: PATHOLOGY

## 2023-03-08 PROCEDURE — 99999 PR PBB SHADOW E&M-EST. PATIENT-LVL IV: ICD-10-PCS | Mod: PBBFAC,,, | Performed by: PHYSICIAN ASSISTANT

## 2023-03-08 PROCEDURE — 81514 NFCT DS BV&VAGINITIS DNA ALG: CPT | Performed by: OBSTETRICS & GYNECOLOGY

## 2023-03-08 PROCEDURE — 20610 LARGE JOINT ASPIRATION/INJECTION: R SUBACROMIAL BURSA: ICD-10-PCS | Mod: RT,S$GLB,, | Performed by: PHYSICIAN ASSISTANT

## 2023-03-08 PROCEDURE — 1159F MED LIST DOCD IN RCRD: CPT | Mod: CPTII,S$GLB,, | Performed by: OBSTETRICS & GYNECOLOGY

## 2023-03-08 PROCEDURE — 20610 DRAIN/INJ JOINT/BURSA W/O US: CPT | Mod: RT,S$GLB,, | Performed by: PHYSICIAN ASSISTANT

## 2023-03-08 PROCEDURE — 3008F PR BODY MASS INDEX (BMI) DOCUMENTED: ICD-10-PCS | Mod: CPTII,S$GLB,, | Performed by: PHYSICIAN ASSISTANT

## 2023-03-08 PROCEDURE — 3075F SYST BP GE 130 - 139MM HG: CPT | Mod: CPTII,S$GLB,, | Performed by: OBSTETRICS & GYNECOLOGY

## 2023-03-08 PROCEDURE — 1159F PR MEDICATION LIST DOCUMENTED IN MEDICAL RECORD: ICD-10-PCS | Mod: CPTII,S$GLB,, | Performed by: OBSTETRICS & GYNECOLOGY

## 2023-03-08 PROCEDURE — 1160F PR REVIEW ALL MEDS BY PRESCRIBER/CLIN PHARMACIST DOCUMENTED: ICD-10-PCS | Mod: CPTII,S$GLB,, | Performed by: PHYSICIAN ASSISTANT

## 2023-03-08 PROCEDURE — 3008F BODY MASS INDEX DOCD: CPT | Mod: CPTII,S$GLB,, | Performed by: PHYSICIAN ASSISTANT

## 2023-03-08 PROCEDURE — 1160F RVW MEDS BY RX/DR IN RCRD: CPT | Mod: CPTII,S$GLB,, | Performed by: PHYSICIAN ASSISTANT

## 2023-03-08 PROCEDURE — 99999 PR PBB SHADOW E&M-EST. PATIENT-LVL IV: CPT | Mod: PBBFAC,,, | Performed by: PHYSICIAN ASSISTANT

## 2023-03-08 PROCEDURE — 99999 PR PBB SHADOW E&M-EST. PATIENT-LVL III: ICD-10-PCS | Mod: PBBFAC,,, | Performed by: OBSTETRICS & GYNECOLOGY

## 2023-03-08 PROCEDURE — 87591 N.GONORRHOEAE DNA AMP PROB: CPT | Performed by: OBSTETRICS & GYNECOLOGY

## 2023-03-08 PROCEDURE — 86592 SYPHILIS TEST NON-TREP QUAL: CPT | Performed by: OBSTETRICS & GYNECOLOGY

## 2023-03-08 PROCEDURE — 36415 COLL VENOUS BLD VENIPUNCTURE: CPT | Performed by: OBSTETRICS & GYNECOLOGY

## 2023-03-08 PROCEDURE — 3080F DIAST BP >= 90 MM HG: CPT | Mod: CPTII,S$GLB,, | Performed by: OBSTETRICS & GYNECOLOGY

## 2023-03-08 PROCEDURE — 1160F RVW MEDS BY RX/DR IN RCRD: CPT | Mod: CPTII,S$GLB,, | Performed by: OBSTETRICS & GYNECOLOGY

## 2023-03-08 PROCEDURE — 3075F PR MOST RECENT SYSTOLIC BLOOD PRESS GE 130-139MM HG: ICD-10-PCS | Mod: CPTII,S$GLB,, | Performed by: OBSTETRICS & GYNECOLOGY

## 2023-03-08 PROCEDURE — 1159F MED LIST DOCD IN RCRD: CPT | Mod: CPTII,S$GLB,, | Performed by: PHYSICIAN ASSISTANT

## 2023-03-08 PROCEDURE — 88141 CYTOPATH C/V INTERPRET: CPT | Mod: ,,, | Performed by: PATHOLOGY

## 2023-03-08 PROCEDURE — 99999 PR PBB SHADOW E&M-EST. PATIENT-LVL III: CPT | Mod: PBBFAC,,, | Performed by: OBSTETRICS & GYNECOLOGY

## 2023-03-08 PROCEDURE — 88141 PR  CYTOPATH CERV/VAG INTERPRET: ICD-10-PCS | Mod: ,,, | Performed by: PATHOLOGY

## 2023-03-08 PROCEDURE — 99213 OFFICE O/P EST LOW 20 MIN: CPT | Mod: 25,S$GLB,, | Performed by: PHYSICIAN ASSISTANT

## 2023-03-08 PROCEDURE — 80074 ACUTE HEPATITIS PANEL: CPT | Performed by: OBSTETRICS & GYNECOLOGY

## 2023-03-08 RX ORDER — METHYLPREDNISOLONE 4 MG/1
TABLET ORAL
COMMUNITY
Start: 2022-06-10 | End: 2023-12-18

## 2023-03-08 RX ORDER — CLONAZEPAM 1 MG/1
1 TABLET ORAL 2 TIMES DAILY PRN
COMMUNITY
Start: 2023-02-13

## 2023-03-08 RX ORDER — TRIAMCINOLONE ACETONIDE 40 MG/ML
40 INJECTION, SUSPENSION INTRA-ARTICULAR; INTRAMUSCULAR
Status: DISCONTINUED | OUTPATIENT
Start: 2023-03-08 | End: 2023-03-08 | Stop reason: HOSPADM

## 2023-03-08 RX ORDER — VALACYCLOVIR HYDROCHLORIDE 500 MG/1
TABLET, FILM COATED ORAL
Qty: 60 TABLET | Refills: 3 | Status: SHIPPED | OUTPATIENT
Start: 2023-03-08 | End: 2023-04-13 | Stop reason: SDUPTHER

## 2023-03-08 RX ORDER — CYCLOBENZAPRINE HCL 10 MG
10 TABLET ORAL 3 TIMES DAILY PRN
Qty: 28 TABLET | Refills: 0 | Status: SHIPPED | OUTPATIENT
Start: 2023-03-08 | End: 2023-03-18

## 2023-03-08 RX ORDER — POLYETHYLENE GLYCOL-3350 AND ELECTROLYTES 236; 6.74; 5.86; 2.97; 22.74 G/274.31G; G/274.31G; G/274.31G; G/274.31G; G/274.31G
POWDER, FOR SOLUTION ORAL
COMMUNITY
Start: 2022-11-21 | End: 2023-12-18

## 2023-03-08 RX ORDER — NYSTATIN 100000 [USP'U]/G
POWDER TOPICAL 2 TIMES DAILY
Qty: 30 G | Refills: 3 | Status: SHIPPED | OUTPATIENT
Start: 2023-03-08 | End: 2023-12-18

## 2023-03-08 RX ORDER — CYCLOBENZAPRINE HCL 5 MG
5 TABLET ORAL 2 TIMES DAILY PRN
COMMUNITY
Start: 2022-06-10 | End: 2023-03-08

## 2023-03-08 RX ORDER — DROSPIRENONE AND ETHINYL ESTRADIOL 0.02-3(28)
1 KIT ORAL DAILY
Qty: 84 TABLET | Refills: 3 | Status: SHIPPED | OUTPATIENT
Start: 2023-03-08 | End: 2023-08-06 | Stop reason: SDUPTHER

## 2023-03-08 RX ORDER — ARIPIPRAZOLE 2 MG/1
2 TABLET ORAL
COMMUNITY
Start: 2022-12-12

## 2023-03-08 RX ADMIN — TRIAMCINOLONE ACETONIDE 40 MG: 40 INJECTION, SUSPENSION INTRA-ARTICULAR; INTRAMUSCULAR at 01:03

## 2023-03-08 NOTE — PROGRESS NOTES
"Patient ID: Chasity Kwan is a 43 y.o. female.    Chief Complaint: Injections of the Right Shoulder and Pain of the Neck      HISTORY:  Chasity Kwan is a 43 y.o. female who returns to me today for follow up of right shoulder pain.  She was last seen by me 1/5/2023.  Today she is doing well, but notes continued pain in her right shoulder that has worsened recently.  She is also having some pain in her neck/trapezius.  She did have some relief with injection in her shoulder previously and would like to try one more.       PMH/PSH/FamHx/SocHx:    Unchanged from prior visit.    ROS:  Constitution: Negative for chills, fever and weakness.   Respiratory: Negative for cough and shortness of breath.   Musculoskeletal: Positive for right shoulder  Psychiatric/Behavioral: The patient is not nervous/anxious.       PHYSICAL EXAM:   Ht 4' 11.49" (1.511 m)   Wt 94.1 kg (207 lb 7.3 oz)   LMP  (LMP Unknown)   BMI 41.22 kg/m²   Right shoulder  Skin intact  No effusion  TTP trapezius musculature  , ER 50, IR L1  + impingement  + beyer  - drop arm  4/5 supraspinatus    ASSESSMENT/PLAN:    Chasity was seen today for injections and pain.    Diagnoses and all orders for this visit:    Subacromial bursitis of right shoulder joint  -     Large Joint Aspiration/Injection: R subacromial bursa    Nontraumatic incomplete tear of right rotator cuff  -     Large Joint Aspiration/Injection: R subacromial bursa    Other orders  -     cyclobenzaprine (FLEXERIL) 10 MG tablet; Take 1 tablet (10 mg total) by mouth 3 (three) times daily as needed for Muscle spasms.    - Right shoulder CSI performed today- see procedure note  - We can consider referral back to PT for dry needling  - Flexeril to take as needed  - Referral to pain management for consideration of trigger point injection as this has helped previously  - Recommend follow up as needed        "

## 2023-03-08 NOTE — TELEPHONE ENCOUNTER
----- Message from Vicky Guajardo PA-C sent at 3/8/2023  2:39 PM CST -----  Can you call Ms. Kwan and let her know that the Nurse Practitioners at pain management do trigger point injections.  If she wants, you can get her scheduled to see Frances.  Thanks!  ----- Message -----  From: Frances Hanks NP  Sent: 3/8/2023   1:46 PM CST  To: JACQUELIN Lundbergliam Gonzalez,     Yes she can see either of us. We all do trigger point injections. She should be able to call or schedule on the portal with us. Either myself, Tiffanie Unger or Cheko Kwan.     Amu  ----- Message -----  From: Vicky Guajardo PA-C  Sent: 3/8/2023   1:33 PM CST  To: ZACK Galvan,  This patient couldn't get in to see dr. Orourke until April.  She was wanting a trigger point injection for pain she is having in her trapezius.  I know that you would need to evaluate her first, but I told her I would check with you to see if you or any of the other APPs you work with do trigger point injections in the clinic.    Thanks!  Lisa

## 2023-03-08 NOTE — PROGRESS NOTES
"CC: Well woman exam    Chasity Kwan is a 43 y.o. female  presents for a well woman exam.  She has no issues, problems, or complaints.  She would like a refill on OCPs  She would also like STI testing.   She has rash in the skin folds off and on      Past Medical History:   Diagnosis Date    Abnormal Pap smear     colpo    Genital herpes in women     H/O: myomectomy     EX lap    History of uterine fibroid     Hyperlipidemia     Hypertension     Morbid obesity     Pseudotumor cerebri        Past Surgical History:   Procedure Laterality Date    COLONOSCOPY N/A 2022    Procedure: COLONOSCOPY;  Surgeon: Michelle Denton MD;  Location: [facility] ([address] [place] [address]);  Service: Endoscopy;  Laterality: N/A;  instr portal-GT  left vm  mal    ESOPHAGOGASTRODUODENOSCOPY      GASTRECTOMY      gastric sleeve  2015    LAPAROTOMY      SI joint injection         OB History    Para Term  AB Living   0 0 0 0 0 0   SAB IAB Ectopic Multiple Live Births   0 0 0 0         Family History   Problem Relation Age of Onset    Hypertension Mother     Hyperlipidemia Mother     Graves' disease Mother     Obesity Mother     Hyperlipidemia Sister     Obesity Sister     Colon cancer Father 50    Cataracts Father     Cancer Father         colon, liver, and stomach    Heart disease Maternal Grandfather     Stroke Maternal Grandmother     Diabetes Paternal Grandmother     No Known Problems Paternal Grandfather     Breast cancer Neg Hx     Ovarian cancer Neg Hx        Social History     Tobacco Use    Smoking status: Never    Smokeless tobacco: Never   Substance Use Topics    Alcohol use: Yes     Comment: occasionally    Drug use: No       BP (!) 138/94   Ht 4' 11.5" (1.511 m)   Wt 94.1 kg (207 lb 7.3 oz)   LMP  (LMP Unknown)   BMI 41.20 kg/m²     ROS:  GENERAL: Denies weight gain or weight loss. Feeling well overall.   SKIN: Denies rash or lesions.   HEAD: Denies head injury or headache.   NODES: Denies " enlarged lymph nodes.   CHEST: Denies chest pain or shortness of breath.   CARDIOVASCULAR: Denies palpitations or left sided chest pain.   ABDOMEN: No abdominal pain, constipation, diarrhea, nausea, vomiting or rectal bleeding.   URINARY: No frequency, dysuria, hematuria, or burning on urination.  REPRODUCTIVE: See HPI.   BREASTS: The patient performs breast self-examination and denies pain, lumps, or nipple discharge.   HEMATOLOGIC: No easy bruisability or excessive bleeding.  MUSCULOSKELETAL: Denies joint pain or swelling.   NEUROLOGIC: Denies syncope or weakness.   PSYCHIATRIC: Denies depression, anxiety or mood swings.    Physical Exam:    APPEARANCE: Well nourished, well developed, in no acute distress.  AFFECT: WNL, alert and oriented x 3  SKIN: No acne or hirsutism  NECK: Neck symmetric without masses or thyromegaly  NODES: No inguinal, cervical, axillary, or femoral lymph node enlargement  CHEST: Good respiratory effect  ABDOMEN: Soft.  No tenderness or masses.  No hepatosplenomegaly.  No hernias.  BREASTS: Symmetrical, no skin changes or visible lesions.  No palpable masses, nipple discharge bilaterally.  PELVIC: Normal external genitalia without lesions.  Normal hair distribution.  Adequate perineal body, normal urethral meatus.  Vagina moist and well rugated without lesions or discharge.  Cervix pink, without lesions, discharge or tenderness.  No significant cystocele or rectocele.  Bimanual exam shows uterus to be normal size, regular, mobile and nontender.  Adnexa without masses or tenderness.    EXTREMITIES: No edema.    ASSESSMENT AND PLAN  1. Encounter for gynecological examination without abnormal finding  Liquid-Based Pap Smear, Screening      2. Encounter for contraceptive management, unspecified type  drospirenone-ethinyl estradioL (TIFFANIE) 3-0.02 mg per tablet      3. Herpes  valACYclovir (VALTREX) 500 MG tablet      4. Screen for STD (sexually transmitted disease)  HIV 1/2 Ag/Ab (4th Gen)    RPR     Hepatitis Panel, Acute    Vaginosis Screen by DNA Probe    C. trachomatis/N. gonorrhoeae by AMP DNA [facility]; Cervix      5. Encounter for screening mammogram for breast cancer  Mammo Digital Screening Bilat w/ Robert      6. Candida infection  nystatin (MYCOSTATIN) powder          Patient was counseled today on A.C.S. Pap guidelines and recommendations for yearly pelvic exams, mammograms and monthly self breast exams; to see her PCP for other health maintenance.     Follow up in about 1 year (around 3/8/2024).

## 2023-03-08 NOTE — PROCEDURES
Large Joint Aspiration/Injection: R subacromial bursa    Date/Time: 3/8/2023 1:00 PM  Performed by: Vicky Guajardo PA-C  Authorized by: Vicky Guajardo PA-C     Consent Done?:  Yes (Verbal)  Indications:  Pain  Prep: patient was prepped and draped in usual sterile fashion    Local anesthetic:  Topical anesthetic    Details:  Needle Size:  22 G  Approach:  Posterior  Location:  Shoulder  Site:  R subacromial bursa  Medications:  40 mg triamcinolone acetonide 40 mg/mL  Patient tolerance:  Patient tolerated the procedure well with no immediate complications

## 2023-03-09 ENCOUNTER — OFFICE VISIT (OUTPATIENT)
Dept: PAIN MEDICINE | Facility: CLINIC | Age: 43
End: 2023-03-09
Payer: COMMERCIAL

## 2023-03-09 VITALS
HEART RATE: 79 BPM | HEIGHT: 59 IN | SYSTOLIC BLOOD PRESSURE: 119 MMHG | BODY MASS INDEX: 41.9 KG/M2 | DIASTOLIC BLOOD PRESSURE: 80 MMHG

## 2023-03-09 DIAGNOSIS — M79.18 MYOFASCIAL PAIN: Primary | ICD-10-CM

## 2023-03-09 LAB
BACTERIAL VAGINOSIS DNA: NEGATIVE
CANDIDA GLABRATA DNA: NEGATIVE
CANDIDA KRUSEI DNA: NEGATIVE
CANDIDA RRNA VAG QL PROBE: POSITIVE
HAV IGM SERPL QL IA: NORMAL
HBV CORE IGM SERPL QL IA: NORMAL
HBV SURFACE AG SERPL QL IA: NORMAL
HCV AB SERPL QL IA: NORMAL
HIV 1+2 AB+HIV1 P24 AG SERPL QL IA: NORMAL
RPR SER QL: NORMAL
T VAGINALIS RRNA GENITAL QL PROBE: NEGATIVE

## 2023-03-09 PROCEDURE — 20553 PR INJECT TRIGGER POINTS, > 3: ICD-10-PCS | Mod: S$GLB,,, | Performed by: NURSE PRACTITIONER

## 2023-03-09 PROCEDURE — 99999 PR PBB SHADOW E&M-EST. PATIENT-LVL III: ICD-10-PCS | Mod: PBBFAC,,, | Performed by: NURSE PRACTITIONER

## 2023-03-09 PROCEDURE — 3008F PR BODY MASS INDEX (BMI) DOCUMENTED: ICD-10-PCS | Mod: CPTII,S$GLB,, | Performed by: NURSE PRACTITIONER

## 2023-03-09 PROCEDURE — 3008F BODY MASS INDEX DOCD: CPT | Mod: CPTII,S$GLB,, | Performed by: NURSE PRACTITIONER

## 2023-03-09 PROCEDURE — 99214 OFFICE O/P EST MOD 30 MIN: CPT | Mod: 25,S$GLB,, | Performed by: NURSE PRACTITIONER

## 2023-03-09 PROCEDURE — 1159F PR MEDICATION LIST DOCUMENTED IN MEDICAL RECORD: ICD-10-PCS | Mod: CPTII,S$GLB,, | Performed by: NURSE PRACTITIONER

## 2023-03-09 PROCEDURE — 99999 PR PBB SHADOW E&M-EST. PATIENT-LVL III: CPT | Mod: PBBFAC,,, | Performed by: NURSE PRACTITIONER

## 2023-03-09 PROCEDURE — 1159F MED LIST DOCD IN RCRD: CPT | Mod: CPTII,S$GLB,, | Performed by: NURSE PRACTITIONER

## 2023-03-09 PROCEDURE — 3079F PR MOST RECENT DIASTOLIC BLOOD PRESSURE 80-89 MM HG: ICD-10-PCS | Mod: CPTII,S$GLB,, | Performed by: NURSE PRACTITIONER

## 2023-03-09 PROCEDURE — 3079F DIAST BP 80-89 MM HG: CPT | Mod: CPTII,S$GLB,, | Performed by: NURSE PRACTITIONER

## 2023-03-09 PROCEDURE — 3074F SYST BP LT 130 MM HG: CPT | Mod: CPTII,S$GLB,, | Performed by: NURSE PRACTITIONER

## 2023-03-09 PROCEDURE — 20553 NJX 1/MLT TRIGGER POINTS 3/>: CPT | Mod: S$GLB,,, | Performed by: NURSE PRACTITIONER

## 2023-03-09 PROCEDURE — 3074F PR MOST RECENT SYSTOLIC BLOOD PRESSURE < 130 MM HG: ICD-10-PCS | Mod: CPTII,S$GLB,, | Performed by: NURSE PRACTITIONER

## 2023-03-09 PROCEDURE — 99214 PR OFFICE/OUTPT VISIT, EST, LEVL IV, 30-39 MIN: ICD-10-PCS | Mod: 25,S$GLB,, | Performed by: NURSE PRACTITIONER

## 2023-03-09 RX ORDER — BUPIVACAINE HYDROCHLORIDE 2.5 MG/ML
9 INJECTION, SOLUTION EPIDURAL; INFILTRATION; INTRACAUDAL
Status: COMPLETED | OUTPATIENT
Start: 2023-03-09 | End: 2023-03-09

## 2023-03-09 RX ORDER — DEXAMETHASONE SODIUM PHOSPHATE 4 MG/ML
4 INJECTION, SOLUTION INTRA-ARTICULAR; INTRALESIONAL; INTRAMUSCULAR; INTRAVENOUS; SOFT TISSUE
Status: COMPLETED | OUTPATIENT
Start: 2023-03-09 | End: 2023-03-09

## 2023-03-09 RX ADMIN — DEXAMETHASONE SODIUM PHOSPHATE 4 MG: 4 INJECTION, SOLUTION INTRA-ARTICULAR; INTRALESIONAL; INTRAMUSCULAR; INTRAVENOUS; SOFT TISSUE at 04:03

## 2023-03-09 RX ADMIN — BUPIVACAINE HYDROCHLORIDE 22.5 MG: 2.5 INJECTION, SOLUTION EPIDURAL; INFILTRATION; INTRACAUDAL at 10:03

## 2023-03-09 NOTE — PROGRESS NOTES
Chronic Pain - Established FU  Referring Physician: No ref. provider found    Chief Complaint:   No chief complaint on file.       SUBJECTIVE:    Interval History 3/9/2023:  Mrs Kwan presents for follow up delayed for R sided tarpezius and pain. She has had recent R shoulder CSI and requesting repeat TPI she has had in past to alleviate myofascial pain. She does have some radicular pain down R arm but again symptoms alleviated in past with TPI. The patient denies myelopathic symptoms such as handwriting changes or difficulty with buttons/coins/keys. Denies perineal paresthesias, bowel/bladder dysfunction.    Initial HPI:  Mrs. Kwan is a 41 yo female that presents today for initial evaluation of right neck and shoulder pain . She reports that her pain started 3 weeks; denies any inciting event or trauma. She reports that she did get a shoulder MRI about a year that did show a tear in her rotator cuff. However she reports that this pain is different from her shoulder pain she had a year ago stating that it more from the neck.      Describes the pain as sharp/achy/ and like a muscle pull in nature and rates her pain a 3/10 in the morning but worsens throughout the day and she rates it a 6/10. Pain is exacerbated by overhead movement and activity. Pain is relieved by heat, Acupuncture, Cyclobenzaprine. She reports that the pain does wake her up from sleep occasionally. She has not been to PT but has been doing HEP.  Denies focal weakness, headaches, blurred vision, dizziness, no dermatomal pattern of numbness/tingling.     Physical Therapy/Home Exercise: no      Pain Disability Index Review:  Last 3 PDI Scores 3/9/2023 7/7/2022   Pain Disability Index (PDI) 24 0       Pain Medications:    - Others: Cyclobenzaprine     report:  Not applicable    Pain Procedures:   None     Imaging:  3/26/21  EXAMINATION:  XR SHOULDER TRAUMA 3 VIEW RIGHT     CLINICAL HISTORY:  Pain in right shoulder     TECHNIQUE:  Three  views  of the right shoulder were performed.     COMPARISON:  None     FINDINGS:  No evidence of an acute fracture or dislocation.  No lytic or blastic lesion.  No significant joint space loss or degenerative changes.  Glenohumeral joint is intact.  Acromioclavicular joint is intact.  Soft tissues are within normal limits.     Impression:     Normal radiograph of the right shoulder    EXAMINATION:  MRI SHOULDER WITHOUT CONTRAST RIGHT     CLINICAL HISTORY:  Shoulder pain, rotator cuff disorder suspected, nondiagnostic xray;  Pain in right shoulder     TECHNIQUE:  Multisequence, multiplanar MR imaging of the shoulder performed without contrast.     COMPARISON:  03/26/2021 radiographs     FINDINGS:  Very mild increased signal at the bursal surface of the supraspinatus tendon consistent with minimal bursal surface fraying or tendinosis.  There is no tear.     The infraspinatus tendon appears normal.     The teres minor muscle and tendon appear normal.     There is focal thickening and signal change of the subscapularis tendon with an 8 mm transverse diameter area of interstitial tear.     The biceps tendon appears intact.     AC joint mild osseous hypertrophy, no significant associated edema.     No significant fluid within the subacromial subdeltoid bursa or within the glenohumeral joint.     The glenoid and labrum appear to be intact.     The humeral head is intact.     The remainder of the visualized soft tissue and osseous structures appear normal.     Impression:     Focal area of interstitial tear within the superior fibers of the subscapularis tendon.    Past Medical History:   Diagnosis Date    Abnormal Pap smear 2009    colpo    Genital herpes in women     H/O: myomectomy     EX lap    History of uterine fibroid     Hyperlipidemia     Hypertension     Morbid obesity     Pseudotumor cerebri      Past Surgical History:   Procedure Laterality Date    COLONOSCOPY N/A 12/12/2022    Procedure: COLONOSCOPY;  Surgeon:  Michelle Denton MD;  Location: [facility] (4TH FLR);  Service: Endoscopy;  Laterality: N/A;  instr portal-GT  left vm 12/5 mal    ESOPHAGOGASTRODUODENOSCOPY      GASTRECTOMY      gastric sleeve  2015    LAPAROTOMY      SI joint injection       Social History     Socioeconomic History    Marital status: Single   Occupational History     Employer: [employer]   Tobacco Use    Smoking status: Never    Smokeless tobacco: Never   Substance and Sexual Activity    Alcohol use: Yes     Comment: occasionally    Drug use: No    Sexual activity: Not Currently     Partners: Male     Birth control/protection: OCP     Family History   Problem Relation Age of Onset    Hypertension Mother     Hyperlipidemia Mother     Graves' disease Mother     Obesity Mother     Hyperlipidemia Sister     Obesity Sister     Colon cancer Father 50    Cataracts Father     Cancer Father         colon, liver, and stomach    Heart disease Maternal Grandfather     Stroke Maternal Grandmother     Diabetes Paternal Grandmother     No Known Problems Paternal Grandfather     Breast cancer Neg Hx     Ovarian cancer Neg Hx        Review of patient's allergies indicates:  No Known Allergies    Current Outpatient Medications   Medication Sig    ARIPiprazole (ABILIFY) 2 MG Tab Take 2 mg by mouth.    clonazePAM (KLONOPIN) 0.5 MG tablet Take 0.5 mg by mouth daily as needed.     clonazePAM (KLONOPIN) 1 MG tablet Take 1 mg by mouth 2 (two) times daily as needed.    cyclobenzaprine (FLEXERIL) 10 MG tablet Take 1 tablet (10 mg total) by mouth 3 (three) times daily as needed for Muscle spasms.    drospirenone-ethinyl estradioL (TIFFANIE) 3-0.02 mg per tablet Take 1 tablet by mouth once daily.    EScitalopram oxalate (LEXAPRO) 20 MG tablet     GAVILYTE-G 236-22.74-6.74 -5.86 gram suspension SMARTSIG:Milliliter(s) By Mouth    losartan (COZAAR) 25 MG tablet TAKE 1 TABLET EVERY DAY    methylPREDNISolone (MEDROL DOSEPACK) 4 mg tablet follow package directions     "multivitamin (THERAGRAN) per tablet Take 1 tablet by mouth once daily. Patient to stop[ 1 week before surgery starting 3/18/15    nystatin (MYCOSTATIN) powder Apply topically 2 (two) times daily.    propranoloL (INDERAL) 10 MG tablet     rosuvastatin (CRESTOR) 20 MG tablet Take 1 tablet (20 mg total) by mouth once daily.    semaglutide, weight loss, (WEGOVY) 0.25 mg/0.5 mL PnIj Inject 0.25 mg into the skin every 7 days for 28 days, THEN 0.5 mg every 7 days.    sumatriptan (IMITREX) 50 MG tablet Once for severe headache. May repeat once after 2 hours. Do not exceed 3-4 doses in one week.    valACYclovir (VALTREX) 500 MG tablet TAKE 1 TABLET EVERY DAY AS NEEDED    zolpidem (AMBIEN CR) 6.25 MG CR tablet Take 1 tablet by mouth every evening.     omeprazole (PRILOSEC) 40 MG capsule Take 1 capsule (40 mg total) by mouth every morning. Open capsule and take contents by mouth     No current facility-administered medications for this visit.       REVIEW OF SYSTEMS:    GENERAL:  No weight loss, malaise or fevers.  HEENT:  Negative for frequent or significant headaches.  NECK:  Negative for lumps, goiter, pain and significant neck swelling.  RESPIRATORY:  Negative for cough, wheezing or shortness of breath.  CARDIOVASCULAR:  Negative for chest pain, leg swelling or palpitations.  GI:  Negative for abdominal discomfort, blood in stools or black stools or change in bowel habits.  MUSCULOSKELETAL:  See HPI.  SKIN:  Negative for lesions, rash, and itching.  PSYCH:  Negative for sleep disturbance, mood disorder and recent psychosocial stressors.  HEMATOLOGY/LYMPHOLOGY:  Negative for prolonged bleeding, bruising easily or swollen nodes.  NEURO:   No history of headaches, syncope, paralysis, seizures or tremors.  All other reviewed and negative other than HPI.    OBJECTIVE:    /80 (BP Location: Left arm, Patient Position: Sitting, BP Method: Large (Automatic))   Pulse 79   Ht 4' 11" (1.499 m)   LMP  (LMP Unknown)   BMI " 41.90 kg/m²     GEN:  Well developed, well nourished.  No acute distress.   HEENT:  No trauma.  Mucous membranes moist.  Nares patent bilaterally.  PSYCH: Normal affect. Thought content appropriate.  CHEST:  Breathing symmetric.  No audible wheezing.  ABD: Soft, non-distended.  SKIN:  Warm, pink, dry.  No rash on exposed areas.    EXT:  No cyanosis, clubbing, or edema.  No color change or changes in nail or hair growth.  NEURO/MUSCULOSKELETAL:  Fully alert, oriented, and appropriate. Speech normal oliver. No cranial nerve deficits.   Gait: Normal.    Musculoskeletal: +TTP to R trapezius and cervical paraspinal to R.       Initial PHYSICAL EXAMINATION:    General appearance: Well appearing, in no acute distress, alert and oriented x3.  Psych:  Mood and affect appropriate.  Skin: Skin color, texture, turgor normal, no rashes or lesions, in both upper and lower body.  Head/face:  Normocephalic, atraumatic. No palpable lymph nodes.  Neck: pain with neck extension, or lateral flexion.  Negative Spurling bilaterally   TTP along right Trapezius, levator scapulae, and paravertebral musculature around C7/T1     Cor: RRR  Pulm: CTA  Extremities: No deformities, edema, or skin discoloration. Good capillary refill.  Musculoskeletal: Bilateral upper and lower extremity strength is normal and symmetric.  No atrophy or tone abnormalities are noted.    Right shoulder: Normal.      Left shoulder: Normal.      Cervical back: Normal range of motion. Tenderness present.      Comments: Positive Neer/[place] on the right  Negative [place] bilaterally   Muscles strength 5/5 throughout upper extremities   DTR+2 throughout upper extremities   Neuro: Bilateral upper and lower extremity coordination and muscle stretch reflexes are physiologic and symmetric. No loss of sensation is noted.  Gait: normal.      ASSESSMENT: 43 y.o. year old female with right sided neck pain, consistent with myofascial pain of the upper thoracic and lower cervical  region of the spine.      1. Myofascial pain                PLAN:     - Prior records reviewed    - Prior imaging reviewed    - TPI done today for acute myofascial pain    - RTC PRN    Cheko Kwan   03/09/2023    The above plan and management options were discussed at length with patient. Patient is in agreement with the above and verbalized understanding. It will be communicated with the referring physician via electronic record, fax, or mail.          Patient Name: Chasity Kwan  MRN: [id number]    INFORMED CONSENT: The procedure, risks, benefits and options were discussed with patient. There are no contraindications to the procedure. The patient expressed understanding and agreed to proceed. The personnel performing the procedure was discussed. I verify that I personally obtained Chasity's consent prior to the start of the procedure and the signed consent can be found on the patient's chart.    Procedure Date: 03/09/2023    Anesthesia: Topical    Pre Procedure diagnosis:   1. Myofascial pain          Post-Procedure diagnosis: same      Sedation: None    PROCEDURE: TRIGGER POINT INJECTION  The patient was placed in a seated position and time out was perfomed. The site of pain and procedure were confirmed with the patient prior to starting the procedure. After performing time out. The patient's  trigger points were identified and marked at right Trapezius and right semispinalis. The skin was prepped with chlorhexidine three times.  After performing time out A 27-gauge 1.5 inch  needle was advanced through the skin and subcutaneous tissues.  Aspiration for blood, air and CSF was negative.  A total of 4 ml of Bupivacaine 0.5% and4 mg Decadron was injected at all trigger point.  No complications were evident. No specimens collected.    Blood Loss: Nill  Specimen: None    Cheko Kwan NP

## 2023-03-10 LAB
C TRACH DNA SPEC QL NAA+PROBE: NOT DETECTED
N GONORRHOEA DNA SPEC QL NAA+PROBE: NOT DETECTED

## 2023-03-13 LAB
FINAL PATHOLOGIC DIAGNOSIS: NORMAL
Lab: NORMAL

## 2023-03-15 ENCOUNTER — TELEPHONE (OUTPATIENT)
Dept: OBSTETRICS AND GYNECOLOGY | Facility: HOSPITAL | Age: 43
End: 2023-03-15
Payer: COMMERCIAL

## 2023-03-15 DIAGNOSIS — B37.9 CANDIDA INFECTION: Primary | ICD-10-CM

## 2023-03-15 RX ORDER — FLUCONAZOLE 150 MG/1
150 TABLET ORAL ONCE AS NEEDED
Qty: 2 TABLET | Refills: 2 | Status: SHIPPED | OUTPATIENT
Start: 2023-03-15 | End: 2023-03-15

## 2023-03-16 ENCOUNTER — PATIENT MESSAGE (OUTPATIENT)
Dept: OBSTETRICS AND GYNECOLOGY | Facility: CLINIC | Age: 43
End: 2023-03-16
Payer: COMMERCIAL

## 2023-04-10 ENCOUNTER — PATIENT MESSAGE (OUTPATIENT)
Dept: INTERNAL MEDICINE | Facility: CLINIC | Age: 43
End: 2023-04-10
Payer: COMMERCIAL

## 2023-04-10 DIAGNOSIS — B00.9 HERPES: ICD-10-CM

## 2023-04-13 ENCOUNTER — TELEPHONE (OUTPATIENT)
Dept: INTERNAL MEDICINE | Facility: CLINIC | Age: 43
End: 2023-04-13
Payer: COMMERCIAL

## 2023-04-13 RX ORDER — VALACYCLOVIR HYDROCHLORIDE 500 MG/1
TABLET, FILM COATED ORAL
Qty: 60 TABLET | Refills: 3 | Status: SHIPPED | OUTPATIENT
Start: 2023-04-13 | End: 2023-08-06 | Stop reason: SDUPTHER

## 2023-04-13 NOTE — TELEPHONE ENCOUNTER
----- Message from Blanquita Luna sent at 4/12/2023 10:15 AM CDT -----  Contact: [phone]  Maris said that she is calling in regards to needing to get office notes and any clinicals for pt's Wegovy faxed to her Attn. Maris at:[phone] . Please advise

## 2023-04-24 ENCOUNTER — LAB VISIT (OUTPATIENT)
Dept: LAB | Facility: HOSPITAL | Age: 43
End: 2023-04-24
Attending: INTERNAL MEDICINE
Payer: COMMERCIAL

## 2023-04-24 ENCOUNTER — OFFICE VISIT (OUTPATIENT)
Dept: INTERNAL MEDICINE | Facility: CLINIC | Age: 43
End: 2023-04-24
Payer: COMMERCIAL

## 2023-04-24 VITALS
HEIGHT: 59 IN | SYSTOLIC BLOOD PRESSURE: 106 MMHG | BODY MASS INDEX: 40.72 KG/M2 | DIASTOLIC BLOOD PRESSURE: 86 MMHG | HEART RATE: 85 BPM | WEIGHT: 202 LBS | OXYGEN SATURATION: 98 %

## 2023-04-24 DIAGNOSIS — I10 PRIMARY HYPERTENSION: ICD-10-CM

## 2023-04-24 DIAGNOSIS — Z00.00 ANNUAL PHYSICAL EXAM: Primary | ICD-10-CM

## 2023-04-24 DIAGNOSIS — E78.5 HYPERLIPIDEMIA, UNSPECIFIED HYPERLIPIDEMIA TYPE: ICD-10-CM

## 2023-04-24 LAB
CHOLEST SERPL-MCNC: 260 MG/DL (ref 120–199)
CHOLEST/HDLC SERPL: 2.2 {RATIO} (ref 2–5)
HDLC SERPL-MCNC: 119 MG/DL (ref 40–75)
HDLC SERPL: 45.8 % (ref 20–50)
LDLC SERPL CALC-MCNC: 120.4 MG/DL (ref 63–159)
NONHDLC SERPL-MCNC: 141 MG/DL
TRIGL SERPL-MCNC: 103 MG/DL (ref 30–150)

## 2023-04-24 PROCEDURE — 99999 PR PBB SHADOW E&M-EST. PATIENT-LVL V: ICD-10-PCS | Mod: PBBFAC,,, | Performed by: INTERNAL MEDICINE

## 2023-04-24 PROCEDURE — 80061 LIPID PANEL: CPT | Performed by: INTERNAL MEDICINE

## 2023-04-24 PROCEDURE — 4010F ACE/ARB THERAPY RXD/TAKEN: CPT | Mod: CPTII,S$GLB,, | Performed by: INTERNAL MEDICINE

## 2023-04-24 PROCEDURE — 99396 PREV VISIT EST AGE 40-64: CPT | Mod: S$GLB,,, | Performed by: INTERNAL MEDICINE

## 2023-04-24 PROCEDURE — 99999 PR PBB SHADOW E&M-EST. PATIENT-LVL V: CPT | Mod: PBBFAC,,, | Performed by: INTERNAL MEDICINE

## 2023-04-24 PROCEDURE — 36415 COLL VENOUS BLD VENIPUNCTURE: CPT | Performed by: INTERNAL MEDICINE

## 2023-04-24 PROCEDURE — 1160F PR REVIEW ALL MEDS BY PRESCRIBER/CLIN PHARMACIST DOCUMENTED: ICD-10-PCS | Mod: CPTII,S$GLB,, | Performed by: INTERNAL MEDICINE

## 2023-04-24 PROCEDURE — 3079F PR MOST RECENT DIASTOLIC BLOOD PRESSURE 80-89 MM HG: ICD-10-PCS | Mod: CPTII,S$GLB,, | Performed by: INTERNAL MEDICINE

## 2023-04-24 PROCEDURE — 3074F SYST BP LT 130 MM HG: CPT | Mod: CPTII,S$GLB,, | Performed by: INTERNAL MEDICINE

## 2023-04-24 PROCEDURE — 1160F RVW MEDS BY RX/DR IN RCRD: CPT | Mod: CPTII,S$GLB,, | Performed by: INTERNAL MEDICINE

## 2023-04-24 PROCEDURE — 1159F PR MEDICATION LIST DOCUMENTED IN MEDICAL RECORD: ICD-10-PCS | Mod: CPTII,S$GLB,, | Performed by: INTERNAL MEDICINE

## 2023-04-24 PROCEDURE — 99396 PR PREVENTIVE VISIT,EST,40-64: ICD-10-PCS | Mod: S$GLB,,, | Performed by: INTERNAL MEDICINE

## 2023-04-24 PROCEDURE — 3008F PR BODY MASS INDEX (BMI) DOCUMENTED: ICD-10-PCS | Mod: CPTII,S$GLB,, | Performed by: INTERNAL MEDICINE

## 2023-04-24 PROCEDURE — 3079F DIAST BP 80-89 MM HG: CPT | Mod: CPTII,S$GLB,, | Performed by: INTERNAL MEDICINE

## 2023-04-24 PROCEDURE — 3008F BODY MASS INDEX DOCD: CPT | Mod: CPTII,S$GLB,, | Performed by: INTERNAL MEDICINE

## 2023-04-24 PROCEDURE — 3074F PR MOST RECENT SYSTOLIC BLOOD PRESSURE < 130 MM HG: ICD-10-PCS | Mod: CPTII,S$GLB,, | Performed by: INTERNAL MEDICINE

## 2023-04-24 PROCEDURE — 4010F PR ACE/ARB THEARPY RXD/TAKEN: ICD-10-PCS | Mod: CPTII,S$GLB,, | Performed by: INTERNAL MEDICINE

## 2023-04-24 PROCEDURE — 1159F MED LIST DOCD IN RCRD: CPT | Mod: CPTII,S$GLB,, | Performed by: INTERNAL MEDICINE

## 2023-04-24 NOTE — PROGRESS NOTES
"    CHIEF COMPLAINT     Chief Complaint   Patient presents with    Hypertension       HPI     Chasity Kwan is a 43 y.o. female hypertension hyperlipidemia,anxiety high BMI with history of bariatric surgery here today for annual exam.    Reports BP high at her gyn appointment.  Checks her blood pressure daily less than 140/90    High BMI  Previously tried to start Wegovy at last visit.  Her insurance is not covering.  She is status post partial gastrectomy    Follows with behaviorist for Anxiety  On lexapro 20, PRN klonopin, prn propanolol with HS ambien. Feels like symptoms well controlled, denies oversedation with meds.    Personally Reviewed Patient's Medical, surgical, family and social hx. Changes updated in [employer].  Care Team updated in Epic    Review of Systems:  Review of Systems   Respiratory:  Negative for cough and shortness of breath.    Musculoskeletal:         Shoulder pain/ R trap pain     Health Maintenance:   Reviewed with patient  Due for the following:      PHYSICAL EXAM     /86 (BP Location: Right arm, Patient Position: Sitting, BP Method: Large (Manual))   Pulse 85   Ht 4' 11" (1.499 m)   Wt 91.6 kg (202 lb)   SpO2 98%   BMI 40.80 kg/m²     Gen: Well Appearing, NAD  HEENT: PERR, EOMI  Neck: FROM, no thyromegaly, no cervical adenopathy  CVD: RRR, no M/R/G  Pulm: Normal work of breathing, CTAB, no wheezing  Abd:  Soft, NT, ND non TTP, no mass  MSK: no LE edema  Neuro: A&Ox3, gait normal, speech normal  Mood; Mood normal, behavior normal, thought process linear       LABS     Labs reviewed; Notable for    ASSESSMENT     1. Annual physical exam  CBC Auto Differential    Comprehensive Metabolic Panel    Hemoglobin A1C    Lipid Panel      2. BMI 40.0-44.9, adult        3. Primary hypertension        4. Hyperlipidemia, unspecified hyperlipidemia type                Plan     Chasity Kwan is a 43 y.o. female with hypertension hyperlipidemia,anxiety high BMI with " history of bariatric surgery here today for annual exam.  1. Annual physical exam  Updated problem list, medical history, care team and discussed HM.     - CBC Auto Differential; Future  - Comprehensive Metabolic Panel; Future  - Hemoglobin A1C; Future  - Lipid Panel; Future    2. BMI 40.0-44.9, adult  Discussed trial of G LP 1 agonist.  Without insurance coverage discussed using local compounding pharmacy.  She will think about reach out to me.    3. Primary hypertension  At goal continue losartan 25 mg    4. Hyperlipidemia, unspecified hyperlipidemia type  Will recheck lipid panel today and titrate rosuvastatin as needed    Odilon Alas MD

## 2023-04-25 ENCOUNTER — LAB VISIT (OUTPATIENT)
Dept: LAB | Facility: OTHER | Age: 43
End: 2023-04-25
Attending: INTERNAL MEDICINE
Payer: COMMERCIAL

## 2023-04-25 DIAGNOSIS — Z00.00 ANNUAL PHYSICAL EXAM: ICD-10-CM

## 2023-04-25 LAB
ALBUMIN SERPL BCP-MCNC: 3.2 G/DL (ref 3.5–5.2)
ALP SERPL-CCNC: 54 U/L (ref 55–135)
ALT SERPL W/O P-5'-P-CCNC: 15 U/L (ref 10–44)
ANION GAP SERPL CALC-SCNC: 8 MMOL/L (ref 8–16)
AST SERPL-CCNC: 15 U/L (ref 10–40)
BASOPHILS # BLD AUTO: 0.02 K/UL (ref 0–0.2)
BASOPHILS NFR BLD: 0.3 % (ref 0–1.9)
BILIRUB SERPL-MCNC: 0.3 MG/DL (ref 0.1–1)
BUN SERPL-MCNC: 15 MG/DL (ref 6–20)
CALCIUM SERPL-MCNC: 8.8 MG/DL (ref 8.7–10.5)
CHLORIDE SERPL-SCNC: 107 MMOL/L (ref 95–110)
CHOLEST SERPL-MCNC: 250 MG/DL (ref 120–199)
CHOLEST/HDLC SERPL: 2.3 {RATIO} (ref 2–5)
CO2 SERPL-SCNC: 27 MMOL/L (ref 23–29)
CREAT SERPL-MCNC: 0.8 MG/DL (ref 0.5–1.4)
DIFFERENTIAL METHOD: NORMAL
EOSINOPHIL # BLD AUTO: 0.2 K/UL (ref 0–0.5)
EOSINOPHIL NFR BLD: 3.3 % (ref 0–8)
ERYTHROCYTE [DISTWIDTH] IN BLOOD BY AUTOMATED COUNT: 13 % (ref 11.5–14.5)
EST. GFR  (NO RACE VARIABLE): >60 ML/MIN/1.73 M^2
ESTIMATED AVG GLUCOSE: 105 MG/DL (ref 68–131)
GLUCOSE SERPL-MCNC: 94 MG/DL (ref 70–110)
HBA1C MFR BLD: 5.3 % (ref 4–5.6)
HCT VFR BLD AUTO: 37 % (ref 37–48.5)
HDLC SERPL-MCNC: 111 MG/DL (ref 40–75)
HDLC SERPL: 44.4 % (ref 20–50)
HGB BLD-MCNC: 12 G/DL (ref 12–16)
IMM GRANULOCYTES # BLD AUTO: 0.01 K/UL (ref 0–0.04)
IMM GRANULOCYTES NFR BLD AUTO: 0.1 % (ref 0–0.5)
LDLC SERPL CALC-MCNC: 115.8 MG/DL (ref 63–159)
LYMPHOCYTES # BLD AUTO: 1.8 K/UL (ref 1–4.8)
LYMPHOCYTES NFR BLD: 25.3 % (ref 18–48)
MCH RBC QN AUTO: 29.1 PG (ref 27–31)
MCHC RBC AUTO-ENTMCNC: 32.4 G/DL (ref 32–36)
MCV RBC AUTO: 90 FL (ref 82–98)
MONOCYTES # BLD AUTO: 0.6 K/UL (ref 0.3–1)
MONOCYTES NFR BLD: 8.2 % (ref 4–15)
NEUTROPHILS # BLD AUTO: 4.4 K/UL (ref 1.8–7.7)
NEUTROPHILS NFR BLD: 62.8 % (ref 38–73)
NONHDLC SERPL-MCNC: 139 MG/DL
NRBC BLD-RTO: 0 /100 WBC
PLATELET # BLD AUTO: 274 K/UL (ref 150–450)
PMV BLD AUTO: 11.4 FL (ref 9.2–12.9)
POTASSIUM SERPL-SCNC: 4.2 MMOL/L (ref 3.5–5.1)
PROT SERPL-MCNC: 7.1 G/DL (ref 6–8.4)
RBC # BLD AUTO: 4.13 M/UL (ref 4–5.4)
SODIUM SERPL-SCNC: 142 MMOL/L (ref 136–145)
TRIGL SERPL-MCNC: 116 MG/DL (ref 30–150)
WBC # BLD AUTO: 6.97 K/UL (ref 3.9–12.7)

## 2023-04-25 PROCEDURE — 83036 HEMOGLOBIN GLYCOSYLATED A1C: CPT | Performed by: INTERNAL MEDICINE

## 2023-04-25 PROCEDURE — 36415 COLL VENOUS BLD VENIPUNCTURE: CPT | Performed by: INTERNAL MEDICINE

## 2023-04-25 PROCEDURE — 80053 COMPREHEN METABOLIC PANEL: CPT | Performed by: INTERNAL MEDICINE

## 2023-04-25 PROCEDURE — 80061 LIPID PANEL: CPT | Performed by: INTERNAL MEDICINE

## 2023-04-25 PROCEDURE — 85025 COMPLETE CBC W/AUTO DIFF WBC: CPT | Performed by: INTERNAL MEDICINE

## 2023-05-04 ENCOUNTER — PATIENT MESSAGE (OUTPATIENT)
Dept: INTERNAL MEDICINE | Facility: CLINIC | Age: 43
End: 2023-05-04
Payer: COMMERCIAL

## 2023-05-05 ENCOUNTER — PATIENT MESSAGE (OUTPATIENT)
Dept: INTERNAL MEDICINE | Facility: CLINIC | Age: 43
End: 2023-05-05
Payer: COMMERCIAL

## 2023-05-05 ENCOUNTER — TELEPHONE (OUTPATIENT)
Dept: INTERNAL MEDICINE | Facility: CLINIC | Age: 43
End: 2023-05-05
Payer: COMMERCIAL

## 2023-05-05 NOTE — TELEPHONE ENCOUNTER
----- Message from Odilon Alas MD sent at 5/5/2023 10:47 AM CDT -----  Lipids still not at goal, would like to increase rosuvastatin to 40mg if patient has been taking 20mg consistently.    Odilon Alas

## 2023-06-27 ENCOUNTER — PATIENT MESSAGE (OUTPATIENT)
Dept: INTERNAL MEDICINE | Facility: CLINIC | Age: 43
End: 2023-06-27
Payer: COMMERCIAL

## 2023-06-27 DIAGNOSIS — E78.5 HYPERLIPIDEMIA, UNSPECIFIED HYPERLIPIDEMIA TYPE: ICD-10-CM

## 2023-06-28 RX ORDER — ROSUVASTATIN CALCIUM 40 MG/1
40 TABLET, COATED ORAL DAILY
Qty: 90 TABLET | Refills: 3 | Status: SHIPPED | OUTPATIENT
Start: 2023-06-28 | End: 2023-08-06 | Stop reason: SDUPTHER

## 2023-06-28 NOTE — TELEPHONE ENCOUNTER
No care due was identified.  [facility] Embedded Care Due Messages. Reference number: [id number].   6/28/2023 6:48:20 AM CDT

## 2023-07-13 ENCOUNTER — OFFICE VISIT (OUTPATIENT)
Dept: SPINE | Facility: CLINIC | Age: 43
End: 2023-07-13
Payer: COMMERCIAL

## 2023-07-13 DIAGNOSIS — R68.89 DECREASED FUNCTIONAL ACTIVITY TOLERANCE: ICD-10-CM

## 2023-07-13 DIAGNOSIS — G89.4 CHRONIC PAIN DISORDER: ICD-10-CM

## 2023-07-13 DIAGNOSIS — G89.29 CHRONIC RIGHT SHOULDER PAIN: ICD-10-CM

## 2023-07-13 DIAGNOSIS — M79.18 MYOFASCIAL PAIN ON RIGHT SIDE: Primary | ICD-10-CM

## 2023-07-13 DIAGNOSIS — M25.511 CHRONIC RIGHT SHOULDER PAIN: ICD-10-CM

## 2023-07-13 PROCEDURE — 99213 PR OFFICE/OUTPT VISIT, EST, LEVL III, 20-29 MIN: ICD-10-PCS | Mod: 95,,, | Performed by: ANESTHESIOLOGY

## 2023-07-13 PROCEDURE — 99213 OFFICE O/P EST LOW 20 MIN: CPT | Mod: 95,,, | Performed by: ANESTHESIOLOGY

## 2023-07-13 NOTE — PROGRESS NOTES
Chronic Pain-Tele-Medicine-Established Note (Follow up visit)      The patient location is: R upper trapezius area  The chief complaint leading to consultation is: Pain  Visit type: Virtual visit with synchronous audio and video  Total time spent with patient: 25 minutes  Each patient to whom he or she provides medical services by telemedicine is:  (1) informed of the relationship between the physician and patient and the respective role of any other health care provider with respect to management of the patient; and (2) notified that he or she may decline to receive medical services by telemedicine and may withdraw from such care at any time.    Notes:   Interval History 7/13/2023:    Chasity Kwan presents tele-medicine appointment for a follow-up appointment for R sided pain in the upper trapezius area. Since the last visit, Chasity Kwan states the pain has been worsening. Current pain intensity is 8/10. The pain is in her right upper trapezius area and goes down to elbow but does not go into the hand. She denies any numbness or tingling or weakness. She has had a R shoulder injection, as well as dry needling by PT and she had a trigger point injection in March. The dry needling and trigger point injection brought her pain down to a 3/10 and she is interested in trying them again. Her pain has worsened for the past 3 weeks which she attributes to going on vacation and not being very active and then going back to work. She works for [employer] and uses the computer frequently at her work.     HPI:  Mrs. Kwan is a 41 yo female that presents today for initial evaluation of right neck and shoulder pain . She reports that her pain started 3 weeks; denies any inciting event or trauma. She reports that she did get a shoulder MRI about a year that did show a tear in her rotator cuff. However she reports that this pain is different from her shoulder pain she had a year ago stating that it  more from the neck.      Describes the pain as sharp/achy/ and like a muscle pull in nature and rates her pain a 3/10 in the morning but worsens throughout the day and she rates it a 6/10. Pain is exacerbated by overhead movement and activity. Pain is relieved by heat, Acupuncture, Cyclobenzaprine. She reports that the pain does wake her up from sleep occasionally. She has not been to PT but has been doing HEP.  Denies focal weakness, headaches, blurred vision, dizziness, no dermatomal pattern of numbness/tingling.           Pain Medications:    - Opioids: None  - NSAIDs: PRN aleeve   - Others: PRN flexeril      Physical Therapy/Home Exercise: yes, patient currently  performing HEP     report:  Not applicable    Pain Procedures:   Trigger point injection 3/2023, brought pain down from 8/10 to 3/10      Imaging: MRI shoulder 2021 showing tear of subscapularis tendon    Past Medical History:   Diagnosis Date    Abnormal Pap smear 2009    colpo    Genital herpes in women     H/O: myomectomy     EX lap    History of uterine fibroid     Hyperlipidemia     Hypertension     Morbid obesity     Pseudotumor cerebri      Past Surgical History:   Procedure Laterality Date    COLONOSCOPY N/A 12/12/2022    Procedure: COLONOSCOPY;  Surgeon: Michelle Denton MD;  Location: [facility] ([address] [place] [address]);  Service: Endoscopy;  Laterality: N/A;  instr portal-GT  left vm 12/5 mal    ESOPHAGOGASTRODUODENOSCOPY      GASTRECTOMY      gastric sleeve  2015    LAPAROTOMY      SI joint injection       Social History     Socioeconomic History    Marital status: Single   Occupational History     Employer: [employer]   Tobacco Use    Smoking status: Never    Smokeless tobacco: Never   Substance and Sexual Activity    Alcohol use: Yes     Comment: occasionally    Drug use: No    Sexual activity: Not Currently     Partners: Male     Birth control/protection: OCP     Family History   Problem Relation Age of Onset    Hypertension Mother      Hyperlipidemia Mother     Graves' disease Mother     Obesity Mother     Hyperlipidemia Sister     Obesity Sister     Colon cancer Father 50    Cataracts Father     Cancer Father         colon, liver, and stomach    Heart disease Maternal Grandfather     Stroke Maternal Grandmother     Diabetes Paternal Grandmother     No Known Problems Paternal Grandfather     Breast cancer Neg Hx     Ovarian cancer Neg Hx        Review of patient's allergies indicates:  No Known Allergies    Current Outpatient Medications   Medication Sig    ARIPiprazole (ABILIFY) 2 MG Tab Take 2 mg by mouth.    clonazePAM (KLONOPIN) 0.5 MG tablet Take 0.5 mg by mouth daily as needed.     clonazePAM (KLONOPIN) 1 MG tablet Take 1 mg by mouth 2 (two) times daily as needed.    drospirenone-ethinyl estradioL (TIFFANIE) 3-0.02 mg per tablet Take 1 tablet by mouth once daily.    EScitalopram oxalate (LEXAPRO) 20 MG tablet     GAVILYTE-G 236-22.74-6.74 -5.86 gram suspension SMARTSIG:Milliliter(s) By Mouth    losartan (COZAAR) 25 MG tablet TAKE 1 TABLET EVERY DAY    methylPREDNISolone (MEDROL DOSEPACK) 4 mg tablet follow package directions    multivitamin (THERAGRAN) per tablet Take 1 tablet by mouth once daily. Patient to stop[ 1 week before surgery starting 3/18/15    nystatin (MYCOSTATIN) powder Apply topically 2 (two) times daily.    omeprazole (PRILOSEC) 40 MG capsule Take 1 capsule (40 mg total) by mouth every morning. Open capsule and take contents by mouth    propranoloL (INDERAL) 10 MG tablet     rosuvastatin (CRESTOR) 40 MG Tab Take 1 tablet (40 mg total) by mouth once daily.    sumatriptan (IMITREX) 50 MG tablet Once for severe headache. May repeat once after 2 hours. Do not exceed 3-4 doses in one week.    valACYclovir (VALTREX) 500 MG tablet TAKE 1 TABLET EVERY DAY AS NEEDED    zolpidem (AMBIEN CR) 6.25 MG CR tablet Take 1 tablet by mouth every evening.      No current facility-administered medications for this visit.       REVIEW OF  SYSTEMS:    GENERAL:  No weight loss, malaise or fevers.  HEENT:   No recent changes in vision or hearing  NECK:  Negative for lumps, no difficulty with swallowing.  RESPIRATORY:  Negative for cough, wheezing or shortness of breath, patient denies any recent URI.  CARDIOVASCULAR:  Negative for chest pain, leg swelling or palpitations.  GI:  Negative for abdominal discomfort, blood in stools or black stools or change in bowel habits.  MUSCULOSKELETAL:  See HPI.  SKIN:  Negative for lesions, rash, and itching.  PSYCH:  No mood disorder or recent psychosocial stressors.  Patients sleep is not disturbed secondary to pain.  HEMATOLOGY/LYMPHOLOGY:  Negative for prolonged bleeding, bruising easily or swollen nodes.  Patient is not currently taking any anti-coagulants  NEURO:   No history of headaches, syncope, paralysis, seizures or tremors.  All other reviewed and negative other than HPI.    OBJECTIVE:    General appearance: Well appearing, in no acute distress, alert and oriented x3.  Psych:  Mood and affect appropriate.      ASSESSMENT: 43 y.o. year old female with R upper trapezius pain, consistent with     1. Myofascial pain on right side  Ambulatory referral/consult to Physical/Occupational Therapy      2. Chronic pain disorder        3. Decreased functional activity tolerance        4. Chronic right shoulder pain              PLAN:     - Order placed for PT dry needling  - Will schedule for in office visit for trigger point injection to R trapezius area  - RTC in 4 weeks after the procedure      The above plan and management options were discussed at length with patient. Patient is in agreement with the above and verbalized understanding. It will be communicated with the referring physician via electronic record, fax, or mail.    Isrrael Ayala  [facility] fellow  07/13/2023    I have personally reviewed the encounter with resident/fellow/NP and personally spoke with patient and addressed all questions and concerns. The  encounter was initiated by patient who consented and verbalized understanding to the type of encounter not related to any office visit or other encounter in the past 7 days.    Marcos Orourke MD   7/13/2023

## 2023-07-19 ENCOUNTER — PATIENT MESSAGE (OUTPATIENT)
Dept: SPINE | Facility: CLINIC | Age: 43
End: 2023-07-19
Payer: COMMERCIAL

## 2023-07-31 ENCOUNTER — TELEPHONE (OUTPATIENT)
Dept: INTERNAL MEDICINE | Facility: CLINIC | Age: 43
End: 2023-07-31
Payer: COMMERCIAL

## 2023-07-31 NOTE — TELEPHONE ENCOUNTER
Received a request to obtain a PA for Wegovey although this drug is not in her Epic profile. Drug request for weight loss. Processed request.  Chasity Kwan (Key: [id number])    Your demographic data has been sent to [employer].    Waiting up to 1 minute for a reply...    Note  [employer] sent a letter denying coverage for Wegovy.      On 2/14/2023.

## 2023-08-01 ENCOUNTER — TELEPHONE (OUTPATIENT)
Dept: INTERNAL MEDICINE | Facility: CLINIC | Age: 43
End: 2023-08-01
Payer: COMMERCIAL

## 2023-08-06 DIAGNOSIS — E78.5 HYPERLIPIDEMIA, UNSPECIFIED HYPERLIPIDEMIA TYPE: ICD-10-CM

## 2023-08-06 DIAGNOSIS — I10 ESSENTIAL HYPERTENSION: ICD-10-CM

## 2023-08-06 DIAGNOSIS — G43.809 OTHER MIGRAINE WITHOUT STATUS MIGRAINOSUS, NOT INTRACTABLE: ICD-10-CM

## 2023-08-06 DIAGNOSIS — B00.9 HERPES: ICD-10-CM

## 2023-08-06 DIAGNOSIS — Z30.9 ENCOUNTER FOR CONTRACEPTIVE MANAGEMENT, UNSPECIFIED TYPE: ICD-10-CM

## 2023-08-06 NOTE — TELEPHONE ENCOUNTER
No care due was identified.  [facility] Embedded Care Due Messages. Reference number: [id number].   8/06/2023 11:49:59 AM CDT

## 2023-08-07 RX ORDER — VALACYCLOVIR HYDROCHLORIDE 500 MG/1
TABLET, FILM COATED ORAL
Qty: 60 TABLET | Refills: 8 | Status: SHIPPED | OUTPATIENT
Start: 2023-08-07

## 2023-08-07 RX ORDER — ROSUVASTATIN CALCIUM 40 MG/1
40 TABLET, COATED ORAL DAILY
Qty: 90 TABLET | Refills: 2 | Status: SHIPPED | OUTPATIENT
Start: 2023-08-07

## 2023-08-07 RX ORDER — LOSARTAN POTASSIUM 25 MG/1
25 TABLET ORAL DAILY
Qty: 90 TABLET | Refills: 2 | Status: SHIPPED | OUTPATIENT
Start: 2023-08-07

## 2023-08-07 NOTE — TELEPHONE ENCOUNTER
Refill Decision Note   Chasity Aquiles  is requesting a refill authorization.  Brief Assessment and Rationale for Refill:  Approve     Medication Therapy Plan:         Comments:     Note composed:4:32 PM 08/07/2023

## 2023-08-07 NOTE — TELEPHONE ENCOUNTER
Refill Decision Note   Chasity Aquiles  is requesting a refill authorization.  Brief Assessment and Rationale for Refill:  Approve     Medication Therapy Plan:         Comments:     Note composed:4:43 PM 08/07/2023

## 2023-08-08 ENCOUNTER — TELEPHONE (OUTPATIENT)
Dept: INTERNAL MEDICINE | Facility: CLINIC | Age: 43
End: 2023-08-08
Payer: COMMERCIAL

## 2023-08-08 RX ORDER — DROSPIRENONE AND ETHINYL ESTRADIOL 0.02-3(28)
1 KIT ORAL DAILY
Qty: 84 TABLET | Refills: 2 | Status: SHIPPED | OUTPATIENT
Start: 2023-08-08 | End: 2023-12-18

## 2023-08-15 RX ORDER — SUMATRIPTAN 50 MG/1
TABLET, FILM COATED ORAL
Qty: 12 TABLET | Refills: 0 | Status: SHIPPED | OUTPATIENT
Start: 2023-08-15

## 2023-08-17 ENCOUNTER — OFFICE VISIT (OUTPATIENT)
Dept: INTERNAL MEDICINE | Facility: CLINIC | Age: 43
End: 2023-08-17
Payer: COMMERCIAL

## 2023-08-17 ENCOUNTER — TELEPHONE (OUTPATIENT)
Dept: PAIN MEDICINE | Facility: CLINIC | Age: 43
End: 2023-08-17
Payer: COMMERCIAL

## 2023-08-17 ENCOUNTER — LAB VISIT (OUTPATIENT)
Dept: LAB | Facility: HOSPITAL | Age: 43
End: 2023-08-17
Attending: INTERNAL MEDICINE
Payer: COMMERCIAL

## 2023-08-17 ENCOUNTER — IMMUNIZATION (OUTPATIENT)
Dept: PHARMACY | Facility: CLINIC | Age: 43
End: 2023-08-17
Payer: COMMERCIAL

## 2023-08-17 VITALS
WEIGHT: 220.69 LBS | DIASTOLIC BLOOD PRESSURE: 76 MMHG | HEART RATE: 71 BPM | BODY MASS INDEX: 44.49 KG/M2 | SYSTOLIC BLOOD PRESSURE: 128 MMHG | HEIGHT: 59 IN | OXYGEN SATURATION: 97 %

## 2023-08-17 DIAGNOSIS — Z23 NEED FOR VACCINATION: Primary | ICD-10-CM

## 2023-08-17 DIAGNOSIS — G47.33 OSA ON CPAP: ICD-10-CM

## 2023-08-17 DIAGNOSIS — M79.89 LEG SWELLING: ICD-10-CM

## 2023-08-17 DIAGNOSIS — M79.18 MYOFASCIAL PAIN: Primary | ICD-10-CM

## 2023-08-17 DIAGNOSIS — I10 PRIMARY HYPERTENSION: Primary | ICD-10-CM

## 2023-08-17 LAB
ALBUMIN SERPL BCP-MCNC: 3.2 G/DL (ref 3.5–5.2)
ALP SERPL-CCNC: 50 U/L (ref 55–135)
ALT SERPL W/O P-5'-P-CCNC: 10 U/L (ref 10–44)
ANION GAP SERPL CALC-SCNC: 7 MMOL/L (ref 8–16)
AST SERPL-CCNC: 14 U/L (ref 10–40)
BASOPHILS # BLD AUTO: 0.02 K/UL (ref 0–0.2)
BASOPHILS NFR BLD: 0.3 % (ref 0–1.9)
BILIRUB SERPL-MCNC: 0.2 MG/DL (ref 0.1–1)
BUN SERPL-MCNC: 12 MG/DL (ref 6–20)
CALCIUM SERPL-MCNC: 8.6 MG/DL (ref 8.7–10.5)
CHLORIDE SERPL-SCNC: 104 MMOL/L (ref 95–110)
CO2 SERPL-SCNC: 28 MMOL/L (ref 23–29)
CREAT SERPL-MCNC: 0.7 MG/DL (ref 0.5–1.4)
DIFFERENTIAL METHOD: ABNORMAL
EOSINOPHIL # BLD AUTO: 0.1 K/UL (ref 0–0.5)
EOSINOPHIL NFR BLD: 1.8 % (ref 0–8)
ERYTHROCYTE [DISTWIDTH] IN BLOOD BY AUTOMATED COUNT: 14.6 % (ref 11.5–14.5)
EST. GFR  (NO RACE VARIABLE): >60 ML/MIN/1.73 M^2
GLUCOSE SERPL-MCNC: 86 MG/DL (ref 70–110)
HCT VFR BLD AUTO: 35.5 % (ref 37–48.5)
HGB BLD-MCNC: 11.3 G/DL (ref 12–16)
IMM GRANULOCYTES # BLD AUTO: 0.01 K/UL (ref 0–0.04)
IMM GRANULOCYTES NFR BLD AUTO: 0.1 % (ref 0–0.5)
LYMPHOCYTES # BLD AUTO: 1.3 K/UL (ref 1–4.8)
LYMPHOCYTES NFR BLD: 19.4 % (ref 18–48)
MCH RBC QN AUTO: 28.9 PG (ref 27–31)
MCHC RBC AUTO-ENTMCNC: 31.8 G/DL (ref 32–36)
MCV RBC AUTO: 91 FL (ref 82–98)
MONOCYTES # BLD AUTO: 0.6 K/UL (ref 0.3–1)
MONOCYTES NFR BLD: 8.1 % (ref 4–15)
NEUTROPHILS # BLD AUTO: 4.8 K/UL (ref 1.8–7.7)
NEUTROPHILS NFR BLD: 70.3 % (ref 38–73)
NRBC BLD-RTO: 0 /100 WBC
PLATELET # BLD AUTO: 276 K/UL (ref 150–450)
PMV BLD AUTO: 12.8 FL (ref 9.2–12.9)
POTASSIUM SERPL-SCNC: 4.1 MMOL/L (ref 3.5–5.1)
PROT SERPL-MCNC: 7 G/DL (ref 6–8.4)
RBC # BLD AUTO: 3.91 M/UL (ref 4–5.4)
SODIUM SERPL-SCNC: 139 MMOL/L (ref 136–145)
TSH SERPL DL<=0.005 MIU/L-ACNC: 1.4 UIU/ML (ref 0.4–4)
WBC # BLD AUTO: 6.82 K/UL (ref 3.9–12.7)

## 2023-08-17 PROCEDURE — 3044F PR MOST RECENT HEMOGLOBIN A1C LEVEL <7.0%: ICD-10-PCS | Mod: CPTII,S$GLB,, | Performed by: INTERNAL MEDICINE

## 2023-08-17 PROCEDURE — 80053 COMPREHEN METABOLIC PANEL: CPT | Performed by: INTERNAL MEDICINE

## 2023-08-17 PROCEDURE — 85025 COMPLETE CBC W/AUTO DIFF WBC: CPT | Performed by: INTERNAL MEDICINE

## 2023-08-17 PROCEDURE — 3044F HG A1C LEVEL LT 7.0%: CPT | Mod: CPTII,S$GLB,, | Performed by: INTERNAL MEDICINE

## 2023-08-17 PROCEDURE — 99214 PR OFFICE/OUTPT VISIT, EST, LEVL IV, 30-39 MIN: ICD-10-PCS | Mod: S$GLB,,, | Performed by: INTERNAL MEDICINE

## 2023-08-17 PROCEDURE — 3074F PR MOST RECENT SYSTOLIC BLOOD PRESSURE < 130 MM HG: ICD-10-PCS | Mod: CPTII,S$GLB,, | Performed by: INTERNAL MEDICINE

## 2023-08-17 PROCEDURE — 1159F MED LIST DOCD IN RCRD: CPT | Mod: CPTII,S$GLB,, | Performed by: INTERNAL MEDICINE

## 2023-08-17 PROCEDURE — 84443 ASSAY THYROID STIM HORMONE: CPT | Performed by: INTERNAL MEDICINE

## 2023-08-17 PROCEDURE — 99999 PR PBB SHADOW E&M-EST. PATIENT-LVL V: CPT | Mod: PBBFAC,,, | Performed by: INTERNAL MEDICINE

## 2023-08-17 PROCEDURE — 1159F PR MEDICATION LIST DOCUMENTED IN MEDICAL RECORD: ICD-10-PCS | Mod: CPTII,S$GLB,, | Performed by: INTERNAL MEDICINE

## 2023-08-17 PROCEDURE — 3074F SYST BP LT 130 MM HG: CPT | Mod: CPTII,S$GLB,, | Performed by: INTERNAL MEDICINE

## 2023-08-17 PROCEDURE — 3078F DIAST BP <80 MM HG: CPT | Mod: CPTII,S$GLB,, | Performed by: INTERNAL MEDICINE

## 2023-08-17 PROCEDURE — 4010F ACE/ARB THERAPY RXD/TAKEN: CPT | Mod: CPTII,S$GLB,, | Performed by: INTERNAL MEDICINE

## 2023-08-17 PROCEDURE — 4010F PR ACE/ARB THEARPY RXD/TAKEN: ICD-10-PCS | Mod: CPTII,S$GLB,, | Performed by: INTERNAL MEDICINE

## 2023-08-17 PROCEDURE — 36415 COLL VENOUS BLD VENIPUNCTURE: CPT | Performed by: INTERNAL MEDICINE

## 2023-08-17 PROCEDURE — 3008F PR BODY MASS INDEX (BMI) DOCUMENTED: ICD-10-PCS | Mod: CPTII,S$GLB,, | Performed by: INTERNAL MEDICINE

## 2023-08-17 PROCEDURE — 99999 PR PBB SHADOW E&M-EST. PATIENT-LVL V: ICD-10-PCS | Mod: PBBFAC,,, | Performed by: INTERNAL MEDICINE

## 2023-08-17 PROCEDURE — 3078F PR MOST RECENT DIASTOLIC BLOOD PRESSURE < 80 MM HG: ICD-10-PCS | Mod: CPTII,S$GLB,, | Performed by: INTERNAL MEDICINE

## 2023-08-17 PROCEDURE — 3008F BODY MASS INDEX DOCD: CPT | Mod: CPTII,S$GLB,, | Performed by: INTERNAL MEDICINE

## 2023-08-17 PROCEDURE — 99214 OFFICE O/P EST MOD 30 MIN: CPT | Mod: S$GLB,,, | Performed by: INTERNAL MEDICINE

## 2023-08-17 NOTE — PROGRESS NOTES
"    CHIEF COMPLAINT     Chief Complaint   Patient presents with    Foot Swelling       HPI     Chasity Kwan is a 43 y.o. female hypertension hyperlipidemia,anxiety high BMI with history of bariatric surgery  here today for     BMI 44  Has gained 20 lbs since last visit. Started mounjaro 1 week ago.  Since then has noticed increased LE edema. Reports swelling gets worse over the course of the day  Compliant with CPAP but hasn't been reevaluated since weight gain and worsening LE edema      Personally Reviewed Patient's Medical, surgical, family and social hx. Changes updated in [facility].  Care Team updated in Epic    Review of Systems:  Review of Systems   Respiratory:  Negative for shortness of breath.    Cardiovascular:  Positive for leg swelling.       Health Maintenance:   Reviewed with patient  Due for the following:      PHYSICAL EXAM     /76 (BP Location: Right arm, Patient Position: Sitting, BP Method: Large (Manual))   Pulse 71   Ht 4' 11" (1.499 m)   Wt 100.1 kg (220 lb 10.9 oz)   SpO2 97%   BMI 44.57 kg/m²     Gen: Well Appearing, NAD  HEENT: PERR, EOMI  Neck: FROM, no thyromegaly, no cervical adenopathy  CVD: RRR, no M/R/G  Pulm: Normal work of breathing, CTAB, no wheezing  Abd:  Soft, NT, ND non TTP, no mass  MSK: moderate non pitting LE edema  Neuro: A&Ox3, gait normal, speech normal  Mood; Mood normal, behavior normal, thought process linear       LABS     Labs reviewed; Notable for    ASSESSMENT     1. Primary hypertension        2. BMI 40.0-44.9, adult        3. Leg swelling  CBC Auto Differential    Comprehensive Metabolic Panel    TSH      4. NASIM on CPAP  Ambulatory referral/consult to Sleep Disorders              Plan     Chasity Kwan is a 43 y.o. female with hypertension hyperlipidemia,anxiety high BMI with history of bariatric surgery   1. Primary hypertension  At goal continue losartan 25    2. BMI 40.0-44.9, adult  Followed by outside physician  Recently " started mounjaro     3. Leg swelling  Suspect combo of weigh gain, undertreated NASIM and venous insufficiency.  Will check lab for reversible causes  Trial compression stockings.  Consider echo if above measures non diagnostic and compression stockings/NASIM treatment do not improve symptoms.  - CBC Auto Differential; Future  - Comprehensive Metabolic Panel; Future  - TSH; Future    4. NASIM on CPAP  Overdue for f/u and provider has left system  - Ambulatory referral/consult to Sleep Disorders; Future      Odilon Alas MD

## 2023-08-18 ENCOUNTER — PATIENT MESSAGE (OUTPATIENT)
Dept: INTERNAL MEDICINE | Facility: CLINIC | Age: 43
End: 2023-08-18
Payer: COMMERCIAL

## 2023-08-29 ENCOUNTER — PATIENT MESSAGE (OUTPATIENT)
Dept: INTERNAL MEDICINE | Facility: CLINIC | Age: 43
End: 2023-08-29
Payer: COMMERCIAL

## 2023-09-06 NOTE — TELEPHONE ENCOUNTER
Called and scheduled appt with pt. Pt confirmed appt   O-Z Plasty Text: The defect edges were debeveled with a #15c scalpel blade.  Given the location of the defect, shape of the defect and the proximity to free margins an O-Z plasty (double transposition flap) was deemed most appropriate.  Using a sterile surgical marker, the appropriate transposition flaps were drawn incorporating the defect and placing the expected incisions within the relaxed skin tension lines where possible.    The area thus outlined was incised deep to adipose tissue with a #15 scalpel blade.  The skin margins were undermined to an appropriate distance in all directions utilizing iris scissors.  Hemostasis was achieved with electrocautery.  The flaps were then transposed into place, one clockwise and the other counterclockwise, and anchored with interrupted buried subcutaneous sutures.

## 2023-09-07 ENCOUNTER — OFFICE VISIT (OUTPATIENT)
Dept: SLEEP MEDICINE | Facility: CLINIC | Age: 43
End: 2023-09-07
Payer: COMMERCIAL

## 2023-09-07 VITALS
BODY MASS INDEX: 44.35 KG/M2 | SYSTOLIC BLOOD PRESSURE: 117 MMHG | HEIGHT: 59 IN | DIASTOLIC BLOOD PRESSURE: 83 MMHG | WEIGHT: 220 LBS | HEART RATE: 83 BPM

## 2023-09-07 DIAGNOSIS — G47.33 OSA (OBSTRUCTIVE SLEEP APNEA): Primary | ICD-10-CM

## 2023-09-07 PROCEDURE — 3079F PR MOST RECENT DIASTOLIC BLOOD PRESSURE 80-89 MM HG: ICD-10-PCS | Mod: CPTII,S$GLB,, | Performed by: PHYSICIAN ASSISTANT

## 2023-09-07 PROCEDURE — 3044F PR MOST RECENT HEMOGLOBIN A1C LEVEL <7.0%: ICD-10-PCS | Mod: CPTII,S$GLB,, | Performed by: PHYSICIAN ASSISTANT

## 2023-09-07 PROCEDURE — 4010F PR ACE/ARB THEARPY RXD/TAKEN: ICD-10-PCS | Mod: CPTII,S$GLB,, | Performed by: PHYSICIAN ASSISTANT

## 2023-09-07 PROCEDURE — 1159F MED LIST DOCD IN RCRD: CPT | Mod: CPTII,S$GLB,, | Performed by: PHYSICIAN ASSISTANT

## 2023-09-07 PROCEDURE — 99999 PR PBB SHADOW E&M-EST. PATIENT-LVL IV: CPT | Mod: PBBFAC,,, | Performed by: PHYSICIAN ASSISTANT

## 2023-09-07 PROCEDURE — 99214 OFFICE O/P EST MOD 30 MIN: CPT | Mod: S$GLB,,, | Performed by: PHYSICIAN ASSISTANT

## 2023-09-07 PROCEDURE — 99214 PR OFFICE/OUTPT VISIT, EST, LEVL IV, 30-39 MIN: ICD-10-PCS | Mod: S$GLB,,, | Performed by: PHYSICIAN ASSISTANT

## 2023-09-07 PROCEDURE — 3008F BODY MASS INDEX DOCD: CPT | Mod: CPTII,S$GLB,, | Performed by: PHYSICIAN ASSISTANT

## 2023-09-07 PROCEDURE — 3079F DIAST BP 80-89 MM HG: CPT | Mod: CPTII,S$GLB,, | Performed by: PHYSICIAN ASSISTANT

## 2023-09-07 PROCEDURE — 1160F RVW MEDS BY RX/DR IN RCRD: CPT | Mod: CPTII,S$GLB,, | Performed by: PHYSICIAN ASSISTANT

## 2023-09-07 PROCEDURE — 3074F SYST BP LT 130 MM HG: CPT | Mod: CPTII,S$GLB,, | Performed by: PHYSICIAN ASSISTANT

## 2023-09-07 PROCEDURE — 3008F PR BODY MASS INDEX (BMI) DOCUMENTED: ICD-10-PCS | Mod: CPTII,S$GLB,, | Performed by: PHYSICIAN ASSISTANT

## 2023-09-07 PROCEDURE — 4010F ACE/ARB THERAPY RXD/TAKEN: CPT | Mod: CPTII,S$GLB,, | Performed by: PHYSICIAN ASSISTANT

## 2023-09-07 PROCEDURE — 3074F PR MOST RECENT SYSTOLIC BLOOD PRESSURE < 130 MM HG: ICD-10-PCS | Mod: CPTII,S$GLB,, | Performed by: PHYSICIAN ASSISTANT

## 2023-09-07 PROCEDURE — 1160F PR REVIEW ALL MEDS BY PRESCRIBER/CLIN PHARMACIST DOCUMENTED: ICD-10-PCS | Mod: CPTII,S$GLB,, | Performed by: PHYSICIAN ASSISTANT

## 2023-09-07 PROCEDURE — 99999 PR PBB SHADOW E&M-EST. PATIENT-LVL IV: ICD-10-PCS | Mod: PBBFAC,,, | Performed by: PHYSICIAN ASSISTANT

## 2023-09-07 PROCEDURE — 1159F PR MEDICATION LIST DOCUMENTED IN MEDICAL RECORD: ICD-10-PCS | Mod: CPTII,S$GLB,, | Performed by: PHYSICIAN ASSISTANT

## 2023-09-07 PROCEDURE — 3044F HG A1C LEVEL LT 7.0%: CPT | Mod: CPTII,S$GLB,, | Performed by: PHYSICIAN ASSISTANT

## 2023-09-07 NOTE — PROGRESS NOTES
Referred by Odilon Alas MD     ESTABLISHED PATIENT VISIT  New to me. Previously evaluated by Dr Brandi Gaspar Di Kwan  is a pleasant 43 y.o. female  with PMH significant for HTN, HLD, GERD, vit D def, vit B def, BMI 44+ s/p partial gastrectomy, anxety, depression, pseudotumor cerebri, NASIM     Here today for : follow up    PLAN last visit 12/29/21:   Ordered sleep study      Since last visit:   Was using nightly until she ran out of supplies. Wants to start using nightly again.  Leg pain and swelling, leg aching in the mornings (2-3 months)  Still talking in sleep (denies sleep walking, injury to self or others)    PAP history   Problems    Mask Nasal pillows   Pressure 5-11cwp   DME HME   Machine age AirSense 11 4/7/22   Download 9/12/23: have not used since may 2023         Past Medical History:   Diagnosis Date    Abnormal Pap smear 2009    colpo    Genital herpes in women     H/O: myomectomy     EX lap    History of uterine fibroid     Hyperlipidemia     Hypertension     Morbid obesity     Pseudotumor cerebri      Patient Active Problem List   Diagnosis    Hyperlipidemia    Hypertension    Right leg pain    Obesity    Anxiety    Vitamin D deficiency disease    Vitamin B12 deficiency    Sacroiliac pain    History of uterine fibroid    GERD (gastroesophageal reflux disease)    History of laparoscopic partial gastrectomy    Fatigue    Chronic right shoulder pain    Decreased functional activity tolerance       Current Outpatient Medications:     ARIPiprazole (ABILIFY) 2 MG Tab, Take 2 mg by mouth., Disp: , Rfl:     clonazePAM (KLONOPIN) 0.5 MG tablet, Take 0.5 mg by mouth daily as needed. , Disp: , Rfl: 1    clonazePAM (KLONOPIN) 1 MG tablet, Take 1 mg by mouth 2 (two) times daily as needed., Disp: , Rfl:     drospirenone-ethinyl estradioL (TIFFANIE) 3-0.02 mg per tablet, Take 1 tablet by mouth once daily., Disp: 84 tablet, Rfl: 2    EScitalopram oxalate (LEXAPRO) 20 MG tablet, , Disp: , Rfl:      "GAVILYTE-G 236-22.74-6.74 -5.86 gram suspension, SMARTSIG:Milliliter(s) By Mouth, Disp: , Rfl:     losartan (COZAAR) 25 MG tablet, Take 1 tablet (25 mg total) by mouth once daily., Disp: 90 tablet, Rfl: 2    methylPREDNISolone (MEDROL DOSEPACK) 4 mg tablet, follow package directions, Disp: , Rfl:     multivitamin (THERAGRAN) per tablet, Take 1 tablet by mouth once daily. Patient to stop[ 1 week before surgery starting 3/18/15, Disp: , Rfl:     propranoloL (INDERAL) 10 MG tablet, , Disp: , Rfl:     rosuvastatin (CRESTOR) 40 MG Tab, Take 1 tablet (40 mg total) by mouth once daily., Disp: 90 tablet, Rfl: 2    sars-cov-2, covid-19 omicron, (MODERNA COVID BIVAL,6M UP,,PF,) 50 mcg/0.5 mL injection, Inject into the muscle., Disp: 0.5 mL, Rfl: 0    sumatriptan (IMITREX) 50 MG tablet, Once for severe headache. May repeat once after 2 hours. Do not exceed 3-4 doses in one week. NO FURTHER REFILL WITHOUT APT, Disp: 12 tablet, Rfl: 0    valACYclovir (VALTREX) 500 MG tablet, TAKE 1 TABLET EVERY DAY AS NEEDED, Disp: 60 tablet, Rfl: 8    zolpidem (AMBIEN CR) 6.25 MG CR tablet, Take 1 tablet by mouth every evening. , Disp: , Rfl: 2    nystatin (MYCOSTATIN) powder, Apply topically 2 (two) times daily., Disp: 30 g, Rfl: 3    omeprazole (PRILOSEC) 40 MG capsule, Take 1 capsule (40 mg total) by mouth every morning. Open capsule and take contents by mouth, Disp: 30 capsule, Rfl: 11       Vitals:    09/07/23 0828   BP: 117/83   BP Location: Left arm   Patient Position: Sitting   BP Method: Medium (Automatic)   Pulse: 83   Weight: 99.8 kg (220 lb)   Height: 4' 11" (1.499 m)     Physical Exam:    GEN:   Well-appearing  Psych:  Appropriate affect, demonstrates insight  SKIN:  No rash on the face or bridge of the nose      LABS:   Lab Results   Component Value Date    HGB 11.3 (L) 08/17/2023    CO2 28 08/17/2023       RECORDS REVIEWED PREVIOUSLY:    1/6/22 HST: AHI 10, RDI 19    ASSESSMENT    [place] Sleepiness Scale:  Sitting and reading: "   0  Watching TV:   0  Passenger in a car x 1 hr:  3  Sitting quietly after lunch:  0  Lying down to rest in PM:  3  Sitting, inactive in public:  0  Sitting+ talking to someone:  0  Stopped in traffic:   0  Total   6/24      PROBLEM DESCRIPTION/ Sx on Presentation  STATUS    NASIM   + snoring, + witnessed apneas   + not rested upon waking  Dx 2021 AHI 10, RDI 19  New to me   Daytime Sx   + sleepiness when inactive   Prone to dozing when sedentary   ESS 10/24 on intake (reviewed from 12/29/21)  New to me   Insomnia   Trouble falling asleep: no  Maintenance:         wakes occasionally, return to sleep is variable   Prior hypnotics:        Current hypnotics: klonopin 1mg BID (anxiety)    New to me   Other issues:     PLAN     -using and benefiting from CPAP until ran out of supplies  -recommended to resume nightly use of CPAP  -CPAP supplies ordered  -discussed NASIM and CPAP with patient in detail, including possible complications of untreated NASIM like heart attack/stroke  -advised on strict driving precautions; advised never to drive drowsy    Advised on plan of care. Answered all patient questions. Patient verbalized understanding and voiced agreement with plan of care.       RTC 3-4 months or as needed       The patient was given open opportunity to ask questions and/or express concerns about treatment plan. All questions/concerns were discussed.     Two patient identifiers used prior to evaluation.

## 2023-09-17 ENCOUNTER — PATIENT MESSAGE (OUTPATIENT)
Dept: INTERNAL MEDICINE | Facility: CLINIC | Age: 43
End: 2023-09-17
Payer: COMMERCIAL

## 2023-09-27 ENCOUNTER — PATIENT MESSAGE (OUTPATIENT)
Dept: OBSTETRICS AND GYNECOLOGY | Facility: CLINIC | Age: 43
End: 2023-09-27
Payer: COMMERCIAL

## 2023-09-27 ENCOUNTER — PATIENT MESSAGE (OUTPATIENT)
Dept: INTERNAL MEDICINE | Facility: CLINIC | Age: 43
End: 2023-09-27
Payer: COMMERCIAL

## 2023-09-28 ENCOUNTER — TELEPHONE (OUTPATIENT)
Dept: OBSTETRICS AND GYNECOLOGY | Facility: CLINIC | Age: 43
End: 2023-09-28
Payer: COMMERCIAL

## 2023-09-28 DIAGNOSIS — N92.6 IRREGULAR BLEEDING: Primary | ICD-10-CM

## 2023-09-28 NOTE — TELEPHONE ENCOUNTER
Would you like be to started on progesterone only birth control pills to try to get you regulated? AP

## 2023-09-28 NOTE — TELEPHONE ENCOUNTER
Camille Butt,     How are you? In August, I saw Dr. Abraham at [facility] and  went off the pill and  took Estrace for about two weeks to test my uterine lining. I went back on the pill when the tests showed a very low chance of conceiving. Ive been bleeding or spotting on and off since then. Im also taking tirezipatide for weight loss. Im just wondering if I need to come in or if theres something you can prescribe to stop the bleeding. The first available appointment is 10/20 and Ive been bleeding for over a month.      Take care,  Stefany Kwan

## 2023-10-03 RX ORDER — ACETAMINOPHEN AND CODEINE PHOSPHATE 120; 12 MG/5ML; MG/5ML
1 SOLUTION ORAL DAILY
Qty: 30 TABLET | Refills: 11 | Status: SHIPPED | OUTPATIENT
Start: 2023-10-03 | End: 2024-10-02

## 2023-10-03 NOTE — TELEPHONE ENCOUNTER
Yes please. Would you please call it into the  [facility] on [address]?     Take care,  Stefany Kwan

## 2023-10-24 ENCOUNTER — HOSPITAL ENCOUNTER (OUTPATIENT)
Dept: RADIOLOGY | Facility: OTHER | Age: 43
Discharge: HOME | End: 2023-10-24
Attending: OBSTETRICS & GYNECOLOGY
Payer: COMMERCIAL

## 2023-10-24 DIAGNOSIS — Z12.31 ENCOUNTER FOR SCREENING MAMMOGRAM FOR BREAST CANCER: ICD-10-CM

## 2023-10-24 PROCEDURE — 77063 BREAST TOMOSYNTHESIS BI: CPT | Mod: 26,,, | Performed by: RADIOLOGY

## 2023-10-24 PROCEDURE — 77067 MAMMO DIGITAL SCREENING BILAT WITH TOMO: ICD-10-PCS | Mod: 26,,, | Performed by: RADIOLOGY

## 2023-10-24 PROCEDURE — 77067 SCR MAMMO BI INCL CAD: CPT | Mod: 26,,, | Performed by: RADIOLOGY

## 2023-10-24 PROCEDURE — 77067 SCR MAMMO BI INCL CAD: CPT | Mod: TC

## 2023-10-24 PROCEDURE — 77063 MAMMO DIGITAL SCREENING BILAT WITH TOMO: ICD-10-PCS | Mod: 26,,, | Performed by: RADIOLOGY

## 2023-12-18 ENCOUNTER — LAB VISIT (OUTPATIENT)
Dept: LAB | Facility: OTHER | Age: 43
End: 2023-12-18
Attending: OBSTETRICS & GYNECOLOGY
Payer: COMMERCIAL

## 2023-12-18 ENCOUNTER — OFFICE VISIT (OUTPATIENT)
Dept: OBSTETRICS AND GYNECOLOGY | Facility: CLINIC | Age: 43
End: 2023-12-18
Payer: COMMERCIAL

## 2023-12-18 VITALS
SYSTOLIC BLOOD PRESSURE: 122 MMHG | DIASTOLIC BLOOD PRESSURE: 80 MMHG | WEIGHT: 194.69 LBS | HEIGHT: 59 IN | BODY MASS INDEX: 39.25 KG/M2

## 2023-12-18 DIAGNOSIS — N76.3 CHRONIC VULVITIS: ICD-10-CM

## 2023-12-18 DIAGNOSIS — Z11.3 SCREEN FOR STD (SEXUALLY TRANSMITTED DISEASE): ICD-10-CM

## 2023-12-18 DIAGNOSIS — Z11.3 SCREEN FOR STD (SEXUALLY TRANSMITTED DISEASE): Primary | ICD-10-CM

## 2023-12-18 LAB
C TRACH DNA SPEC QL NAA+PROBE: NOT DETECTED
HAV IGM SERPL QL IA: NORMAL
HBV CORE IGM SERPL QL IA: NORMAL
HBV SURFACE AG SERPL QL IA: NORMAL
HCV AB SERPL QL IA: NORMAL
HIV 1+2 AB+HIV1 P24 AG SERPL QL IA: NORMAL
N GONORRHOEA DNA SPEC QL NAA+PROBE: NOT DETECTED

## 2023-12-18 PROCEDURE — 99214 OFFICE O/P EST MOD 30 MIN: CPT | Mod: S$GLB,,, | Performed by: OBSTETRICS & GYNECOLOGY

## 2023-12-18 PROCEDURE — 87389 HIV-1 AG W/HIV-1&-2 AB AG IA: CPT | Performed by: OBSTETRICS & GYNECOLOGY

## 2023-12-18 PROCEDURE — 3008F PR BODY MASS INDEX (BMI) DOCUMENTED: ICD-10-PCS | Mod: CPTII,S$GLB,, | Performed by: OBSTETRICS & GYNECOLOGY

## 2023-12-18 PROCEDURE — 3044F PR MOST RECENT HEMOGLOBIN A1C LEVEL <7.0%: ICD-10-PCS | Mod: CPTII,S$GLB,, | Performed by: OBSTETRICS & GYNECOLOGY

## 2023-12-18 PROCEDURE — 99999 PR PBB SHADOW E&M-EST. PATIENT-LVL III: ICD-10-PCS | Mod: PBBFAC,,, | Performed by: OBSTETRICS & GYNECOLOGY

## 2023-12-18 PROCEDURE — 3074F SYST BP LT 130 MM HG: CPT | Mod: CPTII,S$GLB,, | Performed by: OBSTETRICS & GYNECOLOGY

## 2023-12-18 PROCEDURE — 99214 PR OFFICE/OUTPT VISIT, EST, LEVL IV, 30-39 MIN: ICD-10-PCS | Mod: S$GLB,,, | Performed by: OBSTETRICS & GYNECOLOGY

## 2023-12-18 PROCEDURE — 86592 SYPHILIS TEST NON-TREP QUAL: CPT | Performed by: OBSTETRICS & GYNECOLOGY

## 2023-12-18 PROCEDURE — 3008F BODY MASS INDEX DOCD: CPT | Mod: CPTII,S$GLB,, | Performed by: OBSTETRICS & GYNECOLOGY

## 2023-12-18 PROCEDURE — 4010F ACE/ARB THERAPY RXD/TAKEN: CPT | Mod: CPTII,S$GLB,, | Performed by: OBSTETRICS & GYNECOLOGY

## 2023-12-18 PROCEDURE — 36415 COLL VENOUS BLD VENIPUNCTURE: CPT | Performed by: OBSTETRICS & GYNECOLOGY

## 2023-12-18 PROCEDURE — 99999 PR PBB SHADOW E&M-EST. PATIENT-LVL III: CPT | Mod: PBBFAC,,, | Performed by: OBSTETRICS & GYNECOLOGY

## 2023-12-18 PROCEDURE — 3079F PR MOST RECENT DIASTOLIC BLOOD PRESSURE 80-89 MM HG: ICD-10-PCS | Mod: CPTII,S$GLB,, | Performed by: OBSTETRICS & GYNECOLOGY

## 2023-12-18 PROCEDURE — 4010F PR ACE/ARB THEARPY RXD/TAKEN: ICD-10-PCS | Mod: CPTII,S$GLB,, | Performed by: OBSTETRICS & GYNECOLOGY

## 2023-12-18 PROCEDURE — 1160F RVW MEDS BY RX/DR IN RCRD: CPT | Mod: CPTII,S$GLB,, | Performed by: OBSTETRICS & GYNECOLOGY

## 2023-12-18 PROCEDURE — 3044F HG A1C LEVEL LT 7.0%: CPT | Mod: CPTII,S$GLB,, | Performed by: OBSTETRICS & GYNECOLOGY

## 2023-12-18 PROCEDURE — 1159F PR MEDICATION LIST DOCUMENTED IN MEDICAL RECORD: ICD-10-PCS | Mod: CPTII,S$GLB,, | Performed by: OBSTETRICS & GYNECOLOGY

## 2023-12-18 PROCEDURE — 80074 ACUTE HEPATITIS PANEL: CPT | Performed by: OBSTETRICS & GYNECOLOGY

## 2023-12-18 PROCEDURE — 1160F PR REVIEW ALL MEDS BY PRESCRIBER/CLIN PHARMACIST DOCUMENTED: ICD-10-PCS | Mod: CPTII,S$GLB,, | Performed by: OBSTETRICS & GYNECOLOGY

## 2023-12-18 PROCEDURE — 3079F DIAST BP 80-89 MM HG: CPT | Mod: CPTII,S$GLB,, | Performed by: OBSTETRICS & GYNECOLOGY

## 2023-12-18 PROCEDURE — 87491 CHLMYD TRACH DNA AMP PROBE: CPT | Performed by: OBSTETRICS & GYNECOLOGY

## 2023-12-18 PROCEDURE — 3074F PR MOST RECENT SYSTOLIC BLOOD PRESSURE < 130 MM HG: ICD-10-PCS | Mod: CPTII,S$GLB,, | Performed by: OBSTETRICS & GYNECOLOGY

## 2023-12-18 PROCEDURE — 1159F MED LIST DOCD IN RCRD: CPT | Mod: CPTII,S$GLB,, | Performed by: OBSTETRICS & GYNECOLOGY

## 2023-12-18 PROCEDURE — 81514 NFCT DS BV&VAGINITIS DNA ALG: CPT | Performed by: OBSTETRICS & GYNECOLOGY

## 2023-12-18 RX ORDER — FLUCONAZOLE 150 MG/1
150 TABLET ORAL ONCE AS NEEDED
Qty: 2 TABLET | Refills: 2 | Status: SHIPPED | OUTPATIENT
Start: 2023-12-18 | End: 2023-12-18

## 2023-12-18 RX ORDER — CLOTRIMAZOLE AND BETAMETHASONE DIPROPIONATE 10; .64 MG/G; MG/G
CREAM TOPICAL 2 TIMES DAILY
Qty: 45 G | Refills: 3 | Status: SHIPPED | OUTPATIENT
Start: 2023-12-18

## 2023-12-18 NOTE — PROGRESS NOTES
"CC:  STD screen   Vulvar / groin krupa    Chasity Kwan is a 43 y.o. female  presents for recurrent vulvar/ groin candida   She would also like STD testing.    Past Medical History:   Diagnosis Date    Abnormal Pap smear 2009    colpo    Genital herpes in women     H/O: myomectomy     EX lap    History of uterine fibroid     Hyperlipidemia     Hypertension     Morbid obesity     Pseudotumor cerebri        Past Surgical History:   Procedure Laterality Date    COLONOSCOPY N/A 2022    Procedure: COLONOSCOPY;  Surgeon: Michelle Denton MD;  Location: [facility] ([address] [place] [address]);  Service: Endoscopy;  Laterality: N/A;  instr portal-GT  left vm  mal    ESOPHAGOGASTRODUODENOSCOPY      GASTRECTOMY      gastric sleeve  2015    LAPAROTOMY      SI joint injection         OB History    Para Term  AB Living   0 0 0 0 0 0   SAB IAB Ectopic Multiple Live Births   0 0 0 0         Family History   Problem Relation Age of Onset    Hypertension Mother     Hyperlipidemia Mother     Graves' disease Mother     Obesity Mother     Hyperlipidemia Sister     Obesity Sister     Colon cancer Father 50    Cataracts Father     Cancer Father         colon, liver, and stomach    Heart disease Maternal Grandfather     Stroke Maternal Grandmother     Diabetes Paternal Grandmother     No Known Problems Paternal Grandfather     Breast cancer Neg Hx     Ovarian cancer Neg Hx        Social History     Tobacco Use    Smoking status: Never    Smokeless tobacco: Never   Substance Use Topics    Alcohol use: Yes     Comment: occasionally    Drug use: No       /80   Ht 4' 11" (1.499 m)   Wt 88.3 kg (194 lb 10.7 oz)   LMP  (LMP Unknown) Comment: no cycle with OCP  BMI 39.32 kg/m²     ROS:  GENERAL: Denies weight gain or weight loss. Feeling well overall.   SKIN: Denies rash or lesions.   HEAD: Denies head injury or headache.   NODES: Denies enlarged lymph nodes.   CHEST: Denies chest pain or shortness of " breath.   CARDIOVASCULAR: Denies palpitations or left sided chest pain.   ABDOMEN: No abdominal pain, constipation, diarrhea, nausea, vomiting or rectal bleeding.   URINARY: No frequency, dysuria, hematuria, or burning on urination.  REPRODUCTIVE: See HPI.   BREASTS: The patient performs breast self-examination and denies pain, lumps, or nipple discharge.   HEMATOLOGIC: No easy bruisability or excessive bleeding.  MUSCULOSKELETAL: Denies joint pain or swelling.   NEUROLOGIC: Denies syncope or weakness.   PSYCHIATRIC: Denies depression, anxiety or mood swings.    Physical Exam:    APPEARANCE: Well nourished, well developed, in no acute distress.  AFFECT: WNL, alert and oriented x 3  SKIN: No acne or hirsutism  NECK: Neck symmetric without masses or thyromegaly  NODES: No inguinal, cervical, axillary, or femoral lymph node enlargement  CHEST: Good respiratory effect  ABDOMEN: Soft.  No tenderness or masses.  No hepatosplenomegaly.  No hernias.  PELVIC: Normal external genitalia without lesions.  Normal hair distribution.  Adequate perineal body, normal urethral meatus.  Vagina moist and well rugated without lesions, some white discharge.  Cervix pink, without lesions, discharge or tenderness.  No significant cystocele or rectocele.  Bimanual exam shows uterus to be normal size, regular, mobile and nontender.  Adnexa without masses or tenderness.    EXTREMITIES: No edema.    ASSESSMENT AND PLAN  1. Screen for STD (sexually transmitted disease)  HIV 1/2 Ag/Ab (4th Gen)    RPR    HEPATITIS PANEL, ACUTE    C. trachomatis/N. gonorrhoeae by AMP DNA    Vaginosis Screen by DNA Probe      2. Chronic vulvitis  clotrimazole-betamethasone 1-0.05% (LOTRISONE) cream    fluconazole (DIFLUCAN) 150 MG Tab          Patient was counseled today on A.C.S. Pap guidelines and recommendations for yearly pelvic exams, mammograms and monthly self breast exams; to see her PCP for other health maintenance.     Follow up if symptoms worsen or  fail to improve.

## 2023-12-19 LAB
BACTERIAL VAGINOSIS DNA: NEGATIVE
CANDIDA GLABRATA DNA: NEGATIVE
CANDIDA KRUSEI DNA: NEGATIVE
CANDIDA RRNA VAG QL PROBE: NEGATIVE
RPR SER QL: NORMAL
T VAGINALIS RRNA GENITAL QL PROBE: NEGATIVE

## 2024-02-07 ENCOUNTER — PATIENT MESSAGE (OUTPATIENT)
Dept: INTERNAL MEDICINE | Facility: CLINIC | Age: 44
End: 2024-02-07
Payer: COMMERCIAL

## 2024-03-20 ENCOUNTER — PATIENT MESSAGE (OUTPATIENT)
Dept: INTERNAL MEDICINE | Facility: CLINIC | Age: 44
End: 2024-03-20
Payer: COMMERCIAL

## 2024-04-02 ENCOUNTER — OFFICE VISIT (OUTPATIENT)
Dept: INTERNAL MEDICINE | Facility: CLINIC | Age: 44
End: 2024-04-02
Payer: COMMERCIAL

## 2024-04-02 ENCOUNTER — HOSPITAL ENCOUNTER (OUTPATIENT)
Dept: RADIOLOGY | Facility: HOSPITAL | Age: 44
Discharge: HOME | End: 2024-04-02
Attending: STUDENT IN AN ORGANIZED HEALTH CARE EDUCATION/TRAINING PROGRAM
Payer: COMMERCIAL

## 2024-04-02 VITALS
BODY MASS INDEX: 36.44 KG/M2 | HEIGHT: 59 IN | SYSTOLIC BLOOD PRESSURE: 102 MMHG | OXYGEN SATURATION: 99 % | HEART RATE: 77 BPM | DIASTOLIC BLOOD PRESSURE: 70 MMHG | WEIGHT: 180.75 LBS

## 2024-04-02 DIAGNOSIS — Z01.818 PRE-OP EVALUATION: ICD-10-CM

## 2024-04-02 DIAGNOSIS — Z01.818 PRE-OP EVALUATION: Primary | ICD-10-CM

## 2024-04-02 PROCEDURE — 71046 X-RAY EXAM CHEST 2 VIEWS: CPT | Mod: TC

## 2024-04-02 PROCEDURE — 93005 ELECTROCARDIOGRAM TRACING: CPT | Mod: S$GLB,,, | Performed by: STUDENT IN AN ORGANIZED HEALTH CARE EDUCATION/TRAINING PROGRAM

## 2024-04-02 PROCEDURE — 4010F ACE/ARB THERAPY RXD/TAKEN: CPT | Mod: CPTII,S$GLB,, | Performed by: STUDENT IN AN ORGANIZED HEALTH CARE EDUCATION/TRAINING PROGRAM

## 2024-04-02 PROCEDURE — 1159F MED LIST DOCD IN RCRD: CPT | Mod: CPTII,S$GLB,, | Performed by: STUDENT IN AN ORGANIZED HEALTH CARE EDUCATION/TRAINING PROGRAM

## 2024-04-02 PROCEDURE — 1160F RVW MEDS BY RX/DR IN RCRD: CPT | Mod: CPTII,S$GLB,, | Performed by: STUDENT IN AN ORGANIZED HEALTH CARE EDUCATION/TRAINING PROGRAM

## 2024-04-02 PROCEDURE — 3074F SYST BP LT 130 MM HG: CPT | Mod: CPTII,S$GLB,, | Performed by: STUDENT IN AN ORGANIZED HEALTH CARE EDUCATION/TRAINING PROGRAM

## 2024-04-02 PROCEDURE — 93010 ELECTROCARDIOGRAM REPORT: CPT | Mod: S$GLB,,, | Performed by: INTERNAL MEDICINE

## 2024-04-02 PROCEDURE — 99999 PR PBB SHADOW E&M-EST. PATIENT-LVL V: CPT | Mod: PBBFAC,,, | Performed by: STUDENT IN AN ORGANIZED HEALTH CARE EDUCATION/TRAINING PROGRAM

## 2024-04-02 PROCEDURE — 99213 OFFICE O/P EST LOW 20 MIN: CPT | Mod: S$GLB,,, | Performed by: STUDENT IN AN ORGANIZED HEALTH CARE EDUCATION/TRAINING PROGRAM

## 2024-04-02 PROCEDURE — 3044F HG A1C LEVEL LT 7.0%: CPT | Mod: CPTII,S$GLB,, | Performed by: STUDENT IN AN ORGANIZED HEALTH CARE EDUCATION/TRAINING PROGRAM

## 2024-04-02 PROCEDURE — 3078F DIAST BP <80 MM HG: CPT | Mod: CPTII,S$GLB,, | Performed by: STUDENT IN AN ORGANIZED HEALTH CARE EDUCATION/TRAINING PROGRAM

## 2024-04-02 PROCEDURE — 3008F BODY MASS INDEX DOCD: CPT | Mod: CPTII,S$GLB,, | Performed by: STUDENT IN AN ORGANIZED HEALTH CARE EDUCATION/TRAINING PROGRAM

## 2024-04-02 PROCEDURE — 71046 X-RAY EXAM CHEST 2 VIEWS: CPT | Mod: 26,,, | Performed by: RADIOLOGY

## 2024-04-02 NOTE — PATIENT INSTRUCTIONS
Chest x-ray has been ordered - please complete at earliest convenience.     Once I have results of chest x-ray and cardiologist read of EKG - I will put letter of clearance into your chart which you can access with [facility].

## 2024-04-02 NOTE — LETTER
April 3, 2024    Chasity Di Aquiles  [address] [facility] [place] [address]             Maurice Mishra [facility]  [address]  [facility] [address]  Phone: [phone]  Fax: [phone] To whom it may concern:    The above patient was evaluated by me on 04/02/2024. EKG and Chest x-ray were completed with results as detailed in attachment.     RCRI score is 3.9% 30 day risk of death, MI, or cardiac arrest; indicating low risk for surgery.    If you have any questions or concerns, please don't hesitate to call.    Sincerely,        Maris Bacon MD

## 2024-04-02 NOTE — PROGRESS NOTES
[facility]      CHIEF COMPLAINT:   Chief Complaint   Patient presents with    Pre-op Exam     Pt states she needs an EKG and chest xray in order for her to go have surgery.       HISTORY OF PRESENT ILLNESS: Chasity Kwan is a 44 y.o. female who presents here today for pre-op evaluation. She is planning to have gastric bypass surgery. Her surgeon is requesting EKG & chest x-ray prior to having the surgery.       REVIEW OF SYSTEMS:    Review of Systems   HENT:  Negative for hearing loss.    Eyes:  Negative for discharge.   Respiratory:  Negative for wheezing.    Cardiovascular:  Negative for chest pain and palpitations.   Gastrointestinal:  Negative for blood in stool, constipation, diarrhea and vomiting.   Genitourinary:  Negative for dysuria and hematuria.   Musculoskeletal:  Negative for neck pain.   Neurological:  Negative for weakness and headaches.   Endo/Heme/Allergies:  Negative for polydipsia.     Answers submitted by the patient for this visit:  Review of Systems Questionnaire (Submitted on 4/2/2024)  activity change: No  unexpected weight change: No  rhinorrhea: No  trouble swallowing: No  visual disturbance: No  chest tightness: No  polyuria: No  difficulty urinating: No  menstrual problem: No  joint swelling: No  arthralgias: No  confusion: No  dysphoric mood: No      MEDICAL HISTORY:    Past Medical History:   Diagnosis Date    Abnormal Pap smear 2009    colpo    Genital herpes in women     H/O: myomectomy     EX lap    History of uterine fibroid     Hyperlipidemia     Hypertension     Morbid obesity     Pseudotumor cerebri        MEDICATIONS:    Current Outpatient Medications on File Prior to Visit   Medication Sig Dispense Refill    ARIPiprazole (ABILIFY) 2 MG Tab Take 2 mg by mouth.      clonazePAM (KLONOPIN) 1 MG tablet Take 1 mg by mouth 2 (two) times daily as needed.      clotrimazole-betamethasone 1-0.05% (LOTRISONE) cream Apply topically 2 (two) times daily. 45 g 3     "EScitalopram oxalate (LEXAPRO) 20 MG tablet       losartan (COZAAR) 25 MG tablet Take 1 tablet (25 mg total) by mouth once daily. 90 tablet 2    multivitamin (THERAGRAN) per tablet Take 1 tablet by mouth once daily. Patient to stop[ 1 week before surgery starting 3/18/15      norethindrone (MICRONOR) 0.35 mg tablet Take 1 tablet (0.35 mg total) by mouth once daily. 30 tablet 11    propranoloL (INDERAL) 10 MG tablet       rosuvastatin (CRESTOR) 40 MG Tab Take 1 tablet (40 mg total) by mouth once daily. 90 tablet 2    sumatriptan (IMITREX) 50 MG tablet Once for severe headache. May repeat once after 2 hours. Do not exceed 3-4 doses in one week. NO FURTHER REFILL WITHOUT APT 12 tablet 0    tirzepatide 10 mg/0.5 mL PnIj Inject 10 mg into the skin every 7 days.      valACYclovir (VALTREX) 500 MG tablet TAKE 1 TABLET EVERY DAY AS NEEDED 60 tablet 8    zolpidem (AMBIEN CR) 6.25 MG CR tablet Take 1 tablet by mouth every evening.   2    omeprazole (PRILOSEC) 40 MG capsule Take 1 capsule (40 mg total) by mouth every morning. Open capsule and take contents by mouth 30 capsule 11     No current facility-administered medications on file prior to visit.       PHYSICAL EXAM:    /70 (BP Location: Left arm, Patient Position: Sitting)   Pulse 77   Ht 4' 11" (1.499 m)   Wt 82 kg (180 lb 12.4 oz)   SpO2 99%   BMI 36.51 kg/m²     Physical Exam  Vitals and nursing note reviewed.   Constitutional:       General: She is not in acute distress.     Appearance: Normal appearance. She is not ill-appearing, toxic-appearing or diaphoretic.   HENT:      Head: Normocephalic and atraumatic.      Nose: Nose normal.   Eyes:      Extraocular Movements: Extraocular movements intact.      Conjunctiva/sclera: Conjunctivae normal.      Pupils: Pupils are equal, round, and reactive to light.   Cardiovascular:      Rate and Rhythm: Normal rate and regular rhythm.      Heart sounds: Normal heart sounds. No murmur heard.  Pulmonary:      Effort: " Pulmonary effort is normal. No respiratory distress.      Breath sounds: Normal breath sounds. No wheezing.   Musculoskeletal:         General: No deformity. Normal range of motion.   Skin:     Findings: No lesion or rash.   Neurological:      General: No focal deficit present.      Mental Status: She is alert.      Motor: No weakness.      Gait: Gait normal.   Psychiatric:         Mood and Affect: Mood normal.         Behavior: Behavior normal.         Thought Content: Thought content normal.         Judgment: Judgment normal.         EKG:     Results for orders placed or performed in visit on 04/02/24   IN OFFICE EKG 12-LEAD (to [place])    Collection Time: 04/02/24  4:02 PM   Result Value Ref Range    QRS Duration 70 ms    [facility] QTC Calculation 411 ms    Narrative    Test Reason : Z01.818,    Vent. Rate : 081 BPM     Atrial Rate : 081 BPM     P-R Int : 152 ms          QRS Dur : 070 ms      QT Int : 354 ms       P-R-T Axes : 045 012 002 degrees     QTc Int : 411 ms    Normal sinus rhythm  Possible Left atrial enlargement  LVH  Nonspecific T wave abnormality  Abnormal ECG  When compared with ECG of 25-NOV-2014 08:23,  Nonspecific T wave abnormality now in  Anterior leads  Confirmed by ANGIE GARCIA MD ([id number]) on 4/3/2024 10:22:02 AM    Referred By:  TRISTIAN MITCHELL           Confirmed By:ANGIE GARCIA MD         CHEST X-RAY:    Result Date: 4/3/2024    EXAMINATION:  XR CHEST PA AND LATERAL     CLINICAL HISTORY:  Encounter for other preprocedural examination     TECHNIQUE:  PA and lateral views of the chest were performed.     COMPARISON:  Two thousand fourteen     FINDINGS:  Bilateral small cervical ribs.  Lungs clear.  Heart normal size.     Impression:  Stable chest.  No active process.       Electronically signed by: Harry Barajas MD   Date:    04/03/2024   Time:    08:33         RCRI:     Revised Cardiac Risk Index for Pre-Operative Risk   Elevated-risk surgery  Intraperitoneal; intrathoracic; suprainguinal  vascular No   History of ischemic heart disease  History of myocardial infarction (MI); history of positive exercise test; current chest pain considered due to myocardial ischemia; use of nitrate therapy or ECG with pathological Q waves No   History of congestive heart failure  Pulmonary edema, bilateral rales or S3 gallop; paroxysmal nocturnal dyspnea; chest x-ray (CXR) showing pulmonary vascular redistribution No   History of cerebrovascular disease  Prior transient ischemic attack (TIA) or stroke No   Pre-operative treatment with insulin No   Pre-operative creatinine >2 mg No   RCRI score 3.9% 30 day risk of death, MI, or cardiac arrest; indicating low risk for surgery.         ASSESSMENT & PLAN:    Chasity was seen today for pre-op exam.    Diagnoses and all orders for this visit:    Pre-op evaluation  Patient's bariatric surgeon is requesting CXR & EKG prior to patient's gastric bypass surgery.   EKG done in office today - NSR, non-specific T-wave abnormality in anterior leads.  CXR ordered - no acute process.  RCRI 3.9%, 30 day risk of death, MI, or cardiac arrest, indicating low risk for surgery.  Letter sent via portal.  -     IN OFFICE EKG 12-LEAD (to [place])  -     X-Ray Chest PA And Lateral; Future; Expected date: 04/02/2024            Maris Bacon MD  [facility]

## 2024-04-03 ENCOUNTER — PATIENT MESSAGE (OUTPATIENT)
Dept: INTERNAL MEDICINE | Facility: CLINIC | Age: 44
End: 2024-04-03
Payer: COMMERCIAL

## 2024-04-03 LAB
OHS QRS DURATION: 70 MS
OHS QTC CALCULATION: 411 MS

## 2024-05-16 ENCOUNTER — PATIENT MESSAGE (OUTPATIENT)
Dept: INTERNAL MEDICINE | Facility: CLINIC | Age: 44
End: 2024-05-16
Payer: COMMERCIAL

## 2024-05-20 ENCOUNTER — PATIENT MESSAGE (OUTPATIENT)
Dept: INTERNAL MEDICINE | Facility: CLINIC | Age: 44
End: 2024-05-20
Payer: COMMERCIAL

## 2024-05-23 DIAGNOSIS — E78.5 HYPERLIPIDEMIA, UNSPECIFIED HYPERLIPIDEMIA TYPE: ICD-10-CM

## 2024-05-23 DIAGNOSIS — I10 ESSENTIAL HYPERTENSION: ICD-10-CM

## 2024-05-23 RX ORDER — LOSARTAN POTASSIUM 25 MG/1
25 TABLET ORAL
Qty: 90 TABLET | Refills: 0 | Status: SHIPPED | OUTPATIENT
Start: 2024-05-23

## 2024-05-23 RX ORDER — ROSUVASTATIN CALCIUM 40 MG/1
40 TABLET, COATED ORAL
Qty: 90 TABLET | Refills: 3 | Status: SHIPPED | OUTPATIENT
Start: 2024-05-23

## 2024-05-23 NOTE — TELEPHONE ENCOUNTER
Provider Staff:  Action required for this patient    Requires labs      Please see care gap opportunities below in Care Due Message.    Thanks!  [facility]     Appointments      Date Provider   Last Visit   8/17/2023 Odilon Alas MD   Next Visit   5/27/2024 Odilon Alas MD     Refill Decision Note   Chasity Kwan  is requesting a refill authorization.  Brief Assessment and Rationale for Refill:  Approve     Medication Therapy Plan:        Comments:     Note composed:11:10 AM 05/23/2024

## 2024-05-23 NOTE — TELEPHONE ENCOUNTER
Care Due:                  Date            Visit Type   Department     Provider  --------------------------------------------------------------------------------                                MYCHART                              FOLLOWUP/OF  [facility] INTERNAL  Last Visit: 04-      FICE VISIT   MEDICINE       Maris Bacon                              MYCHART                              FOLLOWUP/OF  [facility] INTERNAL  Next Visit: 05-      FICE VISIT   MEDICINE       VALENTÍN VASQUEZ                                                            Last  Test          Frequency    Reason                     Performed    Due Date  --------------------------------------------------------------------------------    CBC.........  12 months..  valACYclovir.............  08- 08-    CMP.........  12 months..  losartan, valACYclovir...  08- 08-    [facility] Embedded Care Due Messages. Reference number: [id number].   5/23/2024 6:32:06 AM CDT

## 2024-05-23 NOTE — TELEPHONE ENCOUNTER
Refill Routing Note   Medication(s) are not appropriate for processing by [facility] for the following reason(s):        Non-participating provider  Responsible provider unclear    [facility] action(s):  Route             Appointments  past 12m or future 3m with PCP    Date Provider   Last Visit   Visit date not found Sendy Garcia DNP   Next Visit   Visit date not found Sendy Garcia DNP   ED visits in past 90 days: 0        Note composed:10:59 AM 05/23/2024

## 2024-06-03 ENCOUNTER — TELEPHONE (OUTPATIENT)
Dept: INTERNAL MEDICINE | Facility: CLINIC | Age: 44
End: 2024-06-03

## 2024-06-03 NOTE — TELEPHONE ENCOUNTER
----- Message from Frances Haas sent at 6/3/2024  4:15 PM CDT -----  Contact: [phone] Patient  type: Lab    Caller is requesting to schedule their Lab appointment prior to annual appointment.  Order is not listed in EPIC.  Please enter order and contact patient to schedule.        Preferred Date and Time of Labs:06/07/2024 Morning 7:30 AM     Date of Annual Physical Appointment:06/14/2024    Where would they like the lab performed?[facility], [place], operated by [facility]    Would the patient rather a call back or a response via [facility]? Portal Message

## 2024-06-10 ENCOUNTER — PATIENT MESSAGE (OUTPATIENT)
Dept: INTERNAL MEDICINE | Facility: CLINIC | Age: 44
End: 2024-06-10
Payer: COMMERCIAL

## 2024-06-13 ENCOUNTER — TELEPHONE (OUTPATIENT)
Dept: INTERNAL MEDICINE | Facility: CLINIC | Age: 44
End: 2024-06-13
Payer: COMMERCIAL

## 2024-06-13 NOTE — TELEPHONE ENCOUNTER
----- Message from Felipa Nova sent at 6/13/2024 10:08 AM CDT -----  Contact: [phone]  Patient is returning a phone call.  Who left a message for the patient: Hillary Francis MA  Does patient know what this is regarding:  LABS  Would you like a call back, or a response through your [facility] portal?:   call  Comments: Patient appointment is for 2:15 and no labs orders are in the system, patient would like to have them done in the morning as stated , please call to schedule labs at [facility]

## 2024-06-14 ENCOUNTER — PATIENT MESSAGE (OUTPATIENT)
Dept: INTERNAL MEDICINE | Facility: CLINIC | Age: 44
End: 2024-06-14
Payer: COMMERCIAL

## 2024-06-17 DIAGNOSIS — Z00.00 LABORATORY EXAM ORDERED AS PART OF ROUTINE GENERAL MEDICAL EXAMINATION: Primary | ICD-10-CM

## 2024-06-19 ENCOUNTER — PATIENT MESSAGE (OUTPATIENT)
Dept: NEUROLOGY | Facility: CLINIC | Age: 44
End: 2024-06-19
Payer: COMMERCIAL

## 2024-07-01 ENCOUNTER — PATIENT OUTREACH (OUTPATIENT)
Dept: ADMINISTRATIVE | Facility: HOSPITAL | Age: 44
End: 2024-07-01
Payer: COMMERCIAL

## 2024-07-01 NOTE — PROGRESS NOTES
There are no preventive care reminders to display for this patient.    Chart reviewed and updated. Reconciled immunizations.  Neli Alegre LPN   Clinical Care Coordinator  Primary Care and Wellness

## 2024-07-11 ENCOUNTER — OFFICE VISIT (OUTPATIENT)
Dept: INTERNAL MEDICINE | Facility: CLINIC | Age: 44
End: 2024-07-11
Payer: COMMERCIAL

## 2024-07-11 ENCOUNTER — LAB VISIT (OUTPATIENT)
Dept: LAB | Facility: HOSPITAL | Age: 44
End: 2024-07-11
Payer: COMMERCIAL

## 2024-07-11 VITALS
HEART RATE: 73 BPM | OXYGEN SATURATION: 97 % | HEIGHT: 59 IN | DIASTOLIC BLOOD PRESSURE: 66 MMHG | BODY MASS INDEX: 38.23 KG/M2 | SYSTOLIC BLOOD PRESSURE: 100 MMHG | WEIGHT: 189.63 LBS

## 2024-07-11 DIAGNOSIS — Z00.00 ANNUAL PHYSICAL EXAM: Primary | ICD-10-CM

## 2024-07-11 DIAGNOSIS — I10 PRIMARY HYPERTENSION: ICD-10-CM

## 2024-07-11 DIAGNOSIS — E78.5 HYPERLIPIDEMIA, UNSPECIFIED HYPERLIPIDEMIA TYPE: ICD-10-CM

## 2024-07-11 DIAGNOSIS — G47.33 OSA ON CPAP: ICD-10-CM

## 2024-07-11 DIAGNOSIS — Z00.00 LABORATORY EXAM ORDERED AS PART OF ROUTINE GENERAL MEDICAL EXAMINATION: ICD-10-CM

## 2024-07-11 DIAGNOSIS — B35.1 ONYCHOMYCOSIS: ICD-10-CM

## 2024-07-11 DIAGNOSIS — Z82.49 FAMILY HISTORY OF CORONARY ARTERY DISEASE: ICD-10-CM

## 2024-07-11 LAB
ALBUMIN SERPL BCP-MCNC: 3.5 G/DL (ref 3.5–5.2)
ALP SERPL-CCNC: 70 U/L (ref 55–135)
ALT SERPL W/O P-5'-P-CCNC: 29 U/L (ref 10–44)
ANION GAP SERPL CALC-SCNC: 7 MMOL/L (ref 8–16)
AST SERPL-CCNC: 26 U/L (ref 10–40)
BASOPHILS # BLD AUTO: 0.03 K/UL (ref 0–0.2)
BASOPHILS NFR BLD: 0.4 % (ref 0–1.9)
BILIRUB SERPL-MCNC: 0.3 MG/DL (ref 0.1–1)
BUN SERPL-MCNC: 16 MG/DL (ref 6–20)
CALCIUM SERPL-MCNC: 9.4 MG/DL (ref 8.7–10.5)
CHLORIDE SERPL-SCNC: 104 MMOL/L (ref 95–110)
CHOLEST SERPL-MCNC: 186 MG/DL (ref 120–199)
CHOLEST/HDLC SERPL: 2.4 {RATIO} (ref 2–5)
CO2 SERPL-SCNC: 27 MMOL/L (ref 23–29)
CREAT SERPL-MCNC: 0.7 MG/DL (ref 0.5–1.4)
DIFFERENTIAL METHOD BLD: ABNORMAL
EOSINOPHIL # BLD AUTO: 0.2 K/UL (ref 0–0.5)
EOSINOPHIL NFR BLD: 2.1 % (ref 0–8)
ERYTHROCYTE [DISTWIDTH] IN BLOOD BY AUTOMATED COUNT: 13.9 % (ref 11.5–14.5)
EST. GFR  (NO RACE VARIABLE): >60 ML/MIN/1.73 M^2
ESTIMATED AVG GLUCOSE: 108 MG/DL (ref 68–131)
GLUCOSE SERPL-MCNC: 98 MG/DL (ref 70–110)
HBA1C MFR BLD: 5.4 % (ref 4–5.6)
HCT VFR BLD AUTO: 36.4 % (ref 37–48.5)
HDLC SERPL-MCNC: 77 MG/DL (ref 40–75)
HDLC SERPL: 41.4 % (ref 20–50)
HGB BLD-MCNC: 11.6 G/DL (ref 12–16)
IMM GRANULOCYTES # BLD AUTO: 0.02 K/UL (ref 0–0.04)
IMM GRANULOCYTES NFR BLD AUTO: 0.3 % (ref 0–0.5)
LDLC SERPL CALC-MCNC: 93 MG/DL (ref 63–159)
LYMPHOCYTES # BLD AUTO: 2.3 K/UL (ref 1–4.8)
LYMPHOCYTES NFR BLD: 31.2 % (ref 18–48)
MCH RBC QN AUTO: 29 PG (ref 27–31)
MCHC RBC AUTO-ENTMCNC: 31.9 G/DL (ref 32–36)
MCV RBC AUTO: 91 FL (ref 82–98)
MONOCYTES # BLD AUTO: 0.7 K/UL (ref 0.3–1)
MONOCYTES NFR BLD: 8.9 % (ref 4–15)
NEUTROPHILS # BLD AUTO: 4.2 K/UL (ref 1.8–7.7)
NEUTROPHILS NFR BLD: 57.1 % (ref 38–73)
NONHDLC SERPL-MCNC: 109 MG/DL
NRBC BLD-RTO: 0 /100 WBC
PLATELET # BLD AUTO: 272 K/UL (ref 150–450)
PMV BLD AUTO: 13 FL (ref 9.2–12.9)
POTASSIUM SERPL-SCNC: 3.8 MMOL/L (ref 3.5–5.1)
PROT SERPL-MCNC: 7.2 G/DL (ref 6–8.4)
RBC # BLD AUTO: 4 M/UL (ref 4–5.4)
SODIUM SERPL-SCNC: 138 MMOL/L (ref 136–145)
TRIGL SERPL-MCNC: 80 MG/DL (ref 30–150)
WBC # BLD AUTO: 7.28 K/UL (ref 3.9–12.7)

## 2024-07-11 PROCEDURE — 80053 COMPREHEN METABOLIC PANEL: CPT | Performed by: INTERNAL MEDICINE

## 2024-07-11 PROCEDURE — 4010F ACE/ARB THERAPY RXD/TAKEN: CPT | Mod: CPTII,S$GLB,, | Performed by: INTERNAL MEDICINE

## 2024-07-11 PROCEDURE — 36415 COLL VENOUS BLD VENIPUNCTURE: CPT | Performed by: INTERNAL MEDICINE

## 2024-07-11 PROCEDURE — 99396 PREV VISIT EST AGE 40-64: CPT | Mod: S$GLB,,, | Performed by: INTERNAL MEDICINE

## 2024-07-11 PROCEDURE — 1160F RVW MEDS BY RX/DR IN RCRD: CPT | Mod: CPTII,S$GLB,, | Performed by: INTERNAL MEDICINE

## 2024-07-11 PROCEDURE — 3078F DIAST BP <80 MM HG: CPT | Mod: CPTII,S$GLB,, | Performed by: INTERNAL MEDICINE

## 2024-07-11 PROCEDURE — 99999 PR PBB SHADOW E&M-EST. PATIENT-LVL IV: CPT | Mod: PBBFAC,,, | Performed by: INTERNAL MEDICINE

## 2024-07-11 PROCEDURE — 3008F BODY MASS INDEX DOCD: CPT | Mod: CPTII,S$GLB,, | Performed by: INTERNAL MEDICINE

## 2024-07-11 PROCEDURE — 1159F MED LIST DOCD IN RCRD: CPT | Mod: CPTII,S$GLB,, | Performed by: INTERNAL MEDICINE

## 2024-07-11 PROCEDURE — 80061 LIPID PANEL: CPT | Performed by: INTERNAL MEDICINE

## 2024-07-11 PROCEDURE — 3044F HG A1C LEVEL LT 7.0%: CPT | Mod: CPTII,S$GLB,, | Performed by: INTERNAL MEDICINE

## 2024-07-11 PROCEDURE — 85025 COMPLETE CBC W/AUTO DIFF WBC: CPT | Performed by: INTERNAL MEDICINE

## 2024-07-11 PROCEDURE — 83036 HEMOGLOBIN GLYCOSYLATED A1C: CPT | Performed by: INTERNAL MEDICINE

## 2024-07-11 PROCEDURE — 3074F SYST BP LT 130 MM HG: CPT | Mod: CPTII,S$GLB,, | Performed by: INTERNAL MEDICINE

## 2024-07-11 RX ORDER — TERBINAFINE HYDROCHLORIDE 250 MG/1
250 TABLET ORAL DAILY
Qty: 42 TABLET | Refills: 0 | Status: SHIPPED | OUTPATIENT
Start: 2024-07-11 | End: 2024-08-22

## 2024-07-11 NOTE — PROGRESS NOTES
"    CHIEF COMPLAINT     Chief Complaint   Patient presents with    Annual Exam       HPI     Chasity Kwan is a 44 y.o. female  hypertension hyperlipidemia,anxiety high BMI with history of bariatric surgery  here today for here today for annual exam    Has lost 30lbs. She is currently being evaluated for bariatric surgery revision 2/2 gerd and stricture    Compliant with CPAP- feels effective. Has APAP so hasn't had to adjust settings 2/2 weight loss    BP lower end of normal range on losartan 25mg daily.    Has fungal infection R middle finger.    Personally Reviewed Patient's Medical, surgical, family and social hx. Changes updated in [facility].  Care Team updated in Epic    Review of Systems:  Review of Systems   Constitutional:  Negative for fatigue and fever.   Respiratory:  Negative for cough and shortness of breath.    Gastrointestinal:  Negative for constipation and diarrhea.       Health Maintenance:   Reviewed with patient  Due for the following:      PHYSICAL EXAM     /66 (BP Location: Right arm, Patient Position: Sitting)   Pulse 73   Ht 4' 11" (1.499 m)   Wt 86 kg (189 lb 9.5 oz)   SpO2 97%   BMI 38.29 kg/m²     Gen: Well Appearing, NAD  HEENT: PERR, EOMI  Neck: FROM, no thyromegaly, no cervical adenopathy  CVD: RRR, no M/R/G  Pulm: Normal work of breathing, CTAB, no wheezing  Abd:  Soft, NT, ND non TTP, no mass  MSK: no LE edema  Neuro: A&Ox3, gait normal, speech normal  Mood; Mood normal, behavior normal, thought process linear   Skin: onychomycosis R middle finger    LABS     Labs reviewed;  pending  ASSESSMENT     1. Annual physical exam        2. Primary hypertension        3. Hyperlipidemia, unspecified hyperlipidemia type        4. Family history of coronary artery disease        5. NASIM on CPAP        6. Onychomycosis  terbinafine HCL (LAMISIL) 250 mg tablet              Plan     Chasity Kwan is a 44 y.o. female with ypertension hyperlipidemia,anxiety high BMI " with history of bariatric surgery  here today for here today for annual exam  1. Annual physical exam  Updated problem list, medical history, care team and discussed HM.   Congratulated on weight loss    2. Primary hypertension  Goal continue losartan 25.  Discussed D prescribing medication due weight loss, she will monitor and think about    3. Hyperlipidemia, unspecified hyperlipidemia type  On statin get updated lipid panel today.    4. Family history of coronary artery disease  Discussed getting coronary calcium score to further risk stratify patient due to family history of family members with coronary artery disease with initial events around age 50.     5. NASIM on CPAP  Continue CPAP    6. Onychomycosis  Will treat for 6 weeks  - terbinafine HCL (LAMISIL) 250 mg tablet; Take 1 tablet (250 mg total) by mouth once daily.  Dispense: 42 tablet; Refill: 0    Follow-up 1 year  Odilon Alas MD

## 2024-07-17 ENCOUNTER — PATIENT MESSAGE (OUTPATIENT)
Dept: INTERNAL MEDICINE | Facility: CLINIC | Age: 44
End: 2024-07-17
Payer: COMMERCIAL

## 2024-07-17 DIAGNOSIS — D64.9 ANEMIA, UNSPECIFIED TYPE: Primary | ICD-10-CM

## 2024-08-20 ENCOUNTER — PATIENT MESSAGE (OUTPATIENT)
Dept: DERMATOLOGY | Facility: CLINIC | Age: 44
End: 2024-08-20

## 2024-08-20 ENCOUNTER — OFFICE VISIT (OUTPATIENT)
Dept: DERMATOLOGY | Facility: CLINIC | Age: 44
End: 2024-08-20
Payer: COMMERCIAL

## 2024-08-20 DIAGNOSIS — L70.0 ACNE VULGARIS: Primary | ICD-10-CM

## 2024-08-20 DIAGNOSIS — L60.3 ONYCHODYSTROPHY: ICD-10-CM

## 2024-08-20 PROCEDURE — 1160F RVW MEDS BY RX/DR IN RCRD: CPT | Mod: CPTII,95,, | Performed by: DERMATOLOGY

## 2024-08-20 PROCEDURE — G2211 COMPLEX E/M VISIT ADD ON: HCPCS | Mod: 95,,, | Performed by: DERMATOLOGY

## 2024-08-20 PROCEDURE — 99204 OFFICE O/P NEW MOD 45 MIN: CPT | Mod: 95,,, | Performed by: DERMATOLOGY

## 2024-08-20 PROCEDURE — 1159F MED LIST DOCD IN RCRD: CPT | Mod: CPTII,95,, | Performed by: DERMATOLOGY

## 2024-08-20 PROCEDURE — 3044F HG A1C LEVEL LT 7.0%: CPT | Mod: CPTII,95,, | Performed by: DERMATOLOGY

## 2024-08-20 PROCEDURE — 4010F ACE/ARB THERAPY RXD/TAKEN: CPT | Mod: CPTII,95,, | Performed by: DERMATOLOGY

## 2024-08-20 RX ORDER — CLINDAMYCIN PHOSPHATE 10 UG/ML
LOTION TOPICAL
Qty: 60 ML | Refills: 3 | Status: SHIPPED | OUTPATIENT
Start: 2024-08-20

## 2024-08-20 RX ORDER — TRETINOIN 0.5 MG/G
CREAM TOPICAL
Qty: 20 G | Refills: 5 | Status: SHIPPED | OUTPATIENT
Start: 2024-08-20

## 2024-08-20 NOTE — PROGRESS NOTES
The patient location is: home  The chief complaint leading to consultation is: fingernail and acne  Visit type: virtual visit with synchronous audio and video  Total time spent with patient: 10 minutes  Each patient to whom I provide medical services by telemedicine is:  (1) informed of the relationship between the physician and patient and the respective role of any other health care provider with respect to management of the patient; and (2) notified that he or she may decline to receive medical services by telemedicine and may withdraw from such care at any time.      Patient Information  Name: Chasity Kwan  : 1980  MRN: [id number]     Referring Physician:  No ref. provider found   Primary Care Physician:  Odilon Alas MD   Date of Visit: 2024      Subjective:     History of Present lllness:    Chasity Kwan is a 44 y.o. female who presents with a chief complaint of nail falling off and acne.    nail falling off   Location: R finger  Duration: 1-3 mths  Signs/Symptoms: treated for fungal infection of nail, still not healing and looks like it needs to be removed   Exacerbating factors: Had an acrylic overly, She hit her nail a few times and looked bruised.  Relieving factors/Prior treatments: OTC antifungals, took 30 days of terbinafine    Acne  Location: face, back, chest  Duration: 3-4 mo  Signs/Symptoms: breakouts  Exacerbating factors: stopped OCP  Relieving factors/Prior treatments: Differin wash, clindamycin swabs, RetinA 0.025%    Clinical documentation obtained by nursing staff reviewed.    Review of Systems    Objective:   Physical Exam     Diagram Legend     Erythematous scaling macule/papule c/w actinic keratosis       Vascular papule c/w angioma      Pigmented verrucoid papule/plaque c/w seborrheic keratosis      Yellow umbilicated papule c/w sebaceous hyperplasia      Irregularly shaped tan macule c/w lentigo     1-2 mm smooth white papules consistent with  Milia      Movable subcutaneous cyst with punctum c/w epidermal inclusion cyst      Subcutaneous movable cyst c/w pilar cyst      Firm pink to brown papule c/w dermatofibroma      Pedunculated fleshy papule(s) c/w skin tag(s)      Evenly pigmented macule c/w junctional nevus     Mildly variegated pigmented, slightly irregular-bordered macule c/w mildly atypical nevus      Flesh colored to evenly pigmented papule c/w intradermal nevus       Pink pearly papule/plaque c/w basal cell carcinoma      Erythematous hyperkeratotic cursted plaque c/w SCC      Surgical scar with no sign of skin cancer recurrence      Open and closed comedones      Inflammatory papules and pustules      Verrucoid papule consistent consistent with wart     Erythematous eczematous patches and plaques     Dystrophic onycholytic nail with subungual debris c/w onychomycosis     Umbilicated papule    Erythematous-base heme-crusted tan verrucoid plaque consistent with inflamed seborrheic keratosis     Erythematous Silvery Scaling Plaque c/w Psoriasis     See annotation              [] Data reviewed  [] Prior external notes reviewed  [] Independent review of test  [] Management discussed with another provider  [] Independent historian    Assessment / Plan:        Acne vulgaris  - chronic problem with exacerbation/progression  -     tretinoin (RETIN-A) 0.05 % cream; Apply a pea-sized amount to entire face qhs.  Dispense: 20 g; Refill: 5  If too drying/irritating, can alternate with tretinoin 0.025% cream until skin adjusts.    -     clindamycin (CLEOCIN T) 1 % lotion; Apply to affected areas of face BID prn acne.  Dispense: 60 mL; Refill: 3  Recommended an OTC benzoyl peroxide (2-5%) wash, such as CeraVe Acne Foaming Cream Cleanser, PanOxyl 4% Creamy Wash, or Neutrogena Clear Pore Cleanser/Mask. It may be best to use benzoyl peroxide while in the shower and to dry off with a white towel, as it can bleach towels, sheets, and clothing if not rinsed well  from the skin. Use benzoyl peroxide wash at least 2-3 times per week to prevent bacterial resistance.    -     dapsone-spironolactone-niacin 8.5-5-2 % Gel; Apply to face once daily.  Dispense: 30 g; Refill: 5    Onychodystrophy  Reassurance was given to the patient. Will likely grow out normally. Will recheck at f/u.      Follow up in about 3 months (around 11/20/2024).      Vicky Rojas MD, FAAD  [facility]

## 2024-08-21 DIAGNOSIS — B35.1 ONYCHOMYCOSIS: ICD-10-CM

## 2024-08-22 RX ORDER — TERBINAFINE HYDROCHLORIDE 250 MG/1
250 TABLET ORAL
Qty: 30 TABLET | Refills: 1 | Status: SHIPPED | OUTPATIENT
Start: 2024-08-22

## 2024-08-22 NOTE — TELEPHONE ENCOUNTER
Refill Routing Note   Medication(s) are not appropriate for processing by [facility] for the following reason(s):        Outside of protocol    [facility] action(s):  Route             Appointments  past 12m or future 3m with PCP    Date Provider   Last Visit   7/11/2024 Odilon Alas MD   Next Visit   Visit date not found Odilon Alas MD   ED visits in past 90 days: 0        Note composed:8:54 PM 08/21/2024

## 2024-08-28 ENCOUNTER — OFFICE VISIT (OUTPATIENT)
Dept: OBSTETRICS AND GYNECOLOGY | Facility: CLINIC | Age: 44
End: 2024-08-28
Payer: COMMERCIAL

## 2024-08-28 VITALS
DIASTOLIC BLOOD PRESSURE: 80 MMHG | HEIGHT: 59 IN | SYSTOLIC BLOOD PRESSURE: 126 MMHG | WEIGHT: 198.19 LBS | BODY MASS INDEX: 39.96 KG/M2

## 2024-08-28 DIAGNOSIS — N92.4 EXCESSIVE BLEEDING IN PREMENOPAUSAL PERIOD: ICD-10-CM

## 2024-08-28 DIAGNOSIS — Z12.31 SCREENING MAMMOGRAM FOR BREAST CANCER: ICD-10-CM

## 2024-08-28 DIAGNOSIS — Z01.419 ENCOUNTER FOR GYNECOLOGICAL EXAMINATION WITHOUT ABNORMAL FINDING: Primary | ICD-10-CM

## 2024-08-28 PROCEDURE — 1159F MED LIST DOCD IN RCRD: CPT | Mod: CPTII,S$GLB,, | Performed by: OBSTETRICS & GYNECOLOGY

## 2024-08-28 PROCEDURE — 3044F HG A1C LEVEL LT 7.0%: CPT | Mod: CPTII,S$GLB,, | Performed by: OBSTETRICS & GYNECOLOGY

## 2024-08-28 PROCEDURE — 1160F RVW MEDS BY RX/DR IN RCRD: CPT | Mod: CPTII,S$GLB,, | Performed by: OBSTETRICS & GYNECOLOGY

## 2024-08-28 PROCEDURE — 3079F DIAST BP 80-89 MM HG: CPT | Mod: CPTII,S$GLB,, | Performed by: OBSTETRICS & GYNECOLOGY

## 2024-08-28 PROCEDURE — 3074F SYST BP LT 130 MM HG: CPT | Mod: CPTII,S$GLB,, | Performed by: OBSTETRICS & GYNECOLOGY

## 2024-08-28 PROCEDURE — 99999 PR PBB SHADOW E&M-EST. PATIENT-LVL IV: CPT | Mod: PBBFAC,,, | Performed by: OBSTETRICS & GYNECOLOGY

## 2024-08-28 PROCEDURE — 4010F ACE/ARB THERAPY RXD/TAKEN: CPT | Mod: CPTII,S$GLB,, | Performed by: OBSTETRICS & GYNECOLOGY

## 2024-08-28 PROCEDURE — 99396 PREV VISIT EST AGE 40-64: CPT | Mod: S$GLB,,, | Performed by: OBSTETRICS & GYNECOLOGY

## 2024-08-28 PROCEDURE — 3008F BODY MASS INDEX DOCD: CPT | Mod: CPTII,S$GLB,, | Performed by: OBSTETRICS & GYNECOLOGY

## 2024-08-28 NOTE — PROGRESS NOTES
"CC: Well woman exam    Chasity Kwan is a 44 y.o. female  presents for a well woman exam.    She stopped OCPs six months.  Having monthly bleeding, bleeding through pads/ tampons ever two hours.  .    Past Medical History:   Diagnosis Date    Abnormal Pap smear     colpo    Genital herpes in women     H/O: myomectomy     EX lap    History of uterine fibroid     Hyperlipidemia     Hypertension     Morbid obesity     Pseudotumor cerebri        Past Surgical History:   Procedure Laterality Date    COLONOSCOPY N/A 2022    Procedure: COLONOSCOPY;  Surgeon: Micehlle Denton MD;  Location: [facility] ([address] [place] [address]);  Service: Endoscopy;  Laterality: N/A;  instr portal-GT  left vm  mal    ESOPHAGOGASTRODUODENOSCOPY      GASTRECTOMY      gastric sleeve  2015    LAPAROTOMY      SI joint injection         OB History    Para Term  AB Living   0 0 0 0 0 0   SAB IAB Ectopic Multiple Live Births   0 0 0 0         Family History   Problem Relation Name Age of Onset    Hypertension Mother      Hyperlipidemia Mother      Graves' disease Mother      Obesity Mother      Hyperlipidemia Sister      Obesity Sister      Colon cancer Father  50    Cataracts Father      Cancer Father          colon, liver, and stomach    Heart disease Maternal Grandfather      Stroke Maternal Grandmother      Diabetes Paternal Grandmother      No Known Problems Paternal Grandfather      Breast cancer Neg Hx      Ovarian cancer Neg Hx         Social History     Tobacco Use    Smoking status: Never    Smokeless tobacco: Never   Substance Use Topics    Alcohol use: Yes     Comment: occasionally    Drug use: No       /80   Ht 4' 11" (1.499 m)   Wt 89.9 kg (198 lb 3.1 oz)   LMP 2024 (Approximate)   BMI 40.03 kg/m²     ROS:  GENERAL: Denies weight gain or weight loss. Feeling well overall.   SKIN: Denies rash or lesions.   HEAD: Denies head injury or headache.   NODES: Denies enlarged lymph nodes. "   CHEST: Denies chest pain or shortness of breath.   CARDIOVASCULAR: Denies palpitations or left sided chest pain.   ABDOMEN: No abdominal pain, constipation, diarrhea, nausea, vomiting or rectal bleeding.   URINARY: No frequency, dysuria, hematuria, or burning on urination.  REPRODUCTIVE: See HPI.   BREASTS: The patient performs breast self-examination and denies pain, lumps, or nipple discharge.   HEMATOLOGIC: No easy bruisability or excessive bleeding.  MUSCULOSKELETAL: Denies joint pain or swelling.   NEUROLOGIC: Denies syncope or weakness.   PSYCHIATRIC: Denies depression, anxiety or mood swings.    Physical Exam:    APPEARANCE: Well nourished, well developed, in no acute distress.  AFFECT: WNL, alert and oriented x 3  SKIN: No acne or hirsutism  NECK: Neck symmetric without masses or thyromegaly  NODES: No inguinal, cervical, axillary, or femoral lymph node enlargement  CHEST: Good respiratory effect  ABDOMEN: Soft.  No tenderness or masses.  No hepatosplenomegaly.  No hernias.  BREASTS: Symmetrical, no skin changes or visible lesions.  No palpable masses, nipple discharge bilaterally.  PELVIC: Normal external genitalia without lesions.  Normal hair distribution.  Adequate perineal body, normal urethral meatus.  Vagina moist and well rugated without lesions or discharge.  Cervix pink, without lesions, discharge or tenderness.  No significant cystocele or rectocele.  Bimanual exam shows uterus to be normal size, regular, mobile and nontender.  Adnexa without masses or tenderness.    EXTREMITIES: No edema.    ASSESSMENT AND PLAN  1. Encounter for gynecological examination without abnormal finding        2. Screening mammogram for breast cancer  Mammo Digital Screening Bilat w/ Robert      3. Excessive bleeding in premenopausal period  US Pelvis Comp with Transvag NON-OB (xpd          Patient was counseled today on A.C.S. Pap guidelines and recommendations for yearly pelvic exams, mammograms and monthly self breast  exams; to see her PCP for other health maintenance.   Discussed DUB treatment options- Possible UAE, VS endometrial ablation vs HYST. Treat menorrhagia with OCPs, Lysteda or medical management.   Risks and benefits of surgical  procedures discussed.     Will consider options    F/U EMBx

## 2024-08-29 ENCOUNTER — HOSPITAL ENCOUNTER (OUTPATIENT)
Dept: RADIOLOGY | Facility: OTHER | Age: 44
Discharge: HOME | End: 2024-08-29
Attending: OBSTETRICS & GYNECOLOGY
Payer: COMMERCIAL

## 2024-08-29 DIAGNOSIS — N92.4 EXCESSIVE BLEEDING IN PREMENOPAUSAL PERIOD: ICD-10-CM

## 2024-08-29 PROCEDURE — 76830 TRANSVAGINAL US NON-OB: CPT | Mod: 26,,, | Performed by: RADIOLOGY

## 2024-08-29 PROCEDURE — 76830 TRANSVAGINAL US NON-OB: CPT | Mod: TC

## 2024-08-29 PROCEDURE — 76856 US EXAM PELVIC COMPLETE: CPT | Mod: TC

## 2024-08-29 PROCEDURE — 76856 US EXAM PELVIC COMPLETE: CPT | Mod: 26,,, | Performed by: RADIOLOGY

## 2024-09-08 ENCOUNTER — PATIENT MESSAGE (OUTPATIENT)
Dept: DERMATOLOGY | Facility: CLINIC | Age: 44
End: 2024-09-08
Payer: COMMERCIAL

## 2024-09-19 ENCOUNTER — OFFICE VISIT (OUTPATIENT)
Dept: ORTHOPEDICS | Facility: CLINIC | Age: 44
End: 2024-09-19
Payer: COMMERCIAL

## 2024-09-19 ENCOUNTER — HOSPITAL ENCOUNTER (OUTPATIENT)
Dept: RADIOLOGY | Facility: HOSPITAL | Age: 44
Discharge: HOME | End: 2024-09-19
Attending: PHYSICIAN ASSISTANT
Payer: COMMERCIAL

## 2024-09-19 VITALS — BODY MASS INDEX: 40 KG/M2 | HEIGHT: 59 IN | WEIGHT: 198.44 LBS

## 2024-09-19 DIAGNOSIS — M54.50 LOW BACK PAIN, UNSPECIFIED BACK PAIN LATERALITY, UNSPECIFIED CHRONICITY, UNSPECIFIED WHETHER SCIATICA PRESENT: ICD-10-CM

## 2024-09-19 DIAGNOSIS — M54.41 ACUTE RIGHT-SIDED LOW BACK PAIN WITH RIGHT-SIDED SCIATICA: Primary | ICD-10-CM

## 2024-09-19 PROCEDURE — 99214 OFFICE O/P EST MOD 30 MIN: CPT | Mod: S$GLB,,, | Performed by: PHYSICIAN ASSISTANT

## 2024-09-19 PROCEDURE — 99999 PR PBB SHADOW E&M-EST. PATIENT-LVL IV: CPT | Mod: PBBFAC,,, | Performed by: PHYSICIAN ASSISTANT

## 2024-09-19 PROCEDURE — 3044F HG A1C LEVEL LT 7.0%: CPT | Mod: CPTII,S$GLB,, | Performed by: PHYSICIAN ASSISTANT

## 2024-09-19 PROCEDURE — 4010F ACE/ARB THERAPY RXD/TAKEN: CPT | Mod: CPTII,S$GLB,, | Performed by: PHYSICIAN ASSISTANT

## 2024-09-19 PROCEDURE — 1160F RVW MEDS BY RX/DR IN RCRD: CPT | Mod: CPTII,S$GLB,, | Performed by: PHYSICIAN ASSISTANT

## 2024-09-19 PROCEDURE — 72110 X-RAY EXAM L-2 SPINE 4/>VWS: CPT | Mod: 26,,, | Performed by: RADIOLOGY

## 2024-09-19 PROCEDURE — 3008F BODY MASS INDEX DOCD: CPT | Mod: CPTII,S$GLB,, | Performed by: PHYSICIAN ASSISTANT

## 2024-09-19 PROCEDURE — 72110 X-RAY EXAM L-2 SPINE 4/>VWS: CPT | Mod: TC

## 2024-09-19 PROCEDURE — 1159F MED LIST DOCD IN RCRD: CPT | Mod: CPTII,S$GLB,, | Performed by: PHYSICIAN ASSISTANT

## 2024-09-19 RX ORDER — METHYLPREDNISOLONE 4 MG/1
TABLET ORAL
Qty: 21 EACH | Refills: 0 | Status: SHIPPED | OUTPATIENT
Start: 2024-09-19 | End: 2024-10-10

## 2024-09-19 RX ORDER — GABAPENTIN 300 MG/1
300 CAPSULE ORAL 2 TIMES DAILY
Qty: 60 CAPSULE | Refills: 0 | Status: SHIPPED | OUTPATIENT
Start: 2024-09-19 | End: 2024-10-19

## 2024-09-19 RX ORDER — METHOCARBAMOL 750 MG/1
750 TABLET, FILM COATED ORAL 3 TIMES DAILY PRN
Qty: 30 TABLET | Refills: 0 | Status: SHIPPED | OUTPATIENT
Start: 2024-09-19

## 2024-09-19 NOTE — PROGRESS NOTES
SUBJECTIVE:     Chief Complaint   Patient presents with    Lower Back - Pain       History of Present Illness:  Chasity Kwan is a 44 y.o. year old female presents today for right sided low back and buttock pain that has worsened over the past several months.  There was not a recent injury.  She has a long history of intermittent low back pain and radiculopathy.  She reports achy pain and spasms with pain radiating down her posterior thigh.  She has tried rest, ice, heat, muscle relaxants. She is not having any numbness or tingling.   There is not a history of previous injury or surgery to the hip.  The patient does not use an assistive device.      Past Medical History:   Diagnosis Date    Abnormal Pap smear 2009    colpo    Genital herpes in women     H/O: myomectomy     EX lap    History of uterine fibroid     Hyperlipidemia     Hypertension     Morbid obesity     Pseudotumor cerebri        Past Surgical History:   Procedure Laterality Date    COLONOSCOPY N/A 12/12/2022    Procedure: COLONOSCOPY;  Surgeon: Michelle Denton MD;  Location: [facility] ([address] [place] [address]);  Service: Endoscopy;  Laterality: N/A;  instr portal-GT  left vm 12/5 mal    ESOPHAGOGASTRODUODENOSCOPY      GASTRECTOMY      gastric sleeve  2015    LAPAROTOMY      SI joint injection         Family History   Problem Relation Name Age of Onset    Hypertension Mother      Hyperlipidemia Mother      Graves' disease Mother      Obesity Mother      Hyperlipidemia Sister      Obesity Sister      Colon cancer Father  50    Cataracts Father      Cancer Father          colon, liver, and stomach    Heart disease Maternal Grandfather      Stroke Maternal Grandmother      Diabetes Paternal Grandmother      No Known Problems Paternal Grandfather      Breast cancer Neg Hx      Ovarian cancer Neg Hx         Review of patient's allergies indicates:  No Known Allergies    Review of Systems:  ROS:  Constitutional: no fever or chills  Eyes: no visual  "changes  Respiratory: no cough or shortness of breath  Musculoskeletal: no arthralgias or myalgias      PE:  Ht 4' 11" (1.499 m)   Wt 90 kg (198 lb 6.6 oz)   LMP 08/01/2024 (Approximate)   BMI 40.07 kg/m²   General: Pleasant, cooperative, NAD   HEENT: NCAT, sclera nonicteric   Lungs: Respirations are equal and unlabored.   CV: 2+ bilateral upper and lower extremity pulses.   Skin: Intact throughout LE with no rashes, erythema, or lesions  Extremities: No LE edema, NVI lower extremities    Right hip/low back  Skin intact  - TTP to GTB  TTP right SI, gluteal  No pain with passive ROM hip  FROM  5/5 quadriceps, 5/5 hamstring  Positive SLR  Negative stinchfield    IMAGING:  X-rays of the lumbar spine show satisfactory alignment, no acute abnormality.  Mild degenerative changes L3-S1    ASSESSMENT     1. Acute right-sided low back pain with right-sided sciatica      PLAN:   We discussed with the patient at length all the different treatment options available for the hip including anti-inflammatories, acetaminophen, rest, ice, lower extremity strengthening exercise, occasional steroid injections for temporary relief, and finally total hip arthroplasty.     - Medrol dose pack  - HEP  - Robaxin and gabapentin  - Continue symptomatic treatment  - Will have her follow up with pain management- has had procedures done in the past that were helpful  - Follow up if symptoms worsen or fail to improve     "

## 2024-09-20 DIAGNOSIS — I10 ESSENTIAL HYPERTENSION: ICD-10-CM

## 2024-09-20 RX ORDER — LOSARTAN POTASSIUM 25 MG/1
25 TABLET ORAL
Qty: 90 TABLET | Refills: 3 | Status: SHIPPED | OUTPATIENT
Start: 2024-09-20

## 2024-09-20 NOTE — TELEPHONE ENCOUNTER
No care due was identified.  [facility] Embedded Care Due Messages. Reference number: [id number].   9/20/2024 4:28:04 AM CDT

## 2024-09-20 NOTE — TELEPHONE ENCOUNTER
Refill Decision Note   Chasity Kwan  is requesting a refill authorization.  Brief Assessment and Rationale for Refill:  Approve     Medication Therapy Plan:         Comments:     Note composed:9:34 AM 09/20/2024

## 2024-09-25 ENCOUNTER — PROCEDURE VISIT (OUTPATIENT)
Dept: OBSTETRICS AND GYNECOLOGY | Facility: CLINIC | Age: 44
End: 2024-09-25
Payer: COMMERCIAL

## 2024-09-25 VITALS
SYSTOLIC BLOOD PRESSURE: 112 MMHG | WEIGHT: 191.38 LBS | BODY MASS INDEX: 38.58 KG/M2 | DIASTOLIC BLOOD PRESSURE: 76 MMHG | HEIGHT: 59 IN

## 2024-09-25 DIAGNOSIS — N92.4 EXCESSIVE BLEEDING IN PREMENOPAUSAL PERIOD: Primary | ICD-10-CM

## 2024-09-25 LAB
B-HCG UR QL: NEGATIVE
CTP QC/QA: YES

## 2024-09-25 PROCEDURE — 88305 TISSUE EXAM BY PATHOLOGIST: CPT | Performed by: PATHOLOGY

## 2024-09-25 PROCEDURE — 81025 URINE PREGNANCY TEST: CPT | Mod: S$GLB,,, | Performed by: OBSTETRICS & GYNECOLOGY

## 2024-09-25 PROCEDURE — 58100 BIOPSY OF UTERUS LINING: CPT | Mod: S$GLB,,, | Performed by: OBSTETRICS & GYNECOLOGY

## 2024-09-26 NOTE — PROCEDURES
Endometrial Biopsy- Today    Date/Time: 9/25/2024 9:30 AM    Performed by: Jeniffer Butt MD  Authorized by: Jeniffer Butt MD    Consent:     Consent obtained:  Prior to procedure the appropriate consent was completed and verified    Patient questions answered: yes      Patient agrees, verbalizes understanding, and wants to proceed: yes      Educational handouts given: yes      Instructions and paperwork completed: yes    Indication:     Indications: Menorrhagia    Pre-procedure:     Pre-procedure timeout performed: yes    Procedure:     Procedure: endometrial biopsy with Pipelle      Cervix cleaned and prepped: yes      A paracervical block was performed: no      An intracervical block was performed: no      The cervix was dilated: no      Uterus sounded: yes      Uterus sound depth (cm):  7    Specimen collected: specimen collected and sent to pathology      Patient tolerated procedure well with no complications: yes

## 2024-09-27 LAB
FINAL PATHOLOGIC DIAGNOSIS: NORMAL
GROSS: NORMAL
Lab: NORMAL

## 2024-10-04 ENCOUNTER — PATIENT MESSAGE (OUTPATIENT)
Dept: OBSTETRICS AND GYNECOLOGY | Facility: CLINIC | Age: 44
End: 2024-10-04
Payer: COMMERCIAL

## 2024-10-08 ENCOUNTER — TELEPHONE (OUTPATIENT)
Dept: OBSTETRICS AND GYNECOLOGY | Facility: CLINIC | Age: 44
End: 2024-10-08
Payer: COMMERCIAL

## 2024-10-08 NOTE — TELEPHONE ENCOUNTER
"Patient is requesting a call to discuss ablation and fibroid shrinking.     "Camille Butt,  How are you? Im wondering what methods are an option to do the ablation and shrink my fibroids. Just starting a heavy period and I think I want to try the ablation and something to shrink the fibroids"  "

## 2024-10-08 NOTE — TELEPHONE ENCOUNTER
Your Endometrial biopsy is negative  You can do an endometrial ablation and consider doing the ACESSA to shrink fibroids.  Are you interested in proceeding ?  IF so I will need a PELVIC MRI to assess the fibroids location and depth first in preparation for Surgery.

## 2024-10-09 ENCOUNTER — TELEPHONE (OUTPATIENT)
Dept: OBSTETRICS AND GYNECOLOGY | Facility: CLINIC | Age: 44
End: 2024-10-09
Payer: COMMERCIAL

## 2024-10-09 DIAGNOSIS — N85.8 OTHER SPECIFIED NONINFLAMMATORY DISORDERS OF UTERUS: Primary | ICD-10-CM

## 2024-10-23 ENCOUNTER — TELEPHONE (OUTPATIENT)
Dept: OBSTETRICS AND GYNECOLOGY | Facility: CLINIC | Age: 44
End: 2024-10-23
Payer: COMMERCIAL

## 2024-10-23 DIAGNOSIS — N92.4 EXCESSIVE BLEEDING IN PREMENOPAUSAL PERIOD: ICD-10-CM

## 2024-10-23 DIAGNOSIS — N85.8 OTHER SPECIFIED NONINFLAMMATORY DISORDERS OF UTERUS: Primary | ICD-10-CM

## 2024-10-23 NOTE — TELEPHONE ENCOUNTER
----- Message from Tech Tommy sent at 10/23/2024  1:51 PM CDT -----  Regarding: STAT BMP Lab Order  Good afternoon, we have an MRI scheduled for this patient tomorrow morning with contrast, but see she takes HTN medication. In order to give her contrast, we would need some current lab work before we can administer. Would anyone be able to input a STAT BMP lab order? Thanks!

## 2024-10-24 ENCOUNTER — HOSPITAL ENCOUNTER (OUTPATIENT)
Dept: RADIOLOGY | Facility: OTHER | Age: 44
Discharge: HOME | End: 2024-10-24
Attending: OBSTETRICS & GYNECOLOGY
Payer: COMMERCIAL

## 2024-10-24 DIAGNOSIS — N85.8 OTHER SPECIFIED NONINFLAMMATORY DISORDERS OF UTERUS: ICD-10-CM

## 2024-10-24 DIAGNOSIS — Z12.31 SCREENING MAMMOGRAM FOR BREAST CANCER: ICD-10-CM

## 2024-10-24 PROCEDURE — 25500020 PHARM REV CODE 255: Performed by: OBSTETRICS & GYNECOLOGY

## 2024-10-24 PROCEDURE — 72197 MRI PELVIS W/O & W/DYE: CPT | Mod: 26,,, | Performed by: RADIOLOGY

## 2024-10-24 PROCEDURE — 77063 BREAST TOMOSYNTHESIS BI: CPT | Mod: TC

## 2024-10-24 PROCEDURE — A9585 GADOBUTROL INJECTION: HCPCS | Performed by: OBSTETRICS & GYNECOLOGY

## 2024-10-24 PROCEDURE — 72197 MRI PELVIS W/O & W/DYE: CPT | Mod: TC

## 2024-10-24 RX ORDER — GADOBUTROL 604.72 MG/ML
8.5 INJECTION INTRAVENOUS
Status: COMPLETED | OUTPATIENT
Start: 2024-10-24 | End: 2024-10-24

## 2024-10-24 RX ADMIN — GADOBUTROL 8.5 ML: 604.72 INJECTION INTRAVENOUS at 09:10

## 2024-10-25 ENCOUNTER — PATIENT MESSAGE (OUTPATIENT)
Dept: OBSTETRICS AND GYNECOLOGY | Facility: CLINIC | Age: 44
End: 2024-10-25
Payer: COMMERCIAL

## 2024-10-25 ENCOUNTER — TELEPHONE (OUTPATIENT)
Dept: OBSTETRICS AND GYNECOLOGY | Facility: CLINIC | Age: 44
End: 2024-10-25
Payer: COMMERCIAL

## 2024-10-31 ENCOUNTER — HOSPITAL ENCOUNTER (OUTPATIENT)
Dept: RADIOLOGY | Facility: OTHER | Age: 44
Discharge: HOME | End: 2024-10-31
Attending: OBSTETRICS & GYNECOLOGY
Payer: COMMERCIAL

## 2024-10-31 DIAGNOSIS — R92.8 ABNORMAL MAMMOGRAM: ICD-10-CM

## 2024-10-31 PROCEDURE — 77061 BREAST TOMOSYNTHESIS UNI: CPT | Mod: 26,LT,, | Performed by: RADIOLOGY

## 2024-10-31 PROCEDURE — 77065 DX MAMMO INCL CAD UNI: CPT | Mod: TC,LT

## 2024-10-31 PROCEDURE — 76642 ULTRASOUND BREAST LIMITED: CPT | Mod: TC,LT

## 2024-10-31 PROCEDURE — 76642 ULTRASOUND BREAST LIMITED: CPT | Mod: 26,LT,, | Performed by: RADIOLOGY

## 2024-10-31 PROCEDURE — 77065 DX MAMMO INCL CAD UNI: CPT | Mod: 26,LT,, | Performed by: RADIOLOGY

## 2024-11-21 ENCOUNTER — OFFICE VISIT (OUTPATIENT)
Dept: OBSTETRICS AND GYNECOLOGY | Facility: CLINIC | Age: 44
End: 2024-11-21
Payer: COMMERCIAL

## 2024-11-21 DIAGNOSIS — D21.9 FIBROIDS: Primary | ICD-10-CM

## 2024-11-21 DIAGNOSIS — R10.2 PELVIC PAIN IN FEMALE: ICD-10-CM

## 2024-11-21 PROCEDURE — 3044F HG A1C LEVEL LT 7.0%: CPT | Mod: CPTII,95,, | Performed by: OBSTETRICS & GYNECOLOGY

## 2024-11-21 PROCEDURE — 4010F ACE/ARB THERAPY RXD/TAKEN: CPT | Mod: CPTII,95,, | Performed by: OBSTETRICS & GYNECOLOGY

## 2024-11-21 PROCEDURE — 99214 OFFICE O/P EST MOD 30 MIN: CPT | Mod: 95,,, | Performed by: OBSTETRICS & GYNECOLOGY

## 2024-11-21 NOTE — PROGRESS NOTES
The patient location is: home    The chief complaint leading to consultation is: FIBROIDS    Visit type: audiovisual    Face to Face time with patient: 25   minutes of total time spent on the encounter, which includes face to face time and non-face to face time preparing to see the patient (eg, review of tests), Obtaining and/or reviewing separately obtained history, Documenting clinical information in the electronic or other health record, Independently interpreting results (not separately reported) and communicating results to the patient/family/caregiver, or Care coordination (not separately reported).         Each patient to whom he or she provides medical services by telemedicine is:  (1) informed of the relationship between the physician and patient and the respective role of any other health care provider with respect to management of the patient; and (2) notified that he or she may decline to receive medical services by telemedicine and may withdraw from such care at any time.    Notes:      CC: Well woman exam    Chasity Kwan is a 44 y.o. female  presents for a discussion on FIBROIDS  SHe is considering options    FINDINGS:  Uterus measures 8.3 x 5.2 x 6.5 cm.  Endometrial thickness estimated at 0.4 cm.     I count at least 15 intramural and subserosal leiomyomas.  These are all fairly similar in appearance, of fairly homogeneous T2 signal hypointensity with hypoenhancement following contrast.     -1 of the largest is a broad-based subserosal leiomyoma at the fundus on the left, 3.4 x 2.7 x 2.9 cm.     -anterior uterine body intramural leiomyoma, 2.4 x 2.4 x 2.5 cm.     Right ovary measures 2.6 x 2.3 x 2.2 cm.  Corpus luteum.  No suspicious adnexal mass.     Left ovary measures 2.4 x 1.5 x 1.1 cm.  No suspicious adnexal mass.     Urinary bladder is unremarkable.  Bowel loops appear normal caliber.  No significant free fluid in the pelvis.     Regional skeleton shows degenerative  change L4-5.     Impression:     At least 15 intramural and broad-based subserosal leiomyomas measuring up to 2.6 cm.        Past Medical History:   Diagnosis Date    Abnormal Pap smear 2009    colpo    Genital herpes in women     H/O: myomectomy     EX lap    History of uterine fibroid     Hyperlipidemia     Hypertension     Morbid obesity     Pseudotumor cerebri        Past Surgical History:   Procedure Laterality Date    COLONOSCOPY N/A 2022    Procedure: COLONOSCOPY;  Surgeon: Michelle Denton MD;  Location: [facility] ([address] [place] [address]);  Service: Endoscopy;  Laterality: N/A;  instr portal-GT  left vm  mal    ESOPHAGOGASTRODUODENOSCOPY      GASTRECTOMY      gastric sleeve  2015    LAPAROTOMY      SI joint injection         OB History    Para Term  AB Living   0 0 0 0 0 0   SAB IAB Ectopic Multiple Live Births   0 0 0 0         Family History   Problem Relation Name Age of Onset    Hypertension Mother      Hyperlipidemia Mother      Graves' disease Mother      Obesity Mother      Hyperlipidemia Sister      Obesity Sister      Colon cancer Father  50    Cataracts Father      Cancer Father          colon, liver, and stomach    Heart disease Maternal Grandfather      Stroke Maternal Grandmother      Diabetes Paternal Grandmother      No Known Problems Paternal Grandfather      Breast cancer Neg Hx      Ovarian cancer Neg Hx         Social History     Tobacco Use    Smoking status: Never    Smokeless tobacco: Never   Substance Use Topics    Alcohol use: Yes     Comment: occasionally    Drug use: No       There were no vitals taken for this visit.    ROS:  GENERAL: Denies weight gain or weight loss. Feeling well overall.   SKIN: Denies rash or lesions.   HEAD: Denies head injury or headache.   NODES: Denies enlarged lymph nodes.   CHEST: Denies chest pain or shortness of breath.   CARDIOVASCULAR: Denies palpitations or left sided chest pain.   ABDOMEN: No abdominal pain, constipation, diarrhea,  nausea, vomiting or rectal bleeding.   URINARY: No frequency, dysuria, hematuria, or burning on urination.  REPRODUCTIVE: See HPI.   BREASTS: The patient performs breast self-examination and denies pain, lumps, or nipple discharge.   HEMATOLOGIC: No easy bruisability or excessive bleeding.  MUSCULOSKELETAL: Denies joint pain or swelling.   NEUROLOGIC: Denies syncope or weakness.   PSYCHIATRIC: Denies depression, anxiety or mood swings.    Physical Exam:    virtual    ASSESSMENT AND PLAN  1. Fibroids        2. Pelvic pain in female              Discussed fibroid treatment options- Possible UAE, VS myomectomy vs HYST. Treat menorrhagia with OCPs, Lysteda or medical management.   Risks and benefits of fibroid procedures discussed.   Will consider options and notify the office once able to review options.        Patient was counseled today on options  F/U for a RA SHE BS

## 2024-11-25 ENCOUNTER — OFFICE VISIT (OUTPATIENT)
Dept: SLEEP MEDICINE | Facility: CLINIC | Age: 44
End: 2024-11-25
Payer: COMMERCIAL

## 2024-11-25 DIAGNOSIS — F51.09 OTHER INSOMNIA NOT DUE TO A SUBSTANCE OR KNOWN PHYSIOLOGICAL CONDITION: ICD-10-CM

## 2024-11-25 DIAGNOSIS — G47.33 OSA (OBSTRUCTIVE SLEEP APNEA): Primary | ICD-10-CM

## 2024-11-25 PROCEDURE — 1160F RVW MEDS BY RX/DR IN RCRD: CPT | Mod: CPTII,95,, | Performed by: PHYSICIAN ASSISTANT

## 2024-11-25 PROCEDURE — 3044F HG A1C LEVEL LT 7.0%: CPT | Mod: CPTII,95,, | Performed by: PHYSICIAN ASSISTANT

## 2024-11-25 PROCEDURE — 1159F MED LIST DOCD IN RCRD: CPT | Mod: CPTII,95,, | Performed by: PHYSICIAN ASSISTANT

## 2024-11-25 PROCEDURE — 4010F ACE/ARB THERAPY RXD/TAKEN: CPT | Mod: CPTII,95,, | Performed by: PHYSICIAN ASSISTANT

## 2024-11-25 PROCEDURE — 99213 OFFICE O/P EST LOW 20 MIN: CPT | Mod: 95,,, | Performed by: PHYSICIAN ASSISTANT

## 2024-11-25 NOTE — PROGRESS NOTES
The chief complaint leading to consultation is: NASIM     Visit type: audiovisual     The patient location is: [place]     16 minutes of total time spent on the encounter, which includes face to face time and non-face to face time preparing to see the patient (eg, review of tests), Obtaining and/or reviewing separately obtained history, Documenting clinical information in the electronic or other health record, Independently interpreting results (not separately reported) and communicating results to the patient/family/caregiver, or Care coordination (not separately reported).      Each patient to whom he or she provides medical services by telemedicine is:  (1) informed of the relationship between the physician and patient and the respective role of any other health care provider with respect to management of the patient; and (2) notified that he or she may decline to receive medical services by telemedicine and may withdraw from such care at any time.        Referred by No ref. provider found     ESTABLISHED PATIENT VISIT  New to me. Previously followed with Dr Paz, last visit 12/29/21    Chasity Kwan  is a pleasant 44 y.o. female  with PMH significant for HTN, HLD, GERD, vit D def, Vit B 12 def, anxiety, NASIM who presents for NASIM management       Here today for: CPAP follow-up     PLAN last visit 12/29/21:   Due to listed symptoms, a polysomnogram is recommended and ordered. Description of procedure given to patient. If significant Obstructive Sleep Apnea (NASIM) is found during the initial portion of the study, therapy will be initiated with nasal Continuous Positive Airway Pressure (CPAP). Goals of therapy were discussed, alternative treatments listed and patient agrees to this form of therapy if indicated. The pathophysiology of NASIM was discussed. The effects of NASIM on patient's co-morbid conditions and the increased morbidity and/or mortality associated with this condition were reviewed. The  patient was given open opportunity to ask questions and/or express concerns about treatment plan. All questions/concerns were discussed. Driving precautions were provided.       Since last visit:   Reports was using CPAP with improvement in sx. States previous provider no longer with [facility] and she has been unable to get supplies for CPAP machine, which has negatively impacted usage of CPAP. Presents today to re-establish care with sleep medicine and get orders for CPAP supplies so she can resume nightly usage.      PAP history   Problems No supplies   Mask    Pressure 5-11cwp   DME HME   Machine age AS 11 4/7/22   Download N/a         Past Medical History:   Diagnosis Date    Abnormal Pap smear 2009    colpo    Genital herpes in women     H/O: myomectomy     EX lap    History of uterine fibroid     Hyperlipidemia     Hypertension     Morbid obesity     Pseudotumor cerebri      Patient Active Problem List   Diagnosis    Hyperlipidemia    Hypertension    Right leg pain    Obesity    Anxiety    Vitamin D deficiency disease    Vitamin B12 deficiency    Sacroiliac pain    History of uterine fibroid    GERD (gastroesophageal reflux disease)    History of laparoscopic partial gastrectomy    Fatigue    Chronic right shoulder pain    Decreased functional activity tolerance       Current Outpatient Medications:     ARIPiprazole (ABILIFY) 2 MG Tab, Take 2 mg by mouth., Disp: , Rfl:     clindamycin (CLEOCIN T) 1 % lotion, Apply to affected areas of face BID prn acne., Disp: 60 mL, Rfl: 3    clonazePAM (KLONOPIN) 1 MG tablet, Take 1 mg by mouth 2 (two) times daily as needed., Disp: , Rfl:     dapsone-spironolactone-niacin 8.5-5-2 % Gel, Apply to face once daily., Disp: 30 g, Rfl: 5    EScitalopram oxalate (LEXAPRO) 20 MG tablet, , Disp: , Rfl:     gabapentin (NEURONTIN) 300 MG capsule, Take 1 capsule (300 mg total) by mouth 2 (two) times daily., Disp: 60 capsule, Rfl: 0    losartan (COZAAR) 25 MG tablet, TAKE 1 TABLET BY MOUTH  ONCE  DAILY, Disp: 90 tablet, Rfl: 3    methocarbamoL (ROBAXIN) 750 MG Tab, Take 1 tablet (750 mg total) by mouth 3 (three) times daily as needed (muscle spasm)., Disp: 30 tablet, Rfl: 0    multivitamin (THERAGRAN) per tablet, Take 1 tablet by mouth once daily. Patient to stop[ 1 week before surgery starting 3/18/15, Disp: , Rfl:     omeprazole (PRILOSEC) 40 MG capsule, Take 1 capsule (40 mg total) by mouth every morning. Open capsule and take contents by mouth, Disp: 30 capsule, Rfl: 11    propranoloL (INDERAL) 10 MG tablet, , Disp: , Rfl:     rosuvastatin (CRESTOR) 40 MG Tab, TAKE 1 TABLET BY MOUTH ONCE  DAILY, Disp: 90 tablet, Rfl: 3    sumatriptan (IMITREX) 50 MG tablet, Once for severe headache. May repeat once after 2 hours. Do not exceed 3-4 doses in one week. NO FURTHER REFILL WITHOUT APT, Disp: 12 tablet, Rfl: 0    terbinafine HCL (LAMISIL) 250 mg tablet, TAKE 1 TABLET BY MOUTH ONCE DAILY, Disp: 30 tablet, Rfl: 1    tirzepatide 10 mg/0.5 mL PnIj, Inject 10 mg into the skin every 7 days., Disp: , Rfl:     tretinoin (RETIN-A) 0.05 % cream, Apply a pea-sized amount to entire face qhs., Disp: 20 g, Rfl: 5    valACYclovir (VALTREX) 500 MG tablet, TAKE 1 TABLET EVERY DAY AS NEEDED, Disp: 60 tablet, Rfl: 8    zolpidem (AMBIEN CR) 6.25 MG CR tablet, Take 1 tablet by mouth every evening. , Disp: , Rfl: 2     There were no vitals filed for this visit.    Physical Exam:    GEN:   Well-appearing  Psych:  Appropriate affect, demonstrates insight  SKIN:  No rash on the face or bridge of the nose      LABS:   Lab Results   Component Value Date    HGB 11.6 (L) 07/11/2024    CO2 23 10/24/2024         RECORDS REVIEWED:    1/6/22 HST: AHI 10, RDI 19    Previous sleep note: 12/29/21    ASSESSMENT        11/22/2024     7:31 AM   EPWORTH SLEEPINESS SCALE   Sitting and reading 1    Watching TV 1    Sitting, inactive in a public place (e.g. a theatre or a meeting) 0    As a passenger in a car for an hour without a break 1    Lying  down to rest in the afternoon when circumstances permit 2    Sitting and talking to someone 0    Sitting quietly after a lunch without alcohol 0    In a car, while stopped for a few minutes in traffic 0    Total score 5        Patient-reported       PROBLEM DESCRIPTION/ Sx on Presentation Interval Hx STATUS   Hx NASIM   + snoring, + witnessed apneas  + wakes with dry mouth, + wakes feeling un-refreshed  HST 2022 AHI 10 Reports good usage and control of sx when has supplies for CPAP machine New to me   Daytime Sx   + sleepiness when inactive   ESS 10/24 on intake (reviewed from 12/29/21) Less sleepy when using CPAP New to me   Insomnia   Trouble falling asleep: 15-20mins  Arousals:         1-2  Hard to get back to sleep?: varies    Prior pertinent medications:   Current pertinent medications:   ambien 6.25mg, klonopin 1mg BID PRN Still having some sleep difficulty New to me   Other issues:       PLAN      -using and benefiting from CPAP machine when has access to supplies  -resume nightly CPAP usage  -discussed goals of CPAP therapy 7-9hours nightly with usage of >6hrs 100% of nights and minimal usage of >4hrs 70% of nights recommended   -CPAP supplies ordered  -discussed NASIM and PAP with patient in detail, including possible complications of untreated NASIM like heart attack/stroke  -advised on strict driving precautions; advised never to drive drowsy     Advised on plan of care. Answered all patient questions. Patient verbalized understanding and voiced agreement with plan of care.     RTC  6 months or as needed      The patient was given open opportunity to ask questions and/or express concerns about treatment plan. All questions/concerns were discussed.     Two patient identifiers used prior to evaluation.

## 2024-12-04 ENCOUNTER — TELEPHONE (OUTPATIENT)
Dept: OBSTETRICS AND GYNECOLOGY | Facility: CLINIC | Age: 44
End: 2024-12-04
Payer: COMMERCIAL

## 2024-12-04 NOTE — TELEPHONE ENCOUNTER
Called patient. No answer. Left voice message for patient to call the office.     Patient would like to schedule surgery.

## 2024-12-09 ENCOUNTER — PATIENT MESSAGE (OUTPATIENT)
Dept: OBSTETRICS AND GYNECOLOGY | Facility: CLINIC | Age: 44
End: 2024-12-09
Payer: COMMERCIAL

## 2024-12-20 DIAGNOSIS — B00.9 HERPES: ICD-10-CM

## 2024-12-20 RX ORDER — VALACYCLOVIR HYDROCHLORIDE 500 MG/1
TABLET, FILM COATED ORAL
Qty: 90 TABLET | Refills: 1 | Status: SHIPPED | OUTPATIENT
Start: 2024-12-20

## 2024-12-20 NOTE — TELEPHONE ENCOUNTER
Refill Decision Note   Chasity Aquiles  is requesting a refill authorization.  Brief Assessment and Rationale for Refill:  Approve     Medication Therapy Plan:         Comments:     Note composed:3:40 PM 12/20/2024

## 2024-12-20 NOTE — TELEPHONE ENCOUNTER
No care due was identified.  [facility] Embedded Care Due Messages. Reference number: [id number].   12/20/2024 10:37:20 AM CST

## 2024-12-24 RX ORDER — METRONIDAZOLE 7.5 MG/G
1 GEL VAGINAL 2 TIMES DAILY
Qty: 70 G | Refills: 0 | Status: SHIPPED | OUTPATIENT
Start: 2024-12-24

## 2024-12-24 NOTE — TELEPHONE ENCOUNTER
Prescription requested for BV. Patient states that she has a vaginal odor with thin watery discharge. Prefers Metrogel.

## 2024-12-31 ENCOUNTER — TELEPHONE (OUTPATIENT)
Dept: ORTHOPEDICS | Facility: CLINIC | Age: 44
End: 2024-12-31
Payer: COMMERCIAL

## 2024-12-31 NOTE — TELEPHONE ENCOUNTER
LVM for patient to set up an appt with an DIANE on [facility] for knee pain. Left name and number and also messaged patient back on portal

## 2025-01-02 ENCOUNTER — HOSPITAL ENCOUNTER (OUTPATIENT)
Dept: RADIOLOGY | Facility: HOSPITAL | Age: 45
Discharge: HOME | End: 2025-01-02
Payer: COMMERCIAL

## 2025-01-02 ENCOUNTER — OFFICE VISIT (OUTPATIENT)
Dept: ORTHOPEDICS | Facility: CLINIC | Age: 45
End: 2025-01-02
Payer: COMMERCIAL

## 2025-01-02 ENCOUNTER — TELEPHONE (OUTPATIENT)
Dept: OBSTETRICS AND GYNECOLOGY | Facility: CLINIC | Age: 45
End: 2025-01-02
Payer: COMMERCIAL

## 2025-01-02 DIAGNOSIS — M17.11 OSTEOARTHRITIS OF RIGHT PATELLOFEMORAL JOINT: Primary | ICD-10-CM

## 2025-01-02 DIAGNOSIS — R10.2 PELVIC PAIN IN FEMALE: ICD-10-CM

## 2025-01-02 DIAGNOSIS — M25.569 KNEE PAIN, UNSPECIFIED CHRONICITY, UNSPECIFIED LATERALITY: ICD-10-CM

## 2025-01-02 DIAGNOSIS — D21.9 FIBROIDS: Primary | ICD-10-CM

## 2025-01-02 PROCEDURE — 73560 X-RAY EXAM OF KNEE 1 OR 2: CPT | Mod: TC,LT

## 2025-01-02 PROCEDURE — 1159F MED LIST DOCD IN RCRD: CPT | Mod: CPTII,S$GLB,,

## 2025-01-02 PROCEDURE — 97110 THERAPEUTIC EXERCISES: CPT | Mod: S$GLB,,,

## 2025-01-02 PROCEDURE — 99213 OFFICE O/P EST LOW 20 MIN: CPT | Mod: S$GLB,,,

## 2025-01-02 PROCEDURE — 99999 PR PBB SHADOW E&M-EST. PATIENT-LVL III: CPT | Mod: PBBFAC,,,

## 2025-01-02 PROCEDURE — 1160F RVW MEDS BY RX/DR IN RCRD: CPT | Mod: CPTII,S$GLB,,

## 2025-01-03 NOTE — PROGRESS NOTES
SUBJECTIVE:     Chief Complaint & History of Present Illness:  Chasity Kwan is a 44 y.o. female who is seen here today with a complaint of right knee pain. The pain has been present for about 3 weeks. The patient describes the pain as a mild-to-moderate dull ache located in the anterior knee. The pain is worse with long periods of walking/standing and improved by ice and rest. The patient notes mild effusion but no associated injury, knee giving out, knee locking, crepitus sensation.  She denies attempting any OTC NSAIDs or acetaminophen for pain.      Past Medical History:   Diagnosis Date    Abnormal Pap smear 2009    colpo    Genital herpes in women     H/O: myomectomy     EX lap    History of uterine fibroid     Hyperlipidemia     Hypertension     Morbid obesity     Pseudotumor cerebri        Past Surgical History:   Procedure Laterality Date    COLONOSCOPY N/A 12/12/2022    Procedure: COLONOSCOPY;  Surgeon: Michelle Denton MD;  Location: [facility] ([address] [place] [address]);  Service: Endoscopy;  Laterality: N/A;  instr portal-GT  left vm 12/5 mal    ESOPHAGOGASTRODUODENOSCOPY      GASTRECTOMY      gastric sleeve  2015    LAPAROTOMY      SI joint injection         Family History   Problem Relation Name Age of Onset    Hypertension Mother      Hyperlipidemia Mother      Graves' disease Mother      Obesity Mother      Hyperlipidemia Sister      Obesity Sister      Colon cancer Father  50    Cataracts Father      Cancer Father          colon, liver, and stomach    Heart disease Maternal Grandfather      Stroke Maternal Grandmother      Diabetes Paternal Grandmother      No Known Problems Paternal Grandfather      Breast cancer Neg Hx      Ovarian cancer Neg Hx         Review of patient's allergies indicates:  No Known Allergies        Current Outpatient Medications:     ARIPiprazole (ABILIFY) 2 MG Tab, Take 2 mg by mouth., Disp: , Rfl:     clindamycin (CLEOCIN T) 1 % lotion, Apply to affected areas of face BID  prn acne., Disp: 60 mL, Rfl: 3    clonazePAM (KLONOPIN) 1 MG tablet, Take 1 mg by mouth 2 (two) times daily as needed., Disp: , Rfl:     dapsone-spironolactone-niacin 8.5-5-2 % Gel, Apply to face once daily., Disp: 30 g, Rfl: 5    EScitalopram oxalate (LEXAPRO) 20 MG tablet, , Disp: , Rfl:     losartan (COZAAR) 25 MG tablet, TAKE 1 TABLET BY MOUTH ONCE  DAILY, Disp: 90 tablet, Rfl: 3    metroNIDAZOLE (METROGEL) 0.75 % (37.5mg/5 gram) vaginal gel, Place 1 applicator vaginally 2 (two) times daily., Disp: 70 g, Rfl: 0    multivitamin (THERAGRAN) per tablet, Take 1 tablet by mouth once daily. Patient to stop[ 1 week before surgery starting 3/18/15, Disp: , Rfl:     propranoloL (INDERAL) 10 MG tablet, , Disp: , Rfl:     rosuvastatin (CRESTOR) 40 MG Tab, TAKE 1 TABLET BY MOUTH ONCE  DAILY, Disp: 90 tablet, Rfl: 3    sumatriptan (IMITREX) 50 MG tablet, Once for severe headache. May repeat once after 2 hours. Do not exceed 3-4 doses in one week. NO FURTHER REFILL WITHOUT APT, Disp: 12 tablet, Rfl: 0    tirzepatide 10 mg/0.5 mL PnIj, Inject 10 mg into the skin every 7 days., Disp: , Rfl:     tretinoin (RETIN-A) 0.05 % cream, Apply a pea-sized amount to entire face qhs., Disp: 20 g, Rfl: 5    valACYclovir (VALTREX) 500 MG tablet, TAKE 1 TABLET EVERY DAY AS NEEDED, Disp: 90 tablet, Rfl: 1    zolpidem (AMBIEN CR) 6.25 MG CR tablet, Take 1 tablet by mouth every evening. , Disp: , Rfl: 2    omeprazole (PRILOSEC) 40 MG capsule, Take 1 capsule (40 mg total) by mouth every morning. Open capsule and take contents by mouth, Disp: 30 capsule, Rfl: 11      Review of Systems:  ROS:  The updated medical history is in the chart and has been reviewed. A review of systems is updated and there is no reported vision changes, ear/nose/mouth/throat complaints, chest pain, shortness of breath, abdominal pain, urological complaints, fevers or chills, psychiatric complaints. Musculoskeletal and neurologcial symptoms are as documented. All other  systems are negative.      OBJECTIVE:     PHYSICAL EXAM:  There were no vitals taken for this visit.  General: Pleasant, cooperative, NAD.  HEENT: NCAT, sclera nonicteric.  Lungs: Respirations are equal and unlabored.   Abdomen: Soft and non-tender.  CV: 2+ bilateral upper and lower extremity pulses.  Neuro: Sensation intact to light touch.  Skin: Intact throughout LE with no rashes, erythema, or lesions.  Extremities: No LE edema, NVI lower extremities. normal gait.    right Knee Exam:  Knee Range of Motion:  0-125   Effusion:  Mild  Condition of skin: intact  Location of tenderness:  Patellofemoral   Strength: 5/5 quadriceps strength, 5/5 gastroc-soleus strength, 5/5 hamstring strength, and 5/5 tibialis anterior strength  Stability: stable to testing  Knee Alignment: normal  Giuseppe: negative    left Knee Exam:  Knee Range of Motion:  0-130   Effusion: none  Condition of skin: intact  Location of tenderness: None   Strength: 5/5 quadriceps strength, 5/5 gastroc-soleus strength, 5/5 hamstring strength, and 5/5 tibialis anterior strength  Knee alignment: normal  Stability: stable to testing  Giuseppe: negative      RADIOGRAPHS:  X-rays of the right knee taken today personally reviewed. Imaging reveals patellofemoral joint space narrowing with no acute fractures or dislocations.      ASSESSMENT:       ICD-10-CM ICD-9-CM   1. Osteoarthritis of right patellofemoral joint  M17.11 715.36       PLAN:     We discussed with the patient at length all the different treatment options available including anti-inflammatories, acetaminophen, rest, ice, knee strengthening exercise, occasional cortisone injections for temporary relief, Viscosupplimentation injections, arthroscopic debridement, osteotomy, and finally knee arthroplasty.     Patient elected to treat conservatively via rest, ice, compression, elevation, and OTC NSAIDs/acetaminophen as needed for pain.    Patient denied outpatient physical therapy and instead elected  to receive knee home exercise program. A total of 15 minutes was spent instructing and demonstrating the home exercise program with the patient. she demonstrated understanding of the home exercise program.    Follow up in clinic as needed.      DISCLAIMER: This note was prepared with [employer] voice recognition transcription software. Garbled syntax, mangled pronouns, and other bizarre constructions may be attributed to that software system.    Seamus Delgado Jr., PA-C

## 2025-02-13 ENCOUNTER — PATIENT MESSAGE (OUTPATIENT)
Dept: OBSTETRICS AND GYNECOLOGY | Facility: CLINIC | Age: 45
End: 2025-02-13
Payer: COMMERCIAL

## 2025-03-31 ENCOUNTER — OFFICE VISIT (OUTPATIENT)
Dept: INTERNAL MEDICINE | Facility: CLINIC | Age: 45
End: 2025-03-31
Payer: COMMERCIAL

## 2025-03-31 VITALS
WEIGHT: 158.06 LBS | BODY MASS INDEX: 31.86 KG/M2 | DIASTOLIC BLOOD PRESSURE: 72 MMHG | HEART RATE: 73 BPM | HEIGHT: 59 IN | SYSTOLIC BLOOD PRESSURE: 116 MMHG | OXYGEN SATURATION: 97 %

## 2025-03-31 DIAGNOSIS — Z98.84 HISTORY OF BARIATRIC SURGERY: ICD-10-CM

## 2025-03-31 DIAGNOSIS — I10 PRIMARY HYPERTENSION: ICD-10-CM

## 2025-03-31 DIAGNOSIS — Z00.00 ANNUAL PHYSICAL EXAM: Primary | ICD-10-CM

## 2025-03-31 DIAGNOSIS — E78.5 HYPERLIPIDEMIA, UNSPECIFIED HYPERLIPIDEMIA TYPE: ICD-10-CM

## 2025-03-31 PROCEDURE — 3078F DIAST BP <80 MM HG: CPT | Mod: CPTII,S$GLB,, | Performed by: INTERNAL MEDICINE

## 2025-03-31 PROCEDURE — 3074F SYST BP LT 130 MM HG: CPT | Mod: CPTII,S$GLB,, | Performed by: INTERNAL MEDICINE

## 2025-03-31 PROCEDURE — 99396 PREV VISIT EST AGE 40-64: CPT | Mod: S$GLB,,, | Performed by: INTERNAL MEDICINE

## 2025-03-31 PROCEDURE — 1160F RVW MEDS BY RX/DR IN RCRD: CPT | Mod: CPTII,S$GLB,, | Performed by: INTERNAL MEDICINE

## 2025-03-31 PROCEDURE — 99999 PR PBB SHADOW E&M-EST. PATIENT-LVL IV: CPT | Mod: PBBFAC,,, | Performed by: INTERNAL MEDICINE

## 2025-03-31 PROCEDURE — 4010F ACE/ARB THERAPY RXD/TAKEN: CPT | Mod: CPTII,S$GLB,, | Performed by: INTERNAL MEDICINE

## 2025-03-31 PROCEDURE — 1159F MED LIST DOCD IN RCRD: CPT | Mod: CPTII,S$GLB,, | Performed by: INTERNAL MEDICINE

## 2025-03-31 PROCEDURE — 3008F BODY MASS INDEX DOCD: CPT | Mod: CPTII,S$GLB,, | Performed by: INTERNAL MEDICINE

## 2025-03-31 NOTE — PROGRESS NOTES
"    CHIEF COMPLAINT     Chief Complaint   Patient presents with    Annual Exam       HPI     Chasity Kwan is a 45 y.o. female  hypertension hyperlipidemia,anxiety high BMI with history of bariatric surgery  here today for here today for annual exam     Overall doing well. Has lost an additional 30lbs intentionally since last visit. She is on tirzep 15mg weekly. Getting compounded and still available thus far.  Blood pressure has been a little bit low after weight loss    Having hysterectomy over the summer for bleeding and cramping associated with fibroids.    Personally Reviewed Patient's Medical, surgical, family and social hx. Changes updated in [facility].  Care Team updated in Epic    Review of Systems:  Review of Systems   Neurological:  Positive for light-headedness. Negative for dizziness.       Health Maintenance:   Reviewed with patient  Due for the following:      PHYSICAL EXAM     /72 (BP Location: Right arm, Patient Position: Sitting)   Pulse 73   Ht 4' 11" (1.499 m)   Wt 71.7 kg (158 lb 1.1 oz)   SpO2 97%   BMI 31.93 kg/m²     Gen: Well Appearing, NAD  HEENT: PERR, EOMI  Neck: FROM, no thyromegaly, no cervical adenopathy  CVD: RRR, no M/R/G  Pulm: Normal work of breathing, CTAB, no wheezing  Abd:  Soft, NT, ND non TTP, no mass  MSK: no LE edema  Neuro: A&Ox3, gait normal, speech normal  Mood; Mood normal, behavior normal, thought process linear       LABS     Labs reviewed; Notable for    ASSESSMENT     1. Annual physical exam  CBC Auto Differential    Comprehensive Metabolic Panel    Hemoglobin A1C    Lipid Panel      2. History of bariatric surgery  Vitamin B12    Vitamin B6    Vitamin D    Vitamin B1    Ferritin    Iron and TIBC      3. Primary hypertension        4. Hyperlipidemia, unspecified hyperlipidemia type                Plan     Chasity Kwan is a 45 y.o. female with hypertension hyperlipidemia,anxiety high BMI with history of bariatric surgery  here today " for here today for annual exam   1. Annual physical exam  Updated problem list, medical history, care team and discussed HM.     - CBC Auto Differential; Future  - Comprehensive Metabolic Panel; Future  - Hemoglobin A1C; Future  - Lipid Panel; Future    2. History of bariatric surgery  Will get bariatric labs  - Vitamin B12; Future  - Vitamin B6; Future  - Vitamin D; Future  - Vitamin B1; Future  - Ferritin; Future  - Iron and TIBC; Future    3. Primary hypertension  Will try losartan holiday  Recheck blood pressure    4. Hyperlipidemia, unspecified hyperlipidemia type  Continue statin will titrate as needed    Odilon Alas MD

## 2025-04-02 ENCOUNTER — RESULTS FOLLOW-UP (OUTPATIENT)
Dept: INTERNAL MEDICINE | Facility: CLINIC | Age: 45
End: 2025-04-02

## 2025-04-02 ENCOUNTER — LAB VISIT (OUTPATIENT)
Dept: LAB | Facility: OTHER | Age: 45
End: 2025-04-02
Payer: COMMERCIAL

## 2025-04-02 DIAGNOSIS — Z00.00 ANNUAL PHYSICAL EXAM: ICD-10-CM

## 2025-04-02 DIAGNOSIS — Z98.84 HISTORY OF BARIATRIC SURGERY: ICD-10-CM

## 2025-04-02 LAB
25(OH)D3+25(OH)D2 SERPL-MCNC: 51 NG/ML (ref 30–96)
ABSOLUTE EOSINOPHIL (OHS): 0.08 K/UL
ABSOLUTE MONOCYTE (OHS): 0.44 K/UL (ref 0.3–1)
ABSOLUTE NEUTROPHIL COUNT (OHS): 3.14 K/UL (ref 1.8–7.7)
ALBUMIN SERPL BCP-MCNC: 3.7 G/DL (ref 3.5–5.2)
ALP SERPL-CCNC: 48 UNIT/L (ref 40–150)
ALT SERPL W/O P-5'-P-CCNC: 37 UNIT/L (ref 10–44)
ANION GAP (OHS): 8 MMOL/L (ref 8–16)
AST SERPL-CCNC: 33 UNIT/L (ref 11–45)
BASOPHILS # BLD AUTO: 0.03 K/UL
BASOPHILS NFR BLD AUTO: 0.6 %
BILIRUB SERPL-MCNC: 0.3 MG/DL (ref 0.1–1)
BUN SERPL-MCNC: 12 MG/DL (ref 6–20)
CALCIUM SERPL-MCNC: 9 MG/DL (ref 8.7–10.5)
CHLORIDE SERPL-SCNC: 110 MMOL/L (ref 95–110)
CHOLEST SERPL-MCNC: 181 MG/DL (ref 120–199)
CHOLEST/HDLC SERPL: 2.9 {RATIO} (ref 2–5)
CO2 SERPL-SCNC: 25 MMOL/L (ref 23–29)
CREAT SERPL-MCNC: 0.7 MG/DL (ref 0.5–1.4)
EAG (OHS): 94 MG/DL (ref 68–131)
ERYTHROCYTE [DISTWIDTH] IN BLOOD BY AUTOMATED COUNT: 14.5 % (ref 11.5–14.5)
FERRITIN SERPL-MCNC: 27 NG/ML (ref 20–300)
GFR SERPLBLD CREATININE-BSD FMLA CKD-EPI: >60 ML/MIN/1.73/M2
GLUCOSE SERPL-MCNC: 93 MG/DL (ref 70–110)
HBA1C MFR BLD: 4.9 % (ref 4–5.6)
HCT VFR BLD AUTO: 35.3 % (ref 37–48.5)
HDLC SERPL-MCNC: 62 MG/DL (ref 40–75)
HDLC SERPL: 34.3 % (ref 20–50)
HGB BLD-MCNC: 11.7 GM/DL (ref 12–16)
IMM GRANULOCYTES # BLD AUTO: 0.01 K/UL (ref 0–0.04)
IMM GRANULOCYTES NFR BLD AUTO: 0.2 % (ref 0–0.5)
IRON SATN MFR SERPL: 17 % (ref 20–50)
IRON SERPL-MCNC: 54 UG/DL (ref 30–160)
LDLC SERPL CALC-MCNC: 106.6 MG/DL (ref 63–159)
LYMPHOCYTES # BLD AUTO: 1.38 K/UL (ref 1–4.8)
MCH RBC QN AUTO: 29.8 PG (ref 27–31)
MCHC RBC AUTO-ENTMCNC: 33.1 G/DL (ref 32–36)
MCV RBC AUTO: 90 FL (ref 82–98)
NONHDLC SERPL-MCNC: 119 MG/DL
NUCLEATED RBC (/100WBC) (OHS): 0 /100 WBC
PLATELET # BLD AUTO: 268 K/UL (ref 150–450)
PMV BLD AUTO: 11.8 FL (ref 9.2–12.9)
POTASSIUM SERPL-SCNC: 4 MMOL/L (ref 3.5–5.1)
PROT SERPL-MCNC: 7.1 GM/DL (ref 6–8.4)
RBC # BLD AUTO: 3.92 M/UL (ref 4–5.4)
RELATIVE EOSINOPHIL (OHS): 1.6 %
RELATIVE LYMPHOCYTE (OHS): 27.2 % (ref 18–48)
RELATIVE MONOCYTE (OHS): 8.7 % (ref 4–15)
RELATIVE NEUTROPHIL (OHS): 61.7 % (ref 38–73)
SODIUM SERPL-SCNC: 143 MMOL/L (ref 136–145)
TIBC SERPL-MCNC: 323 UG/DL (ref 250–450)
TRANSFERRIN SERPL-MCNC: 218 MG/DL (ref 200–375)
TRIGL SERPL-MCNC: 62 MG/DL (ref 30–150)
VIT B12 SERPL-MCNC: >2000 PG/ML (ref 210–950)
WBC # BLD AUTO: 5.08 K/UL (ref 3.9–12.7)

## 2025-04-02 PROCEDURE — 84207 ASSAY OF VITAMIN B-6: CPT

## 2025-04-02 PROCEDURE — 84425 ASSAY OF VITAMIN B-1: CPT

## 2025-04-02 PROCEDURE — 82607 VITAMIN B-12: CPT

## 2025-04-02 PROCEDURE — 82306 VITAMIN D 25 HYDROXY: CPT

## 2025-04-02 PROCEDURE — 83036 HEMOGLOBIN GLYCOSYLATED A1C: CPT

## 2025-04-02 PROCEDURE — 82728 ASSAY OF FERRITIN: CPT

## 2025-04-02 PROCEDURE — 36415 COLL VENOUS BLD VENIPUNCTURE: CPT

## 2025-04-02 PROCEDURE — 80061 LIPID PANEL: CPT

## 2025-04-02 PROCEDURE — 85025 COMPLETE CBC W/AUTO DIFF WBC: CPT

## 2025-04-02 PROCEDURE — 84466 ASSAY OF TRANSFERRIN: CPT

## 2025-04-02 PROCEDURE — 80053 COMPREHEN METABOLIC PANEL: CPT

## 2025-04-03 ENCOUNTER — TELEPHONE (OUTPATIENT)
Dept: OBSTETRICS AND GYNECOLOGY | Facility: CLINIC | Age: 45
End: 2025-04-03
Payer: COMMERCIAL

## 2025-04-03 NOTE — TELEPHONE ENCOUNTER
I called pt in regards to rescheduling her appts as Dr. Butt will be out on the original appt day. Pt did not answer, however I left pt a detailed VM letting her know I have went ahead and moved her appt to June 9th.

## 2025-04-08 LAB — W VITAMIN B6: 22 UG/L

## 2025-04-09 LAB — W VITAMIN B1: 45 UG/L

## 2025-04-14 ENCOUNTER — OFFICE VISIT (OUTPATIENT)
Dept: ORTHOPEDICS | Facility: CLINIC | Age: 45
End: 2025-04-14
Payer: COMMERCIAL

## 2025-04-14 ENCOUNTER — HOSPITAL ENCOUNTER (OUTPATIENT)
Dept: RADIOLOGY | Facility: HOSPITAL | Age: 45
Discharge: HOME | End: 2025-04-14
Payer: COMMERCIAL

## 2025-04-14 DIAGNOSIS — G89.29 CHRONIC LEFT-SIDED LOW BACK PAIN, UNSPECIFIED WHETHER SCIATICA PRESENT: ICD-10-CM

## 2025-04-14 DIAGNOSIS — G89.29 CHRONIC LEFT SI JOINT PAIN: Primary | ICD-10-CM

## 2025-04-14 DIAGNOSIS — M53.3 CHRONIC LEFT SI JOINT PAIN: Primary | ICD-10-CM

## 2025-04-14 DIAGNOSIS — M54.50 CHRONIC LEFT-SIDED LOW BACK PAIN, UNSPECIFIED WHETHER SCIATICA PRESENT: ICD-10-CM

## 2025-04-14 PROCEDURE — 72110 X-RAY EXAM L-2 SPINE 4/>VWS: CPT | Mod: 26,,, | Performed by: RADIOLOGY

## 2025-04-14 PROCEDURE — 99213 OFFICE O/P EST LOW 20 MIN: CPT | Mod: S$GLB,,,

## 2025-04-14 PROCEDURE — 4010F ACE/ARB THERAPY RXD/TAKEN: CPT | Mod: CPTII,S$GLB,,

## 2025-04-14 PROCEDURE — 1160F RVW MEDS BY RX/DR IN RCRD: CPT | Mod: CPTII,S$GLB,,

## 2025-04-14 PROCEDURE — 3044F HG A1C LEVEL LT 7.0%: CPT | Mod: CPTII,S$GLB,,

## 2025-04-14 PROCEDURE — 99999 PR PBB SHADOW E&M-EST. PATIENT-LVL III: CPT | Mod: PBBFAC,,,

## 2025-04-14 PROCEDURE — 72110 X-RAY EXAM L-2 SPINE 4/>VWS: CPT | Mod: TC

## 2025-04-14 PROCEDURE — 1159F MED LIST DOCD IN RCRD: CPT | Mod: CPTII,S$GLB,,

## 2025-04-14 NOTE — PROGRESS NOTES
SUBJECTIVE:     History of Present Illness    CHIEF COMPLAINT:  - Left lower back pain    HPI:  Ms. Kwan presents with left-sided lower back pain ongoing for about three weeks. She describes the pain as a localized discomfort in the left side of the lower back, specifically in the area of the SI joint. Pain is particularly bothersome at night and during prolonged sitting at work. Sitting and lying down exacerbate the discomfort.    She has been managing symptoms with Aleve, heat, ice, and massage, but these interventions have not provided significant relief. She has been trying to maintain her exercise routine and continue walking despite the pain. She has an old prescription for methocarbamol which she finds effective for pain relief. She reports a history of SI joint inflammation on the right side in the past, for which she received an injection that was helpful. She has been attempting to alleviate current symptoms through yoga and other conservative measures, but these efforts have not been successful.    She denies any falls, heavy lifting, or specific injuries associated with the onset of pain. She denies any radiation of pain, numbness, tingling, or weakness in the left leg.    PREVIOUS TREATMENTS:  - Ms. Kwan has been using Aleve, heat, ice, and massage for about 3 weeks with minimal benefit  - Ms. Kwan has been trying to keep exercising and walking  - Ms. Kwan received an SI joint injection on the right side a while ago, which was helpful  - Ms. Kwan has been trying yoga with no benefit    MEDICATIONS:  - Aleve: Not providing significant benefit  - Methocarbamol: Providing benefit           Past Medical History:   Diagnosis Date    Abnormal Pap smear 2009    colpo    Genital herpes in women     H/O: myomectomy     EX lap    History of uterine fibroid     Hyperlipidemia     Hypertension     Morbid obesity     Pseudotumor cerebri        Past Surgical History:   Procedure Laterality Date     COLONOSCOPY N/A 12/12/2022    Procedure: COLONOSCOPY;  Surgeon: Michelle Denton MD;  Location: [facility] ([address] [place] [address]);  Service: Endoscopy;  Laterality: N/A;  instr portal-GT  left vm 12/5 mal    ESOPHAGOGASTRODUODENOSCOPY      GASTRECTOMY      gastric sleeve  2015    LAPAROTOMY      SI joint injection         Family History   Problem Relation Name Age of Onset    Hypertension Mother      Hyperlipidemia Mother      Graves' disease Mother      Obesity Mother      Hyperlipidemia Sister      Obesity Sister      Colon cancer Father  50    Cataracts Father      Cancer Father          colon, liver, and stomach    Heart disease Maternal Grandfather      Stroke Maternal Grandmother      Diabetes Paternal Grandmother      No Known Problems Paternal Grandfather      Breast cancer Neg Hx      Ovarian cancer Neg Hx         Review of patient's allergies indicates:  No Known Allergies      Current Medications[1]      Review of Systems:  ROS:  The updated medical history is in the chart and has been reviewed. A review of systems is updated and there is no reported vision changes, ear/nose/mouth/throat complaints, chest pain, shortness of breath, abdominal pain, urological complaints, fevers or chills, psychiatric complaints. Musculoskeletal and neurologcial symptoms are as documented. All other systems are negative.      OBJECTIVE:     PHYSICAL EXAM:  There were no vitals taken for this visit.  General: Pleasant, cooperative, NAD.  HEENT: NCAT, sclera nonicteric.  Lungs: Respirations are equal and unlabored.   Abdomen: Soft and non-tender.  CV: 2+ bilateral upper and lower extremity pulses.  Neuro: Sensation intact to light touch.  Skin: Intact throughout with no rashes, erythema, or lesions.  Extremities: No LE edema, NVI lower extremities. normal gait.    Back Exam:    Tenderness: Left SI joint   Swelling:  None       Range of Motion:      Flexion: 50     Extension: 10     Lateral Bend:   Right 10                               Left 10     Rotation:          Right 5                              Left 5       SLR:                  Right Negative                             Left Negative       Muscle Strength      Hip Flexion 5/5     Hip Extension 5/5     Abductor: 5/5     Adductor: 5/5     Quadriceps: 5/5     Hamstrings: 5/5     Gastrocnemius: 5/5     Anterior tibialis: 5/5       Reflexes     Patellar: Normal    Achilles: Normal       Sensation: Normal       left Hip Exam:  TTP: None  90 degrees flexion  10 degrees extension   10 degrees internal rotation  30 degrees external rotation  30 degrees abduction  20 degrees adduction   0 flexion contracture   negative SCOT   negative FADIR  negative [facility]    RADIOGRAPHS:  X-rays of the lumbar spine taken today personally reviewed. Imaging reveals well-aligned spine and maintained disc space with no acute fractures or dislocations.    ASSESSMENT/PLAN:       ICD-10-CM ICD-9-CM   1. Chronic left SI joint pain  M53.3 724.6    G89.29 338.29       ASSESSMENT/PLAN:     We discussed with the patient at length all the different treatment options available including anti-inflammatories, acetaminophen, rest, ice, physical therapy, strengthening and range of motion exercise, occasional cortisone injections for temporary relief, and finally possible surgical interventions.      MEDICATIONS:  - Take Tylenol as needed for pain.  - Take Aleve as needed for pain.    REFERRALS:  - Referred to Dr. Golden (pain management) for left SI joint injection.    PROCEDURES:  - Discussed and patient agreed to proceed with left SI joint injection.    FOLLOW UP:  - Follow up after receiving injection    PATIENT INSTRUCTIONS:  - Use heat or ice.  - Perform exercises for the lower back.         This note was generated with the assistance of ambient listening technology. Verbal consent was obtained by the patient and accompanying visitor(s) for the recording of patient appointment to facilitate this note. I attest to  having reviewed and edited the generated note for accuracy, though some syntax or spelling errors may persist. Please contact the author of this note for any clarification.       Seamus Delgado Jr., PA-C           [1]   Current Outpatient Medications:     ARIPiprazole (ABILIFY) 2 MG Tab, Take 2 mg by mouth., Disp: , Rfl:     clindamycin (CLEOCIN T) 1 % lotion, Apply to affected areas of face BID prn acne., Disp: 60 mL, Rfl: 3    clonazePAM (KLONOPIN) 1 MG tablet, Take 1 mg by mouth 2 (two) times daily as needed., Disp: , Rfl:     dapsone-spironolactone-niacin 8.5-5-2 % Gel, Apply to face once daily., Disp: 30 g, Rfl: 5    EScitalopram oxalate (LEXAPRO) 20 MG tablet, , Disp: , Rfl:     multivitamin (THERAGRAN) per tablet, Take 1 tablet by mouth once daily. Patient to stop[ 1 week before surgery starting 3/18/15, Disp: , Rfl:     propranoloL (INDERAL) 10 MG tablet, , Disp: , Rfl:     rosuvastatin (CRESTOR) 40 MG Tab, TAKE 1 TABLET BY MOUTH ONCE  DAILY, Disp: 90 tablet, Rfl: 3    sumatriptan (IMITREX) 50 MG tablet, Once for severe headache. May repeat once after 2 hours. Do not exceed 3-4 doses in one week. NO FURTHER REFILL WITHOUT APT, Disp: 12 tablet, Rfl: 0    tirzepatide 10 mg/0.5 mL PnIj, Inject 10 mg into the skin every 7 days., Disp: , Rfl:     tretinoin (RETIN-A) 0.05 % cream, Apply a pea-sized amount to entire face qhs., Disp: 20 g, Rfl: 5    valACYclovir (VALTREX) 500 MG tablet, TAKE 1 TABLET EVERY DAY AS NEEDED, Disp: 90 tablet, Rfl: 1    zolpidem (AMBIEN CR) 6.25 MG CR tablet, Take 1 tablet by mouth every evening. , Disp: , Rfl: 2    metroNIDAZOLE (METROGEL) 0.75 % (37.5mg/5 gram) vaginal gel, Place 1 applicator vaginally 2 (two) times daily., Disp: 70 g, Rfl: 0    omeprazole (PRILOSEC) 40 MG capsule, Take 1 capsule (40 mg total) by mouth every morning. Open capsule and take contents by mouth, Disp: 30 capsule, Rfl: 11

## 2025-04-15 ENCOUNTER — PATIENT MESSAGE (OUTPATIENT)
Dept: OBSTETRICS AND GYNECOLOGY | Facility: CLINIC | Age: 45
End: 2025-04-15
Payer: COMMERCIAL

## 2025-04-17 ENCOUNTER — TELEPHONE (OUTPATIENT)
Dept: OBSTETRICS AND GYNECOLOGY | Facility: CLINIC | Age: 45
End: 2025-04-17
Payer: COMMERCIAL

## 2025-04-17 NOTE — TELEPHONE ENCOUNTER
Is Dr. Butt able to call in medicine to stop/slow bleeding for the next three months? We discussed that as an option  in my last visit with her. If so can you call that into [facility] on [address].  Thank you,  Stefany Kwan

## 2025-04-21 NOTE — TELEPHONE ENCOUNTER
I can send in provera 10 mg daily or birth control daily   Would you like me to send in the Rx for either one?  Caitlyn TEIXEIRA

## 2025-04-22 RX ORDER — MEDROXYPROGESTERONE ACETATE 10 MG/1
10 TABLET ORAL DAILY
Qty: 30 TABLET | Refills: 6 | Status: SHIPPED | OUTPATIENT
Start: 2025-04-22 | End: 2025-04-23 | Stop reason: SDUPTHER

## 2025-04-23 RX ORDER — MEDROXYPROGESTERONE ACETATE 10 MG/1
10 TABLET ORAL DAILY
Qty: 30 TABLET | Refills: 6 | Status: SHIPPED | OUTPATIENT
Start: 2025-04-23 | End: 2026-04-23

## 2025-05-01 ENCOUNTER — OFFICE VISIT (OUTPATIENT)
Dept: PAIN MEDICINE | Facility: CLINIC | Age: 45
End: 2025-05-01
Payer: COMMERCIAL

## 2025-05-01 VITALS
WEIGHT: 152.13 LBS | OXYGEN SATURATION: 100 % | DIASTOLIC BLOOD PRESSURE: 74 MMHG | BODY MASS INDEX: 30.67 KG/M2 | SYSTOLIC BLOOD PRESSURE: 104 MMHG | HEART RATE: 70 BPM | HEIGHT: 59 IN | RESPIRATION RATE: 18 BRPM | TEMPERATURE: 98 F

## 2025-05-01 DIAGNOSIS — G89.29 CHRONIC LEFT SI JOINT PAIN: ICD-10-CM

## 2025-05-01 DIAGNOSIS — M53.3 CHRONIC LEFT SI JOINT PAIN: ICD-10-CM

## 2025-05-01 DIAGNOSIS — M46.1 SACROILIITIS: Primary | ICD-10-CM

## 2025-05-01 PROCEDURE — 99999 PR PBB SHADOW E&M-EST. PATIENT-LVL V: CPT | Mod: PBBFAC,,, | Performed by: ANESTHESIOLOGY

## 2025-05-01 NOTE — H&P (VIEW-ONLY)
Chronic Pain - New Consult    Referring Physician: Seamus Delgado PA-C    Chief Complaint:   Chief Complaint   Patient presents with    Low-back Pain        SUBJECTIVE:    Chasity Kwan presents to the clinic for the evaluation of left lower back pain. The pain started a month and a half ago following no inciting event and symptoms have been worsening.The pain is located in the left lower back area and does not radiate.  The pain is described as aching and throbbing and is rated as 5/10. The pain is rated with a score of  3/10 on the BEST day and a score of 8/10 on the WORST day.  Symptoms interfere with daily activity, sleeping, and work. The pain is exacerbated by Sitting, Standing, and Walking.  The pain is mitigated by heat, ice, laying down, and medications. She reports spending 5 hours per day reclining. The patient reports 8 hours of uninterrupted sleep per night.    Patient, originally seen in this clinic in 2013 for left SI joint pain, underwent a left SI joint injection which provided 100% relief. She returned in 2023 with shoulder pain, receiving trigger point injections that provided relief. Recently, she was referred back by her orthopedist due to a recurrence of left SI joint pain. The current episode started approximately six weeks ago without any inciting events. She reports left lower back pain that worsens with activity and sitting, but improves when lying on her right side. Pain is relieved somewhat by Aleve and Robaxin. She has been performing a home exercise program focused on yoga and SI joint exercises for a while, with minimal relief. She is interested in repeating the left SI joint injection.    She denies any shooting pain, numbness, tingling, or weakness.    Patient denies night fever/night sweats, urinary incontinence, bowel incontinence, significant weight loss, significant motor weakness, and loss of sensations.    Physical Therapy/Home Exercise: yes - HEP             Pain Disability Index Review:      5/1/2025     8:22 AM 3/9/2023     9:51 AM 7/7/2022     8:21 AM   Last 3 PDI Scores   Pain Disability Index (PDI) 7 24 0       Pain Medications:    Aleve   Robaxin      report:  Reviewed and consistent with medication use as prescribed.    Pain Procedures:   7/9/13 - L SI Joint injection     Imaging:   XR LUMBAR SPINE AP AND LAT WITH FLEX/EXT 4/14/25     CLINICAL HISTORY:  Low back pain, unspecified     TECHNIQUE:  AP and lateral views as well as lateral flexion and extension images are performed through the lumbar spine.     COMPARISON:  Non 09/19/2024 e     FINDINGS:  The disc spaces are well maintained.  No fracture, spondylolisthesis or bone destruction identified     Impression:     See above    MRI LUMBAR SPINE WO CONTRAST 7/2019    CLINICAL HISTORY: M51.26 Other intervertebral disc displacement  lumbar region, PAIN     Technique: Routine unenhanced MRI of the lumbar spine.     Comparison: None.     Findings:     For the purposes of this dictation, the last well-defined interspace is called L5-S1.   There is anatomic alignment of the lumbar spine.    Lumbar vertebrae and intervertebral discs are normal in height.       L1-2: No significant spinal canal stenosis or neural foraminal narrowing.   L2-3: No significant spinal canal stenosis or neural foraminal narrowing.   L3-4: No significant spinal canal stenosis or neural foraminal narrowing.   L4-5: Mild disc bulge with central disc protrusion. No significant canal stenosis. No significant neural foraminal narrowing.   L5-S1: Mild disc bulge. No significant canal stenosis. No significant neural foraminal narrowing.     Normal appearance and position of the terminal spinal cord.       Impression:     L4-L5 central disc protrusion. No significant lumbar spinal stenosis or neural foraminal narrowing.     Past Medical History:   Diagnosis Date    Abnormal Pap smear 2009    colpo    Genital herpes in women     H/O:  myomectomy     EX lap    History of uterine fibroid     Hyperlipidemia     Hypertension     Morbid obesity     Pseudotumor cerebri      Past Surgical History:   Procedure Laterality Date    COLONOSCOPY N/A 12/12/2022    Procedure: COLONOSCOPY;  Surgeon: Michelle Denton MD;  Location: [facility] (4TH FLR);  Service: Endoscopy;  Laterality: N/A;  instr portal-GT  left vm 12/5 mal    ESOPHAGOGASTRODUODENOSCOPY      GASTRECTOMY      gastric sleeve  2015    LAPAROTOMY      SI joint injection       Social History[1]  Family History   Problem Relation Name Age of Onset    Hypertension Mother      Hyperlipidemia Mother      Graves' disease Mother      Obesity Mother      Hyperlipidemia Sister      Obesity Sister      Colon cancer Father  50    Cataracts Father      Cancer Father          colon, liver, and stomach    Heart disease Maternal Grandfather      Stroke Maternal Grandmother      Diabetes Paternal Grandmother      No Known Problems Paternal Grandfather      Breast cancer Neg Hx      Ovarian cancer Neg Hx         Review of patient's allergies indicates:  No Known Allergies    Current Outpatient Medications   Medication Sig    ARIPiprazole (ABILIFY) 2 MG Tab Take 2 mg by mouth.    clindamycin (CLEOCIN T) 1 % lotion Apply to affected areas of face BID prn acne.    clonazePAM (KLONOPIN) 1 MG tablet Take 1 mg by mouth 2 (two) times daily as needed.    dapsone-spironolactone-niacin 8.5-5-2 % Gel Apply to face once daily.    EScitalopram oxalate (LEXAPRO) 20 MG tablet     medroxyPROGESTERone (PROVERA) 10 MG tablet Take 1 tablet (10 mg total) by mouth once daily.    multivitamin (THERAGRAN) per tablet Take 1 tablet by mouth once daily. Patient to stop[ 1 week before surgery starting 3/18/15    omeprazole (PRILOSEC) 40 MG capsule Take 1 capsule (40 mg total) by mouth every morning. Open capsule and take contents by mouth    propranoloL (INDERAL) 10 MG tablet     rosuvastatin (CRESTOR) 40 MG Tab TAKE 1 TABLET BY MOUTH ONCE  " DAILY    tirzepatide 10 mg/0.5 mL PnIj Inject 10 mg into the skin every 7 days.    tretinoin (RETIN-A) 0.05 % cream Apply a pea-sized amount to entire face qhs.    valACYclovir (VALTREX) 500 MG tablet TAKE 1 TABLET EVERY DAY AS NEEDED    zolpidem (AMBIEN CR) 6.25 MG CR tablet Take 1 tablet by mouth every evening.     metroNIDAZOLE (METROGEL) 0.75 % (37.5mg/5 gram) vaginal gel Place 1 applicator vaginally 2 (two) times daily.    sumatriptan (IMITREX) 50 MG tablet Once for severe headache. May repeat once after 2 hours. Do not exceed 3-4 doses in one week. NO FURTHER REFILL WITHOUT APT (Patient not taking: Reported on 5/1/2025)     No current facility-administered medications for this visit.       REVIEW OF SYSTEMS:      ROS:  General: -fever, -chills, -fatigue, -weight gain, -weight loss  Eyes: -vision changes, -redness, -discharge  ENT: -ear pain, -nasal congestion, -sore throat  Cardiovascular: -chest pain, -palpitations, -lower extremity edema  Respiratory: -cough, -shortness of breath  Gastrointestinal: -abdominal pain, -nausea, -vomiting, -diarrhea, -constipation, -blood in stool  Genitourinary: -dysuria, -hematuria, -frequency  Musculoskeletal: +joint pain, -muscle pain, +back pain  Skin: -rash, -lesion  Neurological: -headache, -dizziness, -numbness, -tingling  Psychiatric: -anxiety, -depression, -sleep difficulty     OBJECTIVE:    /74 (BP Location: Right arm, Patient Position: Sitting)   Pulse 70   Temp 98.1 °F (36.7 °C) (Oral)   Resp 18   Ht 4' 11" (1.499 m)   Wt 69 kg (152 lb 1.9 oz)   SpO2 100%   BMI 30.72 kg/m²     PHYSICAL EXAMINATION:    General appearance: Well appearing, in no acute distress, alert and oriented x3.  Psych:  Mood and affect appropriate.  Skin: Skin color, texture, turgor normal, no rashes or lesions, in both upper and lower body.  Head/face:  Normocephalic, atraumatic. No palpable lymph nodes.  Neck: No pain to palpation over the cervical paraspinous muscles. Spurling " Negative. No pain with neck flexion, extension, or lateral flexion.   Cor: RRR  Pulm: CTA  GI:  Soft and non-tender.  Back: Straight leg raising in the sitting and supine positions is negative to radicular pain. No pain to palpation over the spine or costovertebral angles. Normal range of motion without pain reproduction.  Extremities: Peripheral joint ROM is full and pain free without obvious instability or laxity in all four extremities. No deformities, edema, or skin discoloration. Good capillary refill.  Musculoskeletal: Shoulder, hip, and knee provocative maneuvers are negative. Tenderness to L SI joint, +bernarda finger, SCOT, compression, and thigh thrust on the left side. Bilateral upper and lower extremity strength is normal and symmetric.  No atrophy or tone abnormalities are noted.  Neuro: Bilateral upper and lower extremity coordination and muscle stretch reflexes are physiologic and symmetric.  Plantar response are downgoing. No loss of sensation is noted.  Gait: normal.    ASSESSMENT: 45 y.o. year old female with lower back pain, consistent with      1. Sacroiliitis        2. Chronic left SI joint pain  Ambulatory referral/consult to Pain Clinic            PLAN:     - I have stressed the importance of physical activity and a home exercise plan to help with pain and improve health.  - Patient can continue with medications for now since they are providing benefits, using them appropriately, and without side effects.  - Patient has failed conservative management and is a candidate for injection.   - Schedule for a Therapeutic SI joint injection on the left to help with his pain and progress with a home exercise program.  - RTC two weeks after procedure with DIANE  - Counseled patient regarding the importance of activity modification and constant sleeping habits.    The above plan and management options were discussed at length with patient. Patient is in agreement with the above and verbalized understanding.  It will be communicated with the referring physician via electronic record, fax, or mail.      Vinny Theodorelatrell  05/01/2025    I spent a total of 30 minutes on the day of the visit.  This includes face to face time and non-face to face time preparing to see the patient by reviewing previous labs/imaging, obtaining and/or reviewing separately obtained history, documenting clinical information in the electronic or other health record, independently interpreting results and communicating results to the patient/family/caregiver. This note was generated with the assistance of ambient listening technology. Verbal consent was obtained by the patient and accompanying visitor(s) for the recording of patient appointment to facilitate this note. I attest to having reviewed and edited the generated note for accuracy, though some syntax or spelling errors may persist. Please contact the author of this note for any clarification.       Marcos Orourke         [1]   Social History  Socioeconomic History    Marital status: Single   Occupational History     Employer: [employer]   Tobacco Use    Smoking status: Never    Smokeless tobacco: Never   Substance and Sexual Activity    Alcohol use: Yes     Comment: occasionally    Drug use: No    Sexual activity: Not Currently     Partners: Male     Social Drivers of Health     Financial Resource Strain: Low Risk  (4/2/2024)    Overall Financial Resource Strain (CARDIA)     Difficulty of Paying Living Expenses: Not very hard   Food Insecurity: No Food Insecurity (4/2/2024)    Hunger Vital Sign     Worried About Running Out of Food in the Last Year: Never true     Ran Out of Food in the Last Year: Never true   Transportation Needs: No Transportation Needs (4/2/2024)    PRAPARE - Transportation     Lack of Transportation (Medical): No     Lack of Transportation (Non-Medical): No   Physical Activity: Insufficiently Active (4/2/2024)    Exercise Vital Sign     Days of Exercise per Week: 3  days     Minutes of Exercise per Session: 20 min   Stress: Stress Concern Present (4/2/2024)    [employer] - Occupational Stress Questionnaire     Feeling of Stress : To some extent   Housing Stability: Low Risk  (4/2/2024)    Housing Stability Vital Sign     Unable to Pay for Housing in the Last Year: No     Number of Places Lived in the Last Year: 1     Unstable Housing in the Last Year: No

## 2025-05-01 NOTE — PROGRESS NOTES
Chronic Pain - New Consult    Referring Physician: Seamus Delgado PA-C    Chief Complaint:   Chief Complaint   Patient presents with    Low-back Pain        SUBJECTIVE:    Chasity Kwan presents to the clinic for the evaluation of left lower back pain. The pain started a month and a half ago following no inciting event and symptoms have been worsening.The pain is located in the left lower back area and does not radiate.  The pain is described as aching and throbbing and is rated as 5/10. The pain is rated with a score of  3/10 on the BEST day and a score of 8/10 on the WORST day.  Symptoms interfere with daily activity, sleeping, and work. The pain is exacerbated by Sitting, Standing, and Walking.  The pain is mitigated by heat, ice, laying down, and medications. She reports spending 5 hours per day reclining. The patient reports 8 hours of uninterrupted sleep per night.    Patient, originally seen in this clinic in 2013 for left SI joint pain, underwent a left SI joint injection which provided 100% relief. She returned in 2023 with shoulder pain, receiving trigger point injections that provided relief. Recently, she was referred back by her orthopedist due to a recurrence of left SI joint pain. The current episode started approximately six weeks ago without any inciting events. She reports left lower back pain that worsens with activity and sitting, but improves when lying on her right side. Pain is relieved somewhat by Aleve and Robaxin. She has been performing a home exercise program focused on yoga and SI joint exercises for a while, with minimal relief. She is interested in repeating the left SI joint injection.    She denies any shooting pain, numbness, tingling, or weakness.    Patient denies night fever/night sweats, urinary incontinence, bowel incontinence, significant weight loss, significant motor weakness, and loss of sensations.    Physical Therapy/Home Exercise: yes - HEP             Pain Disability Index Review:      5/1/2025     8:22 AM 3/9/2023     9:51 AM 7/7/2022     8:21 AM   Last 3 PDI Scores   Pain Disability Index (PDI) 7 24 0       Pain Medications:    Aleve   Robaxin      report:  Reviewed and consistent with medication use as prescribed.    Pain Procedures:   7/9/13 - L SI Joint injection     Imaging:   XR LUMBAR SPINE AP AND LAT WITH FLEX/EXT 4/14/25     CLINICAL HISTORY:  Low back pain, unspecified     TECHNIQUE:  AP and lateral views as well as lateral flexion and extension images are performed through the lumbar spine.     COMPARISON:  Non 09/19/2024 e     FINDINGS:  The disc spaces are well maintained.  No fracture, spondylolisthesis or bone destruction identified     Impression:     See above    MRI LUMBAR SPINE WO CONTRAST 7/2019    CLINICAL HISTORY: M51.26 Other intervertebral disc displacement  lumbar region, PAIN     Technique: Routine unenhanced MRI of the lumbar spine.     Comparison: None.     Findings:     For the purposes of this dictation, the last well-defined interspace is called L5-S1.   There is anatomic alignment of the lumbar spine.    Lumbar vertebrae and intervertebral discs are normal in height.       L1-2: No significant spinal canal stenosis or neural foraminal narrowing.   L2-3: No significant spinal canal stenosis or neural foraminal narrowing.   L3-4: No significant spinal canal stenosis or neural foraminal narrowing.   L4-5: Mild disc bulge with central disc protrusion. No significant canal stenosis. No significant neural foraminal narrowing.   L5-S1: Mild disc bulge. No significant canal stenosis. No significant neural foraminal narrowing.     Normal appearance and position of the terminal spinal cord.       Impression:     L4-L5 central disc protrusion. No significant lumbar spinal stenosis or neural foraminal narrowing.     Past Medical History:   Diagnosis Date    Abnormal Pap smear 2009    colpo    Genital herpes in women     H/O:  myomectomy     EX lap    History of uterine fibroid     Hyperlipidemia     Hypertension     Morbid obesity     Pseudotumor cerebri      Past Surgical History:   Procedure Laterality Date    COLONOSCOPY N/A 12/12/2022    Procedure: COLONOSCOPY;  Surgeon: Michelle Denton MD;  Location: [facility] (4TH FLR);  Service: Endoscopy;  Laterality: N/A;  instr portal-GT  left vm 12/5 mal    ESOPHAGOGASTRODUODENOSCOPY      GASTRECTOMY      gastric sleeve  2015    LAPAROTOMY      SI joint injection       Social History[1]  Family History   Problem Relation Name Age of Onset    Hypertension Mother      Hyperlipidemia Mother      Graves' disease Mother      Obesity Mother      Hyperlipidemia Sister      Obesity Sister      Colon cancer Father  50    Cataracts Father      Cancer Father          colon, liver, and stomach    Heart disease Maternal Grandfather      Stroke Maternal Grandmother      Diabetes Paternal Grandmother      No Known Problems Paternal Grandfather      Breast cancer Neg Hx      Ovarian cancer Neg Hx         Review of patient's allergies indicates:  No Known Allergies    Current Outpatient Medications   Medication Sig    ARIPiprazole (ABILIFY) 2 MG Tab Take 2 mg by mouth.    clindamycin (CLEOCIN T) 1 % lotion Apply to affected areas of face BID prn acne.    clonazePAM (KLONOPIN) 1 MG tablet Take 1 mg by mouth 2 (two) times daily as needed.    dapsone-spironolactone-niacin 8.5-5-2 % Gel Apply to face once daily.    EScitalopram oxalate (LEXAPRO) 20 MG tablet     medroxyPROGESTERone (PROVERA) 10 MG tablet Take 1 tablet (10 mg total) by mouth once daily.    multivitamin (THERAGRAN) per tablet Take 1 tablet by mouth once daily. Patient to stop[ 1 week before surgery starting 3/18/15    omeprazole (PRILOSEC) 40 MG capsule Take 1 capsule (40 mg total) by mouth every morning. Open capsule and take contents by mouth    propranoloL (INDERAL) 10 MG tablet     rosuvastatin (CRESTOR) 40 MG Tab TAKE 1 TABLET BY MOUTH ONCE  " DAILY    tirzepatide 10 mg/0.5 mL PnIj Inject 10 mg into the skin every 7 days.    tretinoin (RETIN-A) 0.05 % cream Apply a pea-sized amount to entire face qhs.    valACYclovir (VALTREX) 500 MG tablet TAKE 1 TABLET EVERY DAY AS NEEDED    zolpidem (AMBIEN CR) 6.25 MG CR tablet Take 1 tablet by mouth every evening.     metroNIDAZOLE (METROGEL) 0.75 % (37.5mg/5 gram) vaginal gel Place 1 applicator vaginally 2 (two) times daily.    sumatriptan (IMITREX) 50 MG tablet Once for severe headache. May repeat once after 2 hours. Do not exceed 3-4 doses in one week. NO FURTHER REFILL WITHOUT APT (Patient not taking: Reported on 5/1/2025)     No current facility-administered medications for this visit.       REVIEW OF SYSTEMS:      ROS:  General: -fever, -chills, -fatigue, -weight gain, -weight loss  Eyes: -vision changes, -redness, -discharge  ENT: -ear pain, -nasal congestion, -sore throat  Cardiovascular: -chest pain, -palpitations, -lower extremity edema  Respiratory: -cough, -shortness of breath  Gastrointestinal: -abdominal pain, -nausea, -vomiting, -diarrhea, -constipation, -blood in stool  Genitourinary: -dysuria, -hematuria, -frequency  Musculoskeletal: +joint pain, -muscle pain, +back pain  Skin: -rash, -lesion  Neurological: -headache, -dizziness, -numbness, -tingling  Psychiatric: -anxiety, -depression, -sleep difficulty     OBJECTIVE:    /74 (BP Location: Right arm, Patient Position: Sitting)   Pulse 70   Temp 98.1 °F (36.7 °C) (Oral)   Resp 18   Ht 4' 11" (1.499 m)   Wt 69 kg (152 lb 1.9 oz)   SpO2 100%   BMI 30.72 kg/m²     PHYSICAL EXAMINATION:    General appearance: Well appearing, in no acute distress, alert and oriented x3.  Psych:  Mood and affect appropriate.  Skin: Skin color, texture, turgor normal, no rashes or lesions, in both upper and lower body.  Head/face:  Normocephalic, atraumatic. No palpable lymph nodes.  Neck: No pain to palpation over the cervical paraspinous muscles. Spurling " Negative. No pain with neck flexion, extension, or lateral flexion.   Cor: RRR  Pulm: CTA  GI:  Soft and non-tender.  Back: Straight leg raising in the sitting and supine positions is negative to radicular pain. No pain to palpation over the spine or costovertebral angles. Normal range of motion without pain reproduction.  Extremities: Peripheral joint ROM is full and pain free without obvious instability or laxity in all four extremities. No deformities, edema, or skin discoloration. Good capillary refill.  Musculoskeletal: Shoulder, hip, and knee provocative maneuvers are negative. Tenderness to L SI joint, +bernarda finger, SCOT, compression, and thigh thrust on the left side. Bilateral upper and lower extremity strength is normal and symmetric.  No atrophy or tone abnormalities are noted.  Neuro: Bilateral upper and lower extremity coordination and muscle stretch reflexes are physiologic and symmetric.  Plantar response are downgoing. No loss of sensation is noted.  Gait: normal.    ASSESSMENT: 45 y.o. year old female with lower back pain, consistent with      1. Sacroiliitis        2. Chronic left SI joint pain  Ambulatory referral/consult to Pain Clinic            PLAN:     - I have stressed the importance of physical activity and a home exercise plan to help with pain and improve health.  - Patient can continue with medications for now since they are providing benefits, using them appropriately, and without side effects.  - Patient has failed conservative management and is a candidate for injection.   - Schedule for a Therapeutic SI joint injection on the left to help with his pain and progress with a home exercise program.  - RTC two weeks after procedure with DIANE  - Counseled patient regarding the importance of activity modification and constant sleeping habits.    The above plan and management options were discussed at length with patient. Patient is in agreement with the above and verbalized understanding.  It will be communicated with the referring physician via electronic record, fax, or mail.      Vinny Theodorelatrell  05/01/2025    I spent a total of 30 minutes on the day of the visit.  This includes face to face time and non-face to face time preparing to see the patient by reviewing previous labs/imaging, obtaining and/or reviewing separately obtained history, documenting clinical information in the electronic or other health record, independently interpreting results and communicating results to the patient/family/caregiver. This note was generated with the assistance of ambient listening technology. Verbal consent was obtained by the patient and accompanying visitor(s) for the recording of patient appointment to facilitate this note. I attest to having reviewed and edited the generated note for accuracy, though some syntax or spelling errors may persist. Please contact the author of this note for any clarification.       Marcos Orourke         [1]   Social History  Socioeconomic History    Marital status: Single   Occupational History     Employer: [employer]   Tobacco Use    Smoking status: Never    Smokeless tobacco: Never   Substance and Sexual Activity    Alcohol use: Yes     Comment: occasionally    Drug use: No    Sexual activity: Not Currently     Partners: Male     Social Drivers of Health     Financial Resource Strain: Low Risk  (4/2/2024)    Overall Financial Resource Strain (CARDIA)     Difficulty of Paying Living Expenses: Not very hard   Food Insecurity: No Food Insecurity (4/2/2024)    Hunger Vital Sign     Worried About Running Out of Food in the Last Year: Never true     Ran Out of Food in the Last Year: Never true   Transportation Needs: No Transportation Needs (4/2/2024)    PRAPARE - Transportation     Lack of Transportation (Medical): No     Lack of Transportation (Non-Medical): No   Physical Activity: Insufficiently Active (4/2/2024)    Exercise Vital Sign     Days of Exercise per Week: 3  days     Minutes of Exercise per Session: 20 min   Stress: Stress Concern Present (4/2/2024)    [employer] [place] [employer] - Occupational Stress Questionnaire     Feeling of Stress : To some extent   Housing Stability: Low Risk  (4/2/2024)    Housing Stability Vital Sign     Unable to Pay for Housing in the Last Year: No     Number of Places Lived in the Last Year: 1     Unstable Housing in the Last Year: No

## 2025-05-06 ENCOUNTER — PATIENT MESSAGE (OUTPATIENT)
Dept: INTERNAL MEDICINE | Facility: CLINIC | Age: 45
End: 2025-05-06
Payer: COMMERCIAL

## 2025-05-13 ENCOUNTER — OFFICE VISIT (OUTPATIENT)
Dept: INTERNAL MEDICINE | Facility: CLINIC | Age: 45
End: 2025-05-13
Payer: COMMERCIAL

## 2025-05-13 DIAGNOSIS — N92.0 MENORRHAGIA WITH REGULAR CYCLE: ICD-10-CM

## 2025-05-13 DIAGNOSIS — D50.0 IRON DEFICIENCY ANEMIA DUE TO CHRONIC BLOOD LOSS: Primary | ICD-10-CM

## 2025-05-13 DIAGNOSIS — R53.83 FATIGUE, UNSPECIFIED TYPE: ICD-10-CM

## 2025-05-13 DIAGNOSIS — Z90.3 HISTORY OF LAPAROSCOPIC PARTIAL GASTRECTOMY: ICD-10-CM

## 2025-05-13 PROCEDURE — 4010F ACE/ARB THERAPY RXD/TAKEN: CPT | Mod: CPTII,95,, | Performed by: STUDENT IN AN ORGANIZED HEALTH CARE EDUCATION/TRAINING PROGRAM

## 2025-05-13 PROCEDURE — 98006 SYNCH AUDIO-VIDEO EST MOD 30: CPT | Mod: 95,,, | Performed by: STUDENT IN AN ORGANIZED HEALTH CARE EDUCATION/TRAINING PROGRAM

## 2025-05-13 PROCEDURE — 3044F HG A1C LEVEL LT 7.0%: CPT | Mod: CPTII,95,, | Performed by: STUDENT IN AN ORGANIZED HEALTH CARE EDUCATION/TRAINING PROGRAM

## 2025-05-13 PROCEDURE — 1160F RVW MEDS BY RX/DR IN RCRD: CPT | Mod: CPTII,95,, | Performed by: STUDENT IN AN ORGANIZED HEALTH CARE EDUCATION/TRAINING PROGRAM

## 2025-05-13 PROCEDURE — 1159F MED LIST DOCD IN RCRD: CPT | Mod: CPTII,95,, | Performed by: STUDENT IN AN ORGANIZED HEALTH CARE EDUCATION/TRAINING PROGRAM

## 2025-05-13 NOTE — PROGRESS NOTES
[facility]      The patient location is: [place]  Visit type: Virtual visit with synchronous audio and video  Each patient to whom he or she provides medical services by telemedicine is:  (1) informed of the relationship between the physician and patient and the respective role of any other health care provider with respect to management of the patient; and (2) notified that he or she may decline to receive medical services by telemedicine and may withdraw from such care at any time.      CHIEF COMPLAINT:   Chief Complaint   Patient presents with    Anemia       HISTORY OF PRESENT ILLNESS: Chasity Kwan is a 45 y.o. female who presents here today for follow up of iron deficiency anemia. Labs 4/2/25 with hgb 11.7 and iron % 17. She doubled her iron supplement as a result. She has heavy menstruation and is planning for hysterectomy next month. Despite trial of progesterone tx prescribed by Gyn she continues to have heavy menstrual bleeding and is concerned that worsening anemia could delay surgery. She also has history of gastrectomy which could influence iron absorption. She has been feeling fatigued as well.       REVIEW OF SYSTEMS:    Answers submitted by the patient for this visit:  Review of Systems Questionnaire (Submitted on 5/10/2025)  activity change: No  unexpected weight change: No  rhinorrhea: No  trouble swallowing: No  visual disturbance: No  chest tightness: No  polyuria: No  difficulty urinating: No  menstrual problem: Yes  joint swelling: No  arthralgias: No  confusion: No  dysphoric mood: No    Review of Systems   HENT:  Negative for hearing loss.    Eyes:  Negative for discharge.   Respiratory:  Negative for wheezing.    Cardiovascular:  Negative for chest pain and palpitations.   Gastrointestinal:  Negative for blood in stool, constipation, diarrhea and vomiting.   Genitourinary:  Negative for dysuria and hematuria.   Musculoskeletal:  Negative for neck pain.    Neurological:  Negative for weakness and headaches.   Endo/Heme/Allergies:  Negative for polydipsia.         MEDICAL HISTORY:    Past Medical History:   Diagnosis Date    Abnormal Pap smear 2009    colpo    Genital herpes in women     H/O: myomectomy     EX lap    History of uterine fibroid     Hyperlipidemia     Hypertension     Morbid obesity     Pseudotumor cerebri        MEDICATIONS:    Medications Ordered Prior to Encounter[1]      PHYSICAL EXAM:    Vital not obtained as this is a virtual visit.     Physical Exam  Constitutional:       General: She is awake. She is not in acute distress.     Appearance: Normal appearance. She is not ill-appearing or toxic-appearing.   HENT:      Right Ear: Hearing normal.      Left Ear: Hearing normal.   Eyes:      Extraocular Movements: Extraocular movements intact.   Pulmonary:      Effort: Pulmonary effort is normal. No respiratory distress.   Musculoskeletal:         General: Normal range of motion.   Skin:     Coloration: Skin is not ashen, jaundiced or pale.   Neurological:      General: No focal deficit present.      Mental Status: She is alert.      Motor: Motor function is intact.   Psychiatric:         Attention and Perception: Attention normal.         Mood and Affect: Mood normal.         Speech: Speech normal.         Behavior: Behavior normal. Behavior is cooperative.         Thought Content: Thought content normal.         Judgment: Judgment normal.             LABS:    Results for orders placed or performed in visit on 04/02/25   Comprehensive Metabolic Panel    Collection Time: 04/02/25  7:40 AM   Result Value Ref Range    Sodium 143 136 - 145 mmol/L    Potassium 4.0 3.5 - 5.1 mmol/L    Chloride 110 95 - 110 mmol/L    CO2 25 23 - 29 mmol/L    Glucose 93 70 - 110 mg/dL    BUN 12 6 - 20 mg/dL    Creatinine 0.7 0.5 - 1.4 mg/dL    Calcium 9.0 8.7 - 10.5 mg/dL    Protein Total 7.1 6.0 - 8.4 gm/dL    Albumin 3.7 3.5 - 5.2 g/dL    Bilirubin Total 0.3 0.1 - 1.0 mg/dL     ALP 48 40 - 150 unit/L    AST 33 11 - 45 unit/L    ALT 37 10 - 44 unit/L    Anion Gap 8 8 - 16 mmol/L    eGFR >60 >60 mL/min/1.73/m2   Hemoglobin A1C    Collection Time: 04/02/25  7:40 AM   Result Value Ref Range    Hemoglobin A1c 4.9 4.0 - 5.6 %    Estimated Average Glucose 94 68 - 131 mg/dL   Lipid Panel    Collection Time: 04/02/25  7:40 AM   Result Value Ref Range    Cholesterol Total 181 120 - 199 mg/dL    Triglyceride 62 30 - 150 mg/dL    HDL Cholesterol 62 40 - 75 mg/dL    LDL Cholesterol 106.6 63.0 - 159.0 mg/dL    HDL/Cholesterol Ratio 34.3 20.0 - 50.0 %    Cholesterol/HDL Ratio 2.9 2.0 - 5.0    Non HDL Cholesterol 119 mg/dL   Vitamin B12    Collection Time: 04/02/25  7:40 AM   Result Value Ref Range    Vitamin B12 >2,000 (H) 210 - 950 pg/mL   Vitamin B6    Collection Time: 04/02/25  7:40 AM   Result Value Ref Range    Vitamin B6 22 5 - 50 ug/L   Vitamin D    Collection Time: 04/02/25  7:40 AM   Result Value Ref Range    Vitamin D 51 30 - 96 ng/mL   Vitamin B1    Collection Time: 04/02/25  7:40 AM   Result Value Ref Range    Vitamin B1 45 38 - 122 ug/L   Ferritin    Collection Time: 04/02/25  7:40 AM   Result Value Ref Range    Ferritin 27.0 20.0 - 300.0 ng/mL   Iron and TIBC    Collection Time: 04/02/25  7:40 AM   Result Value Ref Range    Iron Level 54 30 - 160 ug/dL    Transferrin 218 200 - 375 mg/dL    Iron Binding Capacity Total 323 250 - 450 ug/dL    Iron Saturation 17 (L) 20 - 50 %   CBC with Differential    Collection Time: 04/02/25  7:40 AM   Result Value Ref Range    WBC 5.08 3.90 - 12.70 K/uL    RBC 3.92 (L) 4.00 - 5.40 M/uL    HGB 11.7 (L) 12.0 - 16.0 gm/dL    HCT 35.3 (L) 37.0 - 48.5 %    MCV 90 82 - 98 fL    MCH 29.8 27.0 - 31.0 pg    MCHC 33.1 32.0 - 36.0 g/dL    RDW 14.5 11.5 - 14.5 %    Platelet Count 268 150 - 450 K/uL    MPV 11.8 9.2 - 12.9 fL    Nucleated RBC 0 <=0 /100 WBC    Neut % 61.7 38 - 73 %    Lymph % 27.2 18 - 48 %    Mono % 8.7 4 - 15 %    Eos % 1.6 <=8 %    Basophil % 0.6  "<=1.9 %    Imm Grans % 0.2 0.0 - 0.5 %    Neut # 3.14 1.8 - 7.7 K/uL    Lymph # 1.38 1 - 4.8 K/uL    Mono # 0.44 0.3 - 1 K/uL    Eos # 0.08 <=0.5 K/uL    Baso # 0.03 <=0.2 K/uL    Imm Grans # 0.01 0.00 - 0.04 K/uL               ASSESSMENT & PLAN:    Chasity Olivo" was seen today for anemia.    Diagnoses and all orders for this visit:    Iron deficiency anemia due to chronic blood loss  Labs 4/2/25 with hgb 11.7 and iron % 17.  Likely due to menorrhagia with potential impact from gastrectomy - see below.   Continues to have menorrhagia despite provera trial.   Discussed only 6 weeks may not be enough time for iron stores to build up or Hgb to increase however patient concerned worsening labs could delay hysterectomy planned for next month.    Check updated CBC & iron levels given upcoming surgery and ongoing menorrhagia.  Continue iron supplementation in the meantime.   -     CBC Auto Differential; Future  -     Iron and TIBC; Future  -     Ferritin; Future    Menorrhagia with regular cycle  Likely etiology for SUMMER  Persistent despite trial of provera prescribed by Gyn  Hysterectomy planned next month     History of laparoscopic partial gastrectomy  Could influence absorption of oral iron - check labs as detailed     Fatigue, unspecified type  Possibly due to iron deficiency anemia, will r/o thyroid disease as well  -     TSH; Future              Maris Bacon MD  [facility]         [1]   Current Outpatient Medications on File Prior to Visit   Medication Sig Dispense Refill    ARIPiprazole (ABILIFY) 2 MG Tab Take 2 mg by mouth.      clindamycin (CLEOCIN T) 1 % lotion Apply to affected areas of face BID prn acne. 60 mL 3    clonazePAM (KLONOPIN) 1 MG tablet Take 1 mg by mouth 2 (two) times daily as needed.      dapsone-spironolactone-niacin 8.5-5-2 % Gel Apply to face once daily. 30 g 5    EScitalopram oxalate (LEXAPRO) 20 MG tablet       medroxyPROGESTERone (PROVERA) 10 MG tablet Take 1 tablet (10 mg " total) by mouth once daily. 30 tablet 6    metroNIDAZOLE (METROGEL) 0.75 % (37.5mg/5 gram) vaginal gel Place 1 applicator vaginally 2 (two) times daily. 70 g 0    multivitamin (THERAGRAN) per tablet Take 1 tablet by mouth once daily. Patient to stop[ 1 week before surgery starting 3/18/15      omeprazole (PRILOSEC) 40 MG capsule Take 1 capsule (40 mg total) by mouth every morning. Open capsule and take contents by mouth 30 capsule 11    propranoloL (INDERAL) 10 MG tablet       rosuvastatin (CRESTOR) 40 MG Tab TAKE 1 TABLET BY MOUTH ONCE  DAILY 90 tablet 3    sumatriptan (IMITREX) 50 MG tablet Once for severe headache. May repeat once after 2 hours. Do not exceed 3-4 doses in one week. NO FURTHER REFILL WITHOUT APT (Patient not taking: Reported on 5/1/2025) 12 tablet 0    tirzepatide 10 mg/0.5 mL PnIj Inject 10 mg into the skin every 7 days.      tretinoin (RETIN-A) 0.05 % cream Apply a pea-sized amount to entire face qhs. 20 g 5    valACYclovir (VALTREX) 500 MG tablet TAKE 1 TABLET EVERY DAY AS NEEDED 90 tablet 1    zolpidem (AMBIEN CR) 6.25 MG CR tablet Take 1 tablet by mouth every evening.   2     No current facility-administered medications on file prior to visit.

## 2025-05-14 ENCOUNTER — HOSPITAL ENCOUNTER (OUTPATIENT)
Facility: OTHER | Age: 45
Discharge: HOME | End: 2025-05-14
Attending: ANESTHESIOLOGY | Admitting: ANESTHESIOLOGY
Payer: COMMERCIAL

## 2025-05-14 VITALS
TEMPERATURE: 99 F | RESPIRATION RATE: 18 BRPM | HEART RATE: 67 BPM | OXYGEN SATURATION: 98 % | HEIGHT: 59 IN | WEIGHT: 152 LBS | SYSTOLIC BLOOD PRESSURE: 111 MMHG | BODY MASS INDEX: 30.64 KG/M2 | DIASTOLIC BLOOD PRESSURE: 56 MMHG

## 2025-05-14 DIAGNOSIS — G89.29 CHRONIC PAIN: ICD-10-CM

## 2025-05-14 DIAGNOSIS — M53.3 SACROILIAC PAIN: Primary | ICD-10-CM

## 2025-05-14 PROCEDURE — 63600175 PHARM REV CODE 636 W HCPCS: Performed by: ANESTHESIOLOGY

## 2025-05-14 PROCEDURE — 25500020 PHARM REV CODE 255: Performed by: ANESTHESIOLOGY

## 2025-05-14 PROCEDURE — 27096 INJECT SACROILIAC JOINT: CPT | Mod: RT | Performed by: ANESTHESIOLOGY

## 2025-05-14 PROCEDURE — 27096 INJECT SACROILIAC JOINT: CPT | Mod: RT,,, | Performed by: ANESTHESIOLOGY

## 2025-05-14 RX ORDER — SODIUM CHLORIDE 9 MG/ML
INJECTION, SOLUTION INTRAVENOUS CONTINUOUS
Status: DISCONTINUED | OUTPATIENT
Start: 2025-05-14 | End: 2025-05-14 | Stop reason: HOSPADM

## 2025-05-14 RX ORDER — LIDOCAINE HYDROCHLORIDE 20 MG/ML
INJECTION, SOLUTION INFILTRATION; PERINEURAL
Status: DISCONTINUED | OUTPATIENT
Start: 2025-05-14 | End: 2025-05-14 | Stop reason: HOSPADM

## 2025-05-14 RX ORDER — FENTANYL CITRATE 50 UG/ML
INJECTION, SOLUTION INTRAMUSCULAR; INTRAVENOUS
Status: DISCONTINUED | OUTPATIENT
Start: 2025-05-14 | End: 2025-05-14 | Stop reason: HOSPADM

## 2025-05-14 RX ORDER — BUPIVACAINE HYDROCHLORIDE 2.5 MG/ML
INJECTION, SOLUTION EPIDURAL; INFILTRATION; INTRACAUDAL; PERINEURAL
Status: DISCONTINUED | OUTPATIENT
Start: 2025-05-14 | End: 2025-05-14 | Stop reason: HOSPADM

## 2025-05-14 RX ORDER — TRIAMCINOLONE ACETONIDE 40 MG/ML
INJECTION, SUSPENSION INTRA-ARTICULAR; INTRAMUSCULAR
Status: DISCONTINUED | OUTPATIENT
Start: 2025-05-14 | End: 2025-05-14 | Stop reason: HOSPADM

## 2025-05-14 RX ORDER — MIDAZOLAM HYDROCHLORIDE 1 MG/ML
INJECTION INTRAMUSCULAR; INTRAVENOUS
Status: DISCONTINUED | OUTPATIENT
Start: 2025-05-14 | End: 2025-05-14 | Stop reason: HOSPADM

## 2025-05-14 NOTE — OR NURSING
Pt stated her ride was unavailable due to a meeting. Pt wants to take an uber home. It was explained she would need clearance from the MD. MD cleared her to go home via [employer].

## 2025-05-14 NOTE — DISCHARGE INSTRUCTIONS

## 2025-05-14 NOTE — OP NOTE
Sacroiliac Joint Injection under Fluoroscopic Guidance    The procedure, risks, benefits, and options were discussed with the patient. There are no contraindications to the procedure. The patent expressed understanding and agreed to the procedure. Informed written consent was obtained prior to the start of the procedure and can be found in the patient's chart.    PATIENT NAME: Chasity Kwan   MRN: [id number]     DATE OF PROCEDURE: 05/14/2025    PROCEDURE: Right Sacroiliac Joint Injection under Fluoroscopic Guidance    PRE-OP DIAGNOSIS: Sacroiliitis [M46.1]    POST-OP DIAGNOSIS: Same    PHYSICIAN: Marcos Orourke MD    ASSISTANTS: Kevin Pandya MD  [facility]      MEDICATIONS INJECTED: Preservative-free Kenalog 40mg with 3cc of Bupivacine 0.25%     LOCAL ANESTHETIC INJECTED: Xylocaine 2%     SEDATION: Versed 2mg and Fentanyl 50mcg                                                                                                                                                                                     Conscious sedation ordered by M.D. Patient re-evaluation prior to administration of conscious sedation. No changes noted in patient's status from initial evaluation. The patient's vital signs were monitored by RN and patient remained hemodynamically stable throughout the procedure.    Event Time In   Sedation Start 1113   Sedation End 1119       ESTIMATED BLOOD LOSS: None    COMPLICATIONS: None    TECHNIQUE: Time-out was performed to identify the patient and procedure to be performed. With the patient laying in a prone position, the surgical area was prepped and draped in the usual sterile fashion using ChloraPrep and a fenestrated drape. The sacroiliac joint was determined under fluoroscopy guidance. Skin anesthesia was achieved by injecting Lidocaine 2% over the injection site. The sacroiliac joint was  then approached with a 25 gauge, 3.5 inch spinal quinke needle that was introduced under  fluoroscopic guidance in the AP and Lateral views. Once the needle tip was in the area of the joint, and there was no blood, contrast dye Omnipaque (300mg/mL) was injected to confirm placement and there was no vascular runoff. Fluoroscopic imaging in the AP and lateral views revealed a clear outline of the joint space. 4 mL of the medication mixture listed above was injected slowly intraarticular and tien-articular. Displacement of the radio opaque contrast after injection of the medication confirmed that the medication went into the area of the joint. The needles were removed and bleeding was nil.  A sterile dressing was applied. No specimens collected. The patient tolerated the procedure well.       The patient was monitored after the procedure in the recovery area. They were given post-procedure and discharge instructions to follow at home. The patient was discharged in a stable condition.    Kevin Pandya MD     I reviewed and edited the fellow's note. I conducted my own interview and physical examination. I agree with the findings. I was present and supervising all critical portions of the procedure.    Marcos Orourke MD

## 2025-05-14 NOTE — DISCHARGE SUMMARY
Discharge Note  Short Stay      SUMMARY     Admit Date: 5/14/2025    Attending Physician: Marcos Orourke MD    Discharge Physician: Marcos Orourke MD      Discharge Date: 5/14/2025 11:18 AM    Procedure(s) (LRB):  INJECTION,SACROILIAC JOINT LEFT (Left)    Final Diagnosis: Sacroiliitis [M46.1]    Disposition: Home or self care    Patient Instructions:   Current Discharge Medication List        CONTINUE these medications which have NOT CHANGED    Details   ARIPiprazole (ABILIFY) 2 MG Tab Take 2 mg by mouth.      clindamycin (CLEOCIN T) 1 % lotion Apply to affected areas of face BID prn acne.  Qty: 60 mL, Refills: 3    Associated Diagnoses: Acne vulgaris      clonazePAM (KLONOPIN) 1 MG tablet Take 1 mg by mouth 2 (two) times daily as needed.      dapsone-spironolactone-niacin 8.5-5-2 % Gel Apply to face once daily.  Qty: 30 g, Refills: 5    Associated Diagnoses: Acne vulgaris      EScitalopram oxalate (LEXAPRO) 20 MG tablet       medroxyPROGESTERone (PROVERA) 10 MG tablet Take 1 tablet (10 mg total) by mouth once daily.  Qty: 30 tablet, Refills: 6      metroNIDAZOLE (METROGEL) 0.75 % (37.5mg/5 gram) vaginal gel Place 1 applicator vaginally 2 (two) times daily.  Qty: 70 g, Refills: 0      multivitamin (THERAGRAN) per tablet Take 1 tablet by mouth once daily. Patient to stop[ 1 week before surgery starting 3/18/15      omeprazole (PRILOSEC) 40 MG capsule Take 1 capsule (40 mg total) by mouth every morning. Open capsule and take contents by mouth  Qty: 30 capsule, Refills: 11    Associated Diagnoses: GERD (gastroesophageal reflux disease)      propranoloL (INDERAL) 10 MG tablet       rosuvastatin (CRESTOR) 40 MG Tab TAKE 1 TABLET BY MOUTH ONCE  DAILY  Qty: 90 tablet, Refills: 3    Comments: Please send a replace/new response with 90-Day Supply if appropriate to maximize member benefit. Requesting 1 year supply.  Associated Diagnoses: Hyperlipidemia, unspecified hyperlipidemia type      sumatriptan (IMITREX) 50 MG  tablet Once for severe headache. May repeat once after 2 hours. Do not exceed 3-4 doses in one week. NO FURTHER REFILL WITHOUT APT  Qty: 12 tablet, Refills: 0    Associated Diagnoses: Other migraine without status migrainosus, not intractable      tirzepatide 10 mg/0.5 mL PnIj Inject 10 mg into the skin every 7 days.      tretinoin (RETIN-A) 0.05 % cream Apply a pea-sized amount to entire face qhs.  Qty: 20 g, Refills: 5    Associated Diagnoses: Acne vulgaris      valACYclovir (VALTREX) 500 MG tablet TAKE 1 TABLET EVERY DAY AS NEEDED  Qty: 90 tablet, Refills: 1    Associated Diagnoses: Herpes      zolpidem (AMBIEN CR) 6.25 MG CR tablet Take 1 tablet by mouth every evening.   Refills: 2                 Discharge Diagnosis: Sacroiliitis [M46.1]  Condition on Discharge: Stable with no complications to procedure   Diet on Discharge: Same as before.  Activity: as per instruction sheet.  Discharge to: Home with a responsible adult.  Follow up: 2-4 weeks       Please call my office or pager at [phone] if experienced any weakness or loss of sensation, fever > 101.5, pain uncontrolled with oral medications, persistent nausea/vomiting/or diarrhea, redness or drainage from the incisions, or any other worrisome concerns. If physician on call was not reached or could not communicate with our office for any reason please go to the nearest emergency department     Kevin Pandya MD

## 2025-05-15 ENCOUNTER — LAB VISIT (OUTPATIENT)
Dept: LAB | Facility: OTHER | Age: 45
End: 2025-05-15
Attending: STUDENT IN AN ORGANIZED HEALTH CARE EDUCATION/TRAINING PROGRAM
Payer: COMMERCIAL

## 2025-05-15 ENCOUNTER — RESULTS FOLLOW-UP (OUTPATIENT)
Dept: INTERNAL MEDICINE | Facility: CLINIC | Age: 45
End: 2025-05-15

## 2025-05-15 DIAGNOSIS — R53.83 FATIGUE, UNSPECIFIED TYPE: ICD-10-CM

## 2025-05-15 DIAGNOSIS — D50.0 IRON DEFICIENCY ANEMIA DUE TO CHRONIC BLOOD LOSS: ICD-10-CM

## 2025-05-15 LAB
ABSOLUTE EOSINOPHIL (OHS): 0.02 K/UL
ABSOLUTE MONOCYTE (OHS): 0.55 K/UL (ref 0.3–1)
ABSOLUTE NEUTROPHIL COUNT (OHS): 5.5 K/UL (ref 1.8–7.7)
BASOPHILS # BLD AUTO: 0.02 K/UL
BASOPHILS NFR BLD AUTO: 0.3 %
ERYTHROCYTE [DISTWIDTH] IN BLOOD BY AUTOMATED COUNT: 14 % (ref 11.5–14.5)
FERRITIN SERPL-MCNC: 29 NG/ML (ref 20–300)
HCT VFR BLD AUTO: 35.7 % (ref 37–48.5)
HGB BLD-MCNC: 11.8 GM/DL (ref 12–16)
IMM GRANULOCYTES # BLD AUTO: 0.02 K/UL (ref 0–0.04)
IMM GRANULOCYTES NFR BLD AUTO: 0.3 % (ref 0–0.5)
IRON SATN MFR SERPL: 23 % (ref 20–50)
IRON SERPL-MCNC: 75 UG/DL (ref 30–160)
LYMPHOCYTES # BLD AUTO: 1.39 K/UL (ref 1–4.8)
MCH RBC QN AUTO: 29.9 PG (ref 27–31)
MCHC RBC AUTO-ENTMCNC: 33.1 G/DL (ref 32–36)
MCV RBC AUTO: 91 FL (ref 82–98)
NUCLEATED RBC (/100WBC) (OHS): 0 /100 WBC
PLATELET # BLD AUTO: 277 K/UL (ref 150–450)
PMV BLD AUTO: 11.6 FL (ref 9.2–12.9)
RBC # BLD AUTO: 3.94 M/UL (ref 4–5.4)
RELATIVE EOSINOPHIL (OHS): 0.3 %
RELATIVE LYMPHOCYTE (OHS): 18.5 % (ref 18–48)
RELATIVE MONOCYTE (OHS): 7.3 % (ref 4–15)
RELATIVE NEUTROPHIL (OHS): 73.3 % (ref 38–73)
TIBC SERPL-MCNC: 326 UG/DL (ref 250–450)
TRANSFERRIN SERPL-MCNC: 220 MG/DL (ref 200–375)
TSH SERPL-ACNC: 0.8 UIU/ML (ref 0.4–4)
WBC # BLD AUTO: 7.5 K/UL (ref 3.9–12.7)

## 2025-05-15 PROCEDURE — 83540 ASSAY OF IRON: CPT

## 2025-05-15 PROCEDURE — 82728 ASSAY OF FERRITIN: CPT

## 2025-05-15 PROCEDURE — 85025 COMPLETE CBC W/AUTO DIFF WBC: CPT

## 2025-05-15 PROCEDURE — 84443 ASSAY THYROID STIM HORMONE: CPT

## 2025-05-15 PROCEDURE — 36415 COLL VENOUS BLD VENIPUNCTURE: CPT

## 2025-05-29 ENCOUNTER — PATIENT MESSAGE (OUTPATIENT)
Dept: PAIN MEDICINE | Facility: CLINIC | Age: 45
End: 2025-05-29
Payer: COMMERCIAL

## 2025-06-05 ENCOUNTER — ANESTHESIA EVENT (OUTPATIENT)
Dept: SURGERY | Facility: OTHER | Age: 45
End: 2025-06-05
Payer: COMMERCIAL

## 2025-06-05 NOTE — ANESTHESIA PREPROCEDURE EVALUATION
06/05/2025  Chasity Kwan is a 45 y.o., female.      Pre-op Assessment    I have reviewed the Patient Summary Reports.     I have reviewed the Nursing Notes. I have reviewed the NPO Status.   I have reviewed the Medications.     Review of Systems  Anesthesia Hx:             Denies Family Hx of Anesthesia complications.    Denies Personal Hx of Anesthesia complications.                    Social:  Non-Smoker       Hematology/Oncology:  Hematology Normal   Oncology Normal                Hematology Comments: Vitamin B12 deficiency                      EENT/Dental:  EENT/Dental Normal           Cardiovascular:     Hypertension (no longer on BP medication)           hyperlipidemia                               Pulmonary:        Sleep Apnea                Renal/:  Renal/ Normal                 Hepatic/GI:     GERD, well controlled   H/o partial gastrectomy Taking GLP-1 Agonists Instructed to Hold for 7 Days           Musculoskeletal:  Musculoskeletal Normal                Neurological:      Headaches                                 Endocrine:  Endocrine Normal            Dermatological:  Skin Normal    Psych:   anxiety                 Physical Exam  General: Well nourished, Cooperative, Alert and Oriented    Airway:  Mallampati: III   Mouth Opening: Normal  TM Distance: Normal  Tongue: Normal  Neck ROM: Normal ROM    Dental:  Intact        Anesthesia Plan  Type of Anesthesia, risks & benefits discussed:    Anesthesia Type: Gen ETT  Intra-op Monitoring Plan: Standard ASA Monitors  Post Op Pain Control Plan: multimodal analgesia  Induction:  IV  Airway Plan: Video, Post-Induction  ASA Score: 3  Anesthesia Plan Notes: T&S  CBC 05/2025 and BMP 04/2025 in EPIC  Last GLP1 dose: 6/3/25    Ready For Surgery From Anesthesia Perspective.     .

## 2025-06-06 ENCOUNTER — OFFICE VISIT (OUTPATIENT)
Dept: PAIN MEDICINE | Facility: CLINIC | Age: 45
End: 2025-06-06
Payer: COMMERCIAL

## 2025-06-06 VITALS
WEIGHT: 151.25 LBS | OXYGEN SATURATION: 98 % | HEART RATE: 78 BPM | TEMPERATURE: 99 F | DIASTOLIC BLOOD PRESSURE: 72 MMHG | BODY MASS INDEX: 30.55 KG/M2 | SYSTOLIC BLOOD PRESSURE: 110 MMHG

## 2025-06-06 DIAGNOSIS — M53.3 SACROILIAC JOINT PAIN: Primary | ICD-10-CM

## 2025-06-06 DIAGNOSIS — M79.18 MYOFASCIAL PAIN: ICD-10-CM

## 2025-06-06 DIAGNOSIS — M53.3 SACROILIAC PAIN: Primary | ICD-10-CM

## 2025-06-06 DIAGNOSIS — M47.816 LUMBAR SPONDYLOSIS: ICD-10-CM

## 2025-06-06 PROCEDURE — 99999 PR PBB SHADOW E&M-EST. PATIENT-LVL IV: CPT | Mod: PBBFAC,,, | Performed by: NURSE PRACTITIONER

## 2025-06-06 RX ORDER — GABAPENTIN 300 MG/1
300 CAPSULE ORAL NIGHTLY
Qty: 30 CAPSULE | Refills: 1 | Status: SHIPPED | OUTPATIENT
Start: 2025-06-06 | End: 2026-06-06

## 2025-06-09 ENCOUNTER — HOSPITAL ENCOUNTER (OUTPATIENT)
Dept: PREADMISSION TESTING | Facility: OTHER | Age: 45
Discharge: HOME | End: 2025-06-09
Attending: OBSTETRICS & GYNECOLOGY
Payer: COMMERCIAL

## 2025-06-09 ENCOUNTER — OFFICE VISIT (OUTPATIENT)
Dept: OBSTETRICS AND GYNECOLOGY | Facility: CLINIC | Age: 45
End: 2025-06-09
Payer: COMMERCIAL

## 2025-06-09 ENCOUNTER — PATIENT MESSAGE (OUTPATIENT)
Dept: PAIN MEDICINE | Facility: CLINIC | Age: 45
End: 2025-06-09
Payer: COMMERCIAL

## 2025-06-09 VITALS
HEART RATE: 71 BPM | HEIGHT: 59 IN | RESPIRATION RATE: 16 BRPM | DIASTOLIC BLOOD PRESSURE: 57 MMHG | TEMPERATURE: 98 F | BODY MASS INDEX: 30.24 KG/M2 | OXYGEN SATURATION: 98 % | WEIGHT: 150 LBS | SYSTOLIC BLOOD PRESSURE: 98 MMHG

## 2025-06-09 VITALS
SYSTOLIC BLOOD PRESSURE: 118 MMHG | WEIGHT: 151.88 LBS | BODY MASS INDEX: 30.62 KG/M2 | DIASTOLIC BLOOD PRESSURE: 70 MMHG | HEIGHT: 59 IN

## 2025-06-09 DIAGNOSIS — Z01.419 ENCOUNTER FOR GYNECOLOGICAL EXAMINATION WITHOUT ABNORMAL FINDING: Primary | ICD-10-CM

## 2025-06-09 DIAGNOSIS — Z01.818 PREOP TESTING: Primary | ICD-10-CM

## 2025-06-09 DIAGNOSIS — D13.30: ICD-10-CM

## 2025-06-09 DIAGNOSIS — D21.9 FIBROIDS: ICD-10-CM

## 2025-06-09 LAB
INDIRECT COOMBS: NORMAL
RH BLD: NORMAL
SPECIMEN OUTDATE: NORMAL

## 2025-06-09 PROCEDURE — 3044F HG A1C LEVEL LT 7.0%: CPT | Mod: CPTII,S$GLB,, | Performed by: OBSTETRICS & GYNECOLOGY

## 2025-06-09 PROCEDURE — 3074F SYST BP LT 130 MM HG: CPT | Mod: CPTII,S$GLB,, | Performed by: OBSTETRICS & GYNECOLOGY

## 2025-06-09 PROCEDURE — 1160F RVW MEDS BY RX/DR IN RCRD: CPT | Mod: CPTII,S$GLB,, | Performed by: OBSTETRICS & GYNECOLOGY

## 2025-06-09 PROCEDURE — 99396 PREV VISIT EST AGE 40-64: CPT | Mod: S$GLB,,, | Performed by: OBSTETRICS & GYNECOLOGY

## 2025-06-09 PROCEDURE — 4010F ACE/ARB THERAPY RXD/TAKEN: CPT | Mod: CPTII,S$GLB,, | Performed by: OBSTETRICS & GYNECOLOGY

## 2025-06-09 PROCEDURE — 99999 PR PBB SHADOW E&M-EST. PATIENT-LVL IV: CPT | Mod: PBBFAC,,, | Performed by: OBSTETRICS & GYNECOLOGY

## 2025-06-09 PROCEDURE — 3008F BODY MASS INDEX DOCD: CPT | Mod: CPTII,S$GLB,, | Performed by: OBSTETRICS & GYNECOLOGY

## 2025-06-09 PROCEDURE — 3078F DIAST BP <80 MM HG: CPT | Mod: CPTII,S$GLB,, | Performed by: OBSTETRICS & GYNECOLOGY

## 2025-06-09 PROCEDURE — 86850 RBC ANTIBODY SCREEN: CPT

## 2025-06-09 PROCEDURE — 1159F MED LIST DOCD IN RCRD: CPT | Mod: CPTII,S$GLB,, | Performed by: OBSTETRICS & GYNECOLOGY

## 2025-06-09 RX ORDER — CELECOXIB 200 MG/1
400 CAPSULE ORAL
OUTPATIENT
Start: 2025-06-09

## 2025-06-09 RX ORDER — SODIUM CHLORIDE 0.9 % (FLUSH) 0.9 %
3 SYRINGE (ML) INJECTION
OUTPATIENT
Start: 2025-06-09

## 2025-06-09 RX ORDER — HYDROCODONE BITARTRATE AND ACETAMINOPHEN 5; 325 MG/1; MG/1
1 TABLET ORAL EVERY 4 HOURS PRN
Refills: 0 | OUTPATIENT
Start: 2025-06-09

## 2025-06-09 RX ORDER — ACETAMINOPHEN 325 MG/1
650 TABLET ORAL EVERY 4 HOURS PRN
OUTPATIENT
Start: 2025-06-09

## 2025-06-09 RX ORDER — CEFAZOLIN SODIUM 2 G/50ML
2 SOLUTION INTRAVENOUS
OUTPATIENT
Start: 2025-06-09

## 2025-06-09 RX ORDER — SODIUM CHLORIDE, SODIUM LACTATE, POTASSIUM CHLORIDE, CALCIUM CHLORIDE 600; 310; 30; 20 MG/100ML; MG/100ML; MG/100ML; MG/100ML
INJECTION, SOLUTION INTRAVENOUS CONTINUOUS
OUTPATIENT
Start: 2025-06-09

## 2025-06-09 RX ORDER — HYDROCODONE BITARTRATE AND ACETAMINOPHEN 10; 325 MG/1; MG/1
1 TABLET ORAL EVERY 4 HOURS PRN
Refills: 0 | OUTPATIENT
Start: 2025-06-09

## 2025-06-09 RX ORDER — DIPHENHYDRAMINE HCL 25 MG
25 CAPSULE ORAL EVERY 4 HOURS PRN
OUTPATIENT
Start: 2025-06-09

## 2025-06-09 RX ORDER — LIDOCAINE HYDROCHLORIDE 10 MG/ML
0.5 INJECTION, SOLUTION EPIDURAL; INFILTRATION; INTRACAUDAL; PERINEURAL
OUTPATIENT
Start: 2025-06-09

## 2025-06-09 RX ORDER — DIPHENHYDRAMINE HYDROCHLORIDE 50 MG/ML
25 INJECTION, SOLUTION INTRAMUSCULAR; INTRAVENOUS EVERY 4 HOURS PRN
OUTPATIENT
Start: 2025-06-09

## 2025-06-09 RX ORDER — MEPERIDINE HYDROCHLORIDE 25 MG/ML
12.5 INJECTION INTRAMUSCULAR; INTRAVENOUS; SUBCUTANEOUS ONCE AS NEEDED
Refills: 0 | OUTPATIENT
Start: 2025-06-09 | End: 2025-06-10

## 2025-06-09 RX ORDER — HYDROMORPHONE HYDROCHLORIDE 1 MG/ML
1 INJECTION, SOLUTION INTRAMUSCULAR; INTRAVENOUS; SUBCUTANEOUS EVERY 4 HOURS PRN
Refills: 0 | OUTPATIENT
Start: 2025-06-09

## 2025-06-09 RX ORDER — IBUPROFEN 400 MG/1
800 TABLET, FILM COATED ORAL
OUTPATIENT
Start: 2025-06-10

## 2025-06-09 RX ORDER — AMOXICILLIN 250 MG
1 CAPSULE ORAL 2 TIMES DAILY
OUTPATIENT
Start: 2025-06-09

## 2025-06-09 RX ORDER — HYDROMORPHONE HYDROCHLORIDE 1 MG/ML
0.2 INJECTION, SOLUTION INTRAMUSCULAR; INTRAVENOUS; SUBCUTANEOUS EVERY 5 MIN PRN
Refills: 0 | OUTPATIENT
Start: 2025-06-09

## 2025-06-09 RX ORDER — ACETAMINOPHEN 500 MG
1000 TABLET ORAL
OUTPATIENT
Start: 2025-06-09 | End: 2025-06-09

## 2025-06-09 RX ORDER — MUPIROCIN 20 MG/G
OINTMENT TOPICAL
OUTPATIENT
Start: 2025-06-09

## 2025-06-09 RX ORDER — PREGABALIN 50 MG/1
50 CAPSULE ORAL
OUTPATIENT
Start: 2025-06-09

## 2025-06-09 RX ORDER — PROCHLORPERAZINE EDISYLATE 5 MG/ML
5 INJECTION INTRAMUSCULAR; INTRAVENOUS EVERY 10 MIN PRN
OUTPATIENT
Start: 2025-06-09

## 2025-06-09 RX ORDER — KETOROLAC TROMETHAMINE 30 MG/ML
30 INJECTION, SOLUTION INTRAMUSCULAR; INTRAVENOUS
OUTPATIENT
Start: 2025-06-09 | End: 2025-06-10

## 2025-06-09 RX ORDER — LIDOCAINE HYDROCHLORIDE 10 MG/ML
0.5 INJECTION, SOLUTION EPIDURAL; INFILTRATION; INTRACAUDAL; PERINEURAL ONCE
OUTPATIENT
Start: 2025-06-09 | End: 2025-06-09

## 2025-06-09 RX ORDER — PROCHLORPERAZINE EDISYLATE 5 MG/ML
5 INJECTION INTRAMUSCULAR; INTRAVENOUS EVERY 6 HOURS PRN
OUTPATIENT
Start: 2025-06-09

## 2025-06-09 RX ORDER — ONDANSETRON 8 MG/1
8 TABLET, ORALLY DISINTEGRATING ORAL EVERY 8 HOURS PRN
OUTPATIENT
Start: 2025-06-09

## 2025-06-09 RX ORDER — FAMOTIDINE 20 MG/1
20 TABLET, FILM COATED ORAL
OUTPATIENT
Start: 2025-06-09

## 2025-06-09 RX ORDER — POLYETHYLENE GLYCOL 3350 17 G/17G
17 POWDER, FOR SOLUTION ORAL DAILY
OUTPATIENT
Start: 2025-06-09

## 2025-06-09 RX ORDER — ONDANSETRON HYDROCHLORIDE 2 MG/ML
4 INJECTION, SOLUTION INTRAVENOUS DAILY PRN
OUTPATIENT
Start: 2025-06-09

## 2025-06-09 RX ORDER — ACETAMINOPHEN 500 MG
1000 TABLET ORAL
OUTPATIENT
Start: 2025-06-09

## 2025-06-09 NOTE — H&P (VIEW-ONLY)
[facility]  Obstetrics & Gynecology  History & Physical    Patient Name: Chasity Kwan  MRN: [id number]  Admission Date: (Not on file)  Primary Care Provider: Odilon Alas MD    Subjective:     Chief Complaint/Reason for Admission: PRE OP  FIbroids    History of Present Illness:      Chasity Kwan is a 44 y.o. female  presents for PRE OP for a [facility] BS for  FIBROIDS  Her fibroids are symptomatic and problematic. She is ready to proceed with definitive management       FINDINGS:  Uterus measures 8.3 x 5.2 x 6.5 cm.  Endometrial thickness estimated at 0.4 cm.     I count at least 15 intramural and subserosal leiomyomas.  These are all fairly similar in appearance, of fairly homogeneous T2 signal hypointensity with hypoenhancement following contrast.     -1 of the largest is a broad-based subserosal leiomyoma at the fundus on the left, 3.4 x 2.7 x 2.9 cm.     -anterior uterine body intramural leiomyoma, 2.4 x 2.4 x 2.5 cm.     Right ovary measures 2.6 x 2.3 x 2.2 cm.  Corpus luteum.  No suspicious adnexal mass.     Left ovary measures 2.4 x 1.5 x 1.1 cm.  No suspicious adnexal mass.     Urinary bladder is unremarkable.  Bowel loops appear normal caliber.  No significant free fluid in the pelvis.     Regional skeleton shows degenerative change L4-5.     Impression:     At least 15 intramural and broad-based subserosal leiomyomas measuring up to 2.6 cm.        Current Outpatient Medications on File Prior to Visit   Medication Sig    ARIPiprazole (ABILIFY) 2 MG Tab Take 2 mg by mouth.    clonazePAM (KLONOPIN) 1 MG tablet Take 1 mg by mouth 2 (two) times daily as needed.    ergocalciferol, vitamin D2, (VITAMIN D ORAL) Take by mouth.    EScitalopram oxalate (LEXAPRO) 20 MG tablet     gabapentin (NEURONTIN) 300 MG capsule Take 1 capsule (300 mg total) by mouth every evening.    medroxyPROGESTERone (PROVERA) 10 MG tablet Take 1 tablet (10 mg total) by mouth once daily.    multivitamin  (THERAGRAN) per tablet Take 1 tablet by mouth once daily. Patient to stop[ 1 week before surgery starting 3/18/15    omeprazole (PRILOSEC) 40 MG capsule Take 1 capsule (40 mg total) by mouth every morning. Open capsule and take contents by mouth    rosuvastatin (CRESTOR) 40 MG Tab TAKE 1 TABLET BY MOUTH ONCE  DAILY    sumatriptan (IMITREX) 50 MG tablet Once for severe headache. May repeat once after 2 hours. Do not exceed 3-4 doses in one week. NO FURTHER REFILL WITHOUT APT    tirzepatide 10 mg/0.5 mL PnIj Inject 10 mg into the skin every 7 days.    tirzepatide, weight loss, 15 mg/0.5 mL PnIj Inject 15 mg into the skin.    valACYclovir (VALTREX) 500 MG tablet TAKE 1 TABLET EVERY DAY AS NEEDED    zolpidem (AMBIEN CR) 6.25 MG CR tablet Take 1 tablet by mouth every evening.     [DISCONTINUED] clindamycin (CLEOCIN T) 1 % lotion Apply to affected areas of face BID prn acne.    [DISCONTINUED] tretinoin (RETIN-A) 0.05 % cream Apply a pea-sized amount to entire face qhs.     No current facility-administered medications on file prior to visit.       Review of patient's allergies indicates:  No Known Allergies    Past Medical History:   Diagnosis Date    Abnormal Pap smear     colpo    Genital herpes in women     H/O: myomectomy     EX lap    History of uterine fibroid     Hyperlipidemia     Hypertension     no longer on medication    Morbid obesity     Pseudotumor cerebri     Sleep apnea     cpap uses irregularly    Wears contact lenses     Wears prescription eyeglasses      OB History    Para Term  AB Living   0 0 0 0 0 0   SAB IAB Ectopic Multiple Live Births   0 0 0 0      Past Surgical History:   Procedure Laterality Date    COLONOSCOPY N/A 2022    Procedure: COLONOSCOPY;  Surgeon: Michelle Denton MD;  Location: [facility] ([address] [place] [address]);  Service: Endoscopy;  Laterality: N/A;  instr portal-GT  left vm  mal    ESOPHAGOGASTRODUODENOSCOPY      GASTRECTOMY      gastric sleeve      INJECTION,  SACROILIAC JOINT Left 5/14/2025    Procedure: INJECTION,SACROILIAC JOINT LEFT;  Surgeon: Marcos Orourke MD;  [facility];  Service: Pain Management;  Laterality: Left;  2 WK F/U DIANE    LAPAROTOMY      SI joint injection       Family History       Problem Relation (Age of Onset)    Cancer Father    Cataracts Father    Colon cancer Father (50)    Diabetes Paternal Grandmother    Graves' disease Mother    Heart disease Maternal Grandfather    Hyperlipidemia Mother, Sister    Hypertension Mother    No Known Problems Paternal Grandfather    Obesity Mother, Sister    Stroke Maternal Grandmother          Tobacco Use    Smoking status: Never     Passive exposure: Past    Smokeless tobacco: Never   Vaping Use    Vaping status: Never Used   Substance and Sexual Activity    Alcohol use: Yes     Comment: occasionally    Drug use: No    Sexual activity: Not Currently     Partners: Male     Review of Systems  Objective:     Vital Signs (Most Recent):  BP: 118/70 (06/09/25 1014) Vital Signs (24h Range):  [unfilled]     Weight: 68.9 kg (151 lb 14.4 oz)  Body mass index is 30.68 kg/m².  Patient's last menstrual period was 06/03/2025 (approximate).    Physical Exam  PE:  APPEARANCE: Well nourished, well developed, in no acute distress.  AFFECT: WNL, alert and oriented x 3.  SKIN: No lesions.  NECK: Neck symmetric without masses or thyromegaly.  NODES: No inguinal, cervical, axillary or femoral lymph node enlargement.  CHEST: Good respiratory effort.   ABDOMEN: Soft. No tenderness or masses. No hepatosplenomegaly. No hernias.  BREASTS: Symmetrical, no skin changes or visible lesions. No palpable masses, nipple discharge bilaterally.  PELVIC: NL EXTERNAL FEMALE GENITALIA without lesions. Normal hair distribution. Adequate perineal body, normal urethral meatus. Vagina dry without lesions or discharge. CERVIX STENOTIC without lesions, discharge or tenderness. No significant cystocele or rectocele. Bimanual exam shows  uterus to be normal size, regular, mobile and nontender. Adnexa without masses or tenderness.  RECTAL: Rectovaginal exam confirms above with normal sphincter tone, no masses.  EXTREMITIES: No edema.        Assessment/Plan:     There are no hospital problems to display for this patient.      Fibroids    Will prepare for surgery as discussed- RA SHE FARLEY    The risks benefits and alternatives of surgery discussed in detail  Oxy rx given   Post op appt given  Pelvic rest X 8 wks     Jeniffer Butt MD  Obstetrics & Gynecology  [facility]

## 2025-06-09 NOTE — DISCHARGE INSTRUCTIONS
Information to Prepare you for your Surgery    PRE-ADMIT TESTING -  [phone]   -[facility]  [address] [place] [address] [facility] [address]  [facility]      Your surgery has been scheduled at [facility]. We are pleased to have the opportunity to serve you. For Further Information please call [phone].    On the day of surgery please report to the Information Desk on the 1st floor.    CONTACT YOUR PHYSICIAN'S OFFICE THE DAY PRIOR TO YOUR SURGERY TO OBTAIN YOUR ARRIVAL TIME.     The evening before surgery do not eat anything after 9 p.m. ( this includes hard candy, chewing gum and mints). You may only have GATORADE, POWERADE AND WATER  from 9 p.m. until you leave your home.    AT LEAST 12 OUNCES BEFORE YOU LEAVE YOU HOUSE IN THE MORNING TO COME TO SURGERY.    DO NOT DRINK ANY LIQUIDS ON THE WAY TO THE HOSPITAL.      Why does your anesthesiologist allow you to drink Gatorade/Powerade before surgery?  Gatorade/Powerade helps to increase your comfort before surgery and to decrease your nausea after surgery. The carbohydrates in Gatorade/Powerade help reduce your body's stress response to surgery.    Outpatient Surgery- May allow 2 adult (18 and older) Support Persons (1 being the designated ) for all surgical/procedural patients. A breastfeeding mother will be allowed her infant and 2 adult Support Persons. No one under the age of 18 will be allowed in the building. No swapping out of visitors in the [facility].      SPECIAL MEDICATION INSTRUCTIONS: TAKE medications checked off  on your Medication List.    Please bring first morning urine in specimen cup the day of surgery.          Surgery Patients:    If you take ASPIRIN - Your PHYSICIAN/SURGEON will need to inform you IF/OR when you need to stop taking aspirin prior to your surgery.     The week prior to surgery do not ot take any medications containing IBUPROFEN or NSAIDS (  Advil, Motrin, Goodys, BC, Aleve, Naproxen,  Ketorolac, Meloxicam, Mobic, Toradol,etc) If you are not sure if you should take a medicine please call your surgeon's office.  Ok to take Tylenol    Do Not Wear any make-up (especially eye make-up) to surgery. Please remove any false eyelashes or eyelash extensions. If you arrive the day of surgery with makeup/eyelashes on you will be required to remove prior to surgery. (There is a risk of corneal abrasions if eye makeup/eyelash extensions are not removed)      Leave all valuables at home.   Do Not wear any jewelry or watches, including any metal in body piercings. Jewelry must be removed prior to coming to the hospital.  There is a possibility that rings that are unable to be removed may be cut off if they are on the surgical extremity.    Please remove all hair extensions, wigs, clips and any other metal accessories/ ornaments from your hair.  These items may pose a flammable/fire risk in Surgery and must be removed.    Do not shave your surgical area at least 5 days prior to your surgery. The surgical prep will be performed at the hospital according to Infection Control regulations.    Contact Lens must be removed before surgery. Either do not wear the contact lens or bring a case and solution for storage.  Please bring a container for eyeglasses or dentures as required.  Bring any paperwork your physician has provided, such as consent forms,  history and physicals, doctor's orders, etc.   Bring comfortable clothes that are loose fitting to wear upon discharge. Take into consideration the type of surgery being performed.  Maintain your diet as advised per your physician the day prior to surgery.      Adequate rest the night before surgery is advised.   Park in the Parking lot behind the hospital or in the [employer] [facility] across the street from the parking lot. Parking is complimentary.  If you will be discharged the same day as your procedure, please arrange  for a responsible adult to drive you home or to accompany you if traveling by taxi.   YOU WILL NOT BE PERMITTED TO DRIVE OR TO LEAVE THE HOSPITAL ALONE AFTER SURGERY.   If you are being discharged the same day, it is strongly recommended that you arrange for someone to remain with you for the first 24 hrs following your surgery.    The Surgeon will speak to your family/visitor after your surgery regarding the outcome of your surgery and post op care.  The Surgeon may speak to you after your surgery, but there is a possibility you may not remember the details.  Please check with your family members regarding the conversation with the Surgeon.    We strongly recommend whoever is bringing you home be present for discharge instructions.  This will ensure a thorough understanding for your post op home care.    If the patient has fever, cough, or signs/symptoms of Flu or Covid please do not come in for your surgery. Contact your surgeon and your primary care physician for further instructions.           Thank you for your cooperation.  The Staff of [facility].            Bathing Instructions with Hibiclens or Dial Soap    Shower the evening before and morning of your procedure with Chlorhexidine (Hibiclens)  do not use Chlorhexidine on your face or genitals. Do not get in your eyes.  Wash your face with water and your regular face wash/soap  Use your regular shampoo  Apply Chlorhexidine (Hibiclens) directly on your skin or on a wet washcloth and wash gently. When showering: Move away from the shower stream when applying Chlorhexidine (Hibiclens) to avoid rinsing off too soon.  Rinse thoroughly with warm water  Do not dilute Chlorhexidine (Hibiclens)   Dry off as usual, do not use any deodorant, powder, body lotions, perfume, after shave or cologne.   Place clean sheets on bed day before surgery.    Thanks [facility]eli

## 2025-06-09 NOTE — PROGRESS NOTES
[facility]  Obstetrics & Gynecology  History & Physical    Patient Name: Chasity Kwan  MRN: [id number]  Admission Date: (Not on file)  Primary Care Provider: Odilon Alas MD    Subjective:     Chief Complaint/Reason for Admission: PRE OP  FIbroids    History of Present Illness:      Chasity Kwan is a 44 y.o. female  presents for PRE OP for a [facility] BS for  FIBROIDS  Her fibroids are symptomatic and problematic. She is ready to proceed with definitive management       FINDINGS:  Uterus measures 8.3 x 5.2 x 6.5 cm.  Endometrial thickness estimated at 0.4 cm.     I count at least 15 intramural and subserosal leiomyomas.  These are all fairly similar in appearance, of fairly homogeneous T2 signal hypointensity with hypoenhancement following contrast.     -1 of the largest is a broad-based subserosal leiomyoma at the fundus on the left, 3.4 x 2.7 x 2.9 cm.     -anterior uterine body intramural leiomyoma, 2.4 x 2.4 x 2.5 cm.     Right ovary measures 2.6 x 2.3 x 2.2 cm.  Corpus luteum.  No suspicious adnexal mass.     Left ovary measures 2.4 x 1.5 x 1.1 cm.  No suspicious adnexal mass.     Urinary bladder is unremarkable.  Bowel loops appear normal caliber.  No significant free fluid in the pelvis.     Regional skeleton shows degenerative change L4-5.     Impression:     At least 15 intramural and broad-based subserosal leiomyomas measuring up to 2.6 cm.        Current Outpatient Medications on File Prior to Visit   Medication Sig    ARIPiprazole (ABILIFY) 2 MG Tab Take 2 mg by mouth.    clonazePAM (KLONOPIN) 1 MG tablet Take 1 mg by mouth 2 (two) times daily as needed.    ergocalciferol, vitamin D2, (VITAMIN D ORAL) Take by mouth.    EScitalopram oxalate (LEXAPRO) 20 MG tablet     gabapentin (NEURONTIN) 300 MG capsule Take 1 capsule (300 mg total) by mouth every evening.    medroxyPROGESTERone (PROVERA) 10 MG tablet Take 1 tablet (10 mg total) by mouth once daily.    multivitamin  (THERAGRAN) per tablet Take 1 tablet by mouth once daily. Patient to stop[ 1 week before surgery starting 3/18/15    omeprazole (PRILOSEC) 40 MG capsule Take 1 capsule (40 mg total) by mouth every morning. Open capsule and take contents by mouth    rosuvastatin (CRESTOR) 40 MG Tab TAKE 1 TABLET BY MOUTH ONCE  DAILY    sumatriptan (IMITREX) 50 MG tablet Once for severe headache. May repeat once after 2 hours. Do not exceed 3-4 doses in one week. NO FURTHER REFILL WITHOUT APT    tirzepatide 10 mg/0.5 mL PnIj Inject 10 mg into the skin every 7 days.    tirzepatide, weight loss, 15 mg/0.5 mL PnIj Inject 15 mg into the skin.    valACYclovir (VALTREX) 500 MG tablet TAKE 1 TABLET EVERY DAY AS NEEDED    zolpidem (AMBIEN CR) 6.25 MG CR tablet Take 1 tablet by mouth every evening.     [DISCONTINUED] clindamycin (CLEOCIN T) 1 % lotion Apply to affected areas of face BID prn acne.    [DISCONTINUED] tretinoin (RETIN-A) 0.05 % cream Apply a pea-sized amount to entire face qhs.     No current facility-administered medications on file prior to visit.       Review of patient's allergies indicates:  No Known Allergies    Past Medical History:   Diagnosis Date    Abnormal Pap smear     colpo    Genital herpes in women     H/O: myomectomy     EX lap    History of uterine fibroid     Hyperlipidemia     Hypertension     no longer on medication    Morbid obesity     Pseudotumor cerebri     Sleep apnea     cpap uses irregularly    Wears contact lenses     Wears prescription eyeglasses      OB History    Para Term  AB Living   0 0 0 0 0 0   SAB IAB Ectopic Multiple Live Births   0 0 0 0      Past Surgical History:   Procedure Laterality Date    COLONOSCOPY N/A 2022    Procedure: COLONOSCOPY;  Surgeon: Michelle Denton MD;  Location: [facility] ([address] [place] [address]);  Service: Endoscopy;  Laterality: N/A;  instr portal-GT  left vm  mal    ESOPHAGOGASTRODUODENOSCOPY      GASTRECTOMY      gastric sleeve      INJECTION,  SACROILIAC JOINT Left 5/14/2025    Procedure: INJECTION,SACROILIAC JOINT LEFT;  Surgeon: Marcos Orourke MD;  [facility];  Service: Pain Management;  Laterality: Left;  2 WK F/U DIANE    LAPAROTOMY      SI joint injection       Family History       Problem Relation (Age of Onset)    Cancer Father    Cataracts Father    Colon cancer Father (50)    Diabetes Paternal Grandmother    Graves' disease Mother    Heart disease Maternal Grandfather    Hyperlipidemia Mother, Sister    Hypertension Mother    No Known Problems Paternal Grandfather    Obesity Mother, Sister    Stroke Maternal Grandmother          Tobacco Use    Smoking status: Never     Passive exposure: Past    Smokeless tobacco: Never   Vaping Use    Vaping status: Never Used   Substance and Sexual Activity    Alcohol use: Yes     Comment: occasionally    Drug use: No    Sexual activity: Not Currently     Partners: Male     Review of Systems  Objective:     Vital Signs (Most Recent):  BP: 118/70 (06/09/25 1014) Vital Signs (24h Range):  [unfilled]     Weight: 68.9 kg (151 lb 14.4 oz)  Body mass index is 30.68 kg/m².  Patient's last menstrual period was 06/03/2025 (approximate).    Physical Exam  PE:  APPEARANCE: Well nourished, well developed, in no acute distress.  AFFECT: WNL, alert and oriented x 3.  SKIN: No lesions.  NECK: Neck symmetric without masses or thyromegaly.  NODES: No inguinal, cervical, axillary or femoral lymph node enlargement.  CHEST: Good respiratory effort.   ABDOMEN: Soft. No tenderness or masses. No hepatosplenomegaly. No hernias.  BREASTS: Symmetrical, no skin changes or visible lesions. No palpable masses, nipple discharge bilaterally.  PELVIC: NL EXTERNAL FEMALE GENITALIA without lesions. Normal hair distribution. Adequate perineal body, normal urethral meatus. Vagina dry without lesions or discharge. CERVIX STENOTIC without lesions, discharge or tenderness. No significant cystocele or rectocele. Bimanual exam shows  uterus to be normal size, regular, mobile and nontender. Adnexa without masses or tenderness.  RECTAL: Rectovaginal exam confirms above with normal sphincter tone, no masses.  EXTREMITIES: No edema.        Assessment/Plan:     There are no hospital problems to display for this patient.      Fibroids    Will prepare for surgery as discussed- RA SHE FARLEY    The risks benefits and alternatives of surgery discussed in detail  Oxy rx given   Post op appt given  Pelvic rest X 8 wks     Jeniffer Butt MD  Obstetrics & Gynecology  [facility]

## 2025-06-16 DIAGNOSIS — E78.5 HYPERLIPIDEMIA, UNSPECIFIED HYPERLIPIDEMIA TYPE: ICD-10-CM

## 2025-06-16 DIAGNOSIS — B00.9 HERPES: ICD-10-CM

## 2025-06-16 RX ORDER — VALACYCLOVIR HYDROCHLORIDE 500 MG/1
500 TABLET, FILM COATED ORAL
Qty: 30 TABLET | Refills: 11 | Status: SHIPPED | OUTPATIENT
Start: 2025-06-16

## 2025-06-16 RX ORDER — ROSUVASTATIN CALCIUM 40 MG/1
40 TABLET, COATED ORAL
Qty: 90 TABLET | Refills: 3 | Status: SHIPPED | OUTPATIENT
Start: 2025-06-16

## 2025-06-17 ENCOUNTER — TELEPHONE (OUTPATIENT)
Dept: OBSTETRICS AND GYNECOLOGY | Facility: CLINIC | Age: 45
End: 2025-06-17
Payer: COMMERCIAL

## 2025-06-17 NOTE — TELEPHONE ENCOUNTER
No care due was identified.  [facility] Embedded Care Due Messages. Reference number: [id number].   6/16/2025 8:47:24 PM CDT

## 2025-06-17 NOTE — TELEPHONE ENCOUNTER
Refill Decision Note   Chasity Aquiles  is requesting a refill authorization.  Brief Assessment and Rationale for Refill:  Approve     Medication Therapy Plan:         Comments:     Note composed:10:12 PM 06/16/2025

## 2025-06-17 NOTE — TELEPHONE ENCOUNTER
I called pt to advised surgery has been pushed up. Pt advised of 5am arrival time, and verbalized understanding,

## 2025-06-18 ENCOUNTER — HOSPITAL ENCOUNTER (OUTPATIENT)
Facility: OTHER | Age: 45
Discharge: HOME | End: 2025-06-18
Attending: OBSTETRICS & GYNECOLOGY | Admitting: OBSTETRICS & GYNECOLOGY
Payer: COMMERCIAL

## 2025-06-18 ENCOUNTER — ANESTHESIA (OUTPATIENT)
Dept: SURGERY | Facility: OTHER | Age: 45
End: 2025-06-18
Payer: COMMERCIAL

## 2025-06-18 DIAGNOSIS — D21.9 FIBROIDS: ICD-10-CM

## 2025-06-18 DIAGNOSIS — R10.2 PELVIC PAIN IN FEMALE: ICD-10-CM

## 2025-06-18 DIAGNOSIS — Z90.710 HISTORY OF ROBOT-ASSISTED LAPAROSCOPIC HYSTERECTOMY: Primary | ICD-10-CM

## 2025-06-18 DIAGNOSIS — Z01.419 ENCOUNTER FOR GYNECOLOGICAL EXAMINATION WITHOUT ABNORMAL FINDING: ICD-10-CM

## 2025-06-18 LAB
ABORH RETYPE: NORMAL
B-HCG UR QL: NEGATIVE
CTP QC/QA: YES
POCT GLUCOSE: 87 MG/DL (ref 70–110)

## 2025-06-18 PROCEDURE — 88307 TISSUE EXAM BY PATHOLOGIST: CPT | Mod: 26,,, | Performed by: PATHOLOGY

## 2025-06-18 PROCEDURE — 36000712 HC OR TIME LEV V 1ST 15 MIN: Performed by: OBSTETRICS & GYNECOLOGY

## 2025-06-18 PROCEDURE — 58571 TLH W/T/O 250 G OR LESS: CPT | Mod: ,,, | Performed by: OBSTETRICS & GYNECOLOGY

## 2025-06-18 PROCEDURE — 36415 COLL VENOUS BLD VENIPUNCTURE: CPT | Performed by: OBSTETRICS & GYNECOLOGY

## 2025-06-18 PROCEDURE — 25000003 PHARM REV CODE 250: Performed by: NURSE ANESTHETIST, CERTIFIED REGISTERED

## 2025-06-18 PROCEDURE — 71000039 HC RECOVERY, EACH ADD'L HOUR: Performed by: OBSTETRICS & GYNECOLOGY

## 2025-06-18 PROCEDURE — 63600175 PHARM REV CODE 636 W HCPCS: Performed by: ANESTHESIOLOGY

## 2025-06-18 PROCEDURE — 37000008 HC ANESTHESIA 1ST 15 MINUTES: Performed by: OBSTETRICS & GYNECOLOGY

## 2025-06-18 PROCEDURE — 81025 URINE PREGNANCY TEST: CPT | Performed by: OBSTETRICS & GYNECOLOGY

## 2025-06-18 PROCEDURE — 37000009 HC ANESTHESIA EA ADD 15 MINS: Performed by: OBSTETRICS & GYNECOLOGY

## 2025-06-18 PROCEDURE — 36000713 HC OR TIME LEV V EA ADD 15 MIN: Performed by: OBSTETRICS & GYNECOLOGY

## 2025-06-18 PROCEDURE — 27201423 OPTIME MED/SURG SUP & DEVICES STERILE SUPPLY: Performed by: OBSTETRICS & GYNECOLOGY

## 2025-06-18 PROCEDURE — 25000003 PHARM REV CODE 250

## 2025-06-18 PROCEDURE — 88307 TISSUE EXAM BY PATHOLOGIST: CPT | Mod: TC | Performed by: OBSTETRICS & GYNECOLOGY

## 2025-06-18 PROCEDURE — 63600175 PHARM REV CODE 636 W HCPCS: Mod: JZ,TB | Performed by: NURSE ANESTHETIST, CERTIFIED REGISTERED

## 2025-06-18 PROCEDURE — 71000015 HC POSTOP RECOV 1ST HR: Performed by: OBSTETRICS & GYNECOLOGY

## 2025-06-18 PROCEDURE — 63600175 PHARM REV CODE 636 W HCPCS: Performed by: NURSE ANESTHETIST, CERTIFIED REGISTERED

## 2025-06-18 PROCEDURE — 63600175 PHARM REV CODE 636 W HCPCS: Performed by: OBSTETRICS & GYNECOLOGY

## 2025-06-18 PROCEDURE — 25000003 PHARM REV CODE 250: Performed by: OBSTETRICS & GYNECOLOGY

## 2025-06-18 PROCEDURE — 71000016 HC POSTOP RECOV ADDL HR: Performed by: OBSTETRICS & GYNECOLOGY

## 2025-06-18 PROCEDURE — 71000033 HC RECOVERY, INTIAL HOUR: Performed by: OBSTETRICS & GYNECOLOGY

## 2025-06-18 RX ORDER — BUPIVACAINE HYDROCHLORIDE 2.5 MG/ML
INJECTION, SOLUTION EPIDURAL; INFILTRATION; INTRACAUDAL; PERINEURAL
Status: DISCONTINUED | OUTPATIENT
Start: 2025-06-18 | End: 2025-06-18 | Stop reason: HOSPADM

## 2025-06-18 RX ORDER — ACETAMINOPHEN 500 MG
1000 TABLET ORAL
Status: DISCONTINUED | OUTPATIENT
Start: 2025-06-18 | End: 2025-06-18 | Stop reason: HOSPADM

## 2025-06-18 RX ORDER — LIDOCAINE HYDROCHLORIDE 20 MG/ML
INJECTION INTRAVENOUS
Status: DISCONTINUED | OUTPATIENT
Start: 2025-06-18 | End: 2025-06-18

## 2025-06-18 RX ORDER — IBUPROFEN 600 MG/1
600 TABLET, FILM COATED ORAL EVERY 6 HOURS PRN
Qty: 30 TABLET | Refills: 0 | Status: SHIPPED | OUTPATIENT
Start: 2025-06-18

## 2025-06-18 RX ORDER — GLUCAGON 1 MG
1 KIT INJECTION
Status: DISCONTINUED | OUTPATIENT
Start: 2025-06-18 | End: 2025-06-18 | Stop reason: HOSPADM

## 2025-06-18 RX ORDER — ONDANSETRON HYDROCHLORIDE 2 MG/ML
INJECTION, SOLUTION INTRAVENOUS
Status: DISCONTINUED | OUTPATIENT
Start: 2025-06-18 | End: 2025-06-18

## 2025-06-18 RX ORDER — SODIUM CHLORIDE 0.9 % (FLUSH) 0.9 %
3 SYRINGE (ML) INJECTION
Status: DISCONTINUED | OUTPATIENT
Start: 2025-06-18 | End: 2025-06-18 | Stop reason: HOSPADM

## 2025-06-18 RX ORDER — PROCHLORPERAZINE EDISYLATE 5 MG/ML
5 INJECTION INTRAMUSCULAR; INTRAVENOUS EVERY 10 MIN PRN
Status: DISCONTINUED | OUTPATIENT
Start: 2025-06-18 | End: 2025-06-18 | Stop reason: HOSPADM

## 2025-06-18 RX ORDER — LIDOCAINE HYDROCHLORIDE 10 MG/ML
0.5 INJECTION, SOLUTION EPIDURAL; INFILTRATION; INTRACAUDAL; PERINEURAL ONCE
Status: DISCONTINUED | OUTPATIENT
Start: 2025-06-18 | End: 2025-06-18 | Stop reason: HOSPADM

## 2025-06-18 RX ORDER — PROPOFOL 10 MG/ML
VIAL (ML) INTRAVENOUS
Status: DISCONTINUED | OUTPATIENT
Start: 2025-06-18 | End: 2025-06-18

## 2025-06-18 RX ORDER — ROCURONIUM BROMIDE 10 MG/ML
INJECTION, SOLUTION INTRAVENOUS
Status: DISCONTINUED | OUTPATIENT
Start: 2025-06-18 | End: 2025-06-18

## 2025-06-18 RX ORDER — DEXAMETHASONE SODIUM PHOSPHATE 4 MG/ML
INJECTION, SOLUTION INTRA-ARTICULAR; INTRALESIONAL; INTRAMUSCULAR; INTRAVENOUS; SOFT TISSUE
Status: DISCONTINUED | OUTPATIENT
Start: 2025-06-18 | End: 2025-06-18

## 2025-06-18 RX ORDER — HYDROMORPHONE HYDROCHLORIDE 2 MG/ML
0.4 INJECTION, SOLUTION INTRAMUSCULAR; INTRAVENOUS; SUBCUTANEOUS EVERY 5 MIN PRN
Status: DISCONTINUED | OUTPATIENT
Start: 2025-06-18 | End: 2025-06-18

## 2025-06-18 RX ORDER — HYDROMORPHONE HYDROCHLORIDE 2 MG/ML
0.2 INJECTION, SOLUTION INTRAMUSCULAR; INTRAVENOUS; SUBCUTANEOUS EVERY 5 MIN PRN
Status: DISCONTINUED | OUTPATIENT
Start: 2025-06-18 | End: 2025-06-18 | Stop reason: HOSPADM

## 2025-06-18 RX ORDER — OXYCODONE HYDROCHLORIDE 5 MG/1
5 TABLET ORAL
Status: DISCONTINUED | OUTPATIENT
Start: 2025-06-18 | End: 2025-06-18 | Stop reason: HOSPADM

## 2025-06-18 RX ORDER — SODIUM CHLORIDE, SODIUM LACTATE, POTASSIUM CHLORIDE, CALCIUM CHLORIDE 600; 310; 30; 20 MG/100ML; MG/100ML; MG/100ML; MG/100ML
INJECTION, SOLUTION INTRAVENOUS CONTINUOUS
Status: DISCONTINUED | OUTPATIENT
Start: 2025-06-18 | End: 2025-06-18 | Stop reason: HOSPADM

## 2025-06-18 RX ORDER — DOCUSATE SODIUM 100 MG/1
100 CAPSULE, LIQUID FILLED ORAL 2 TIMES DAILY
Qty: 60 CAPSULE | Refills: 0 | Status: SHIPPED | OUTPATIENT
Start: 2025-06-18

## 2025-06-18 RX ORDER — PREGABALIN 50 MG/1
50 CAPSULE ORAL
Status: COMPLETED | OUTPATIENT
Start: 2025-06-18 | End: 2025-06-18

## 2025-06-18 RX ORDER — FAMOTIDINE 20 MG/1
20 TABLET, FILM COATED ORAL
Status: COMPLETED | OUTPATIENT
Start: 2025-06-18 | End: 2025-06-18

## 2025-06-18 RX ORDER — OXYCODONE HYDROCHLORIDE 5 MG/1
5 TABLET ORAL EVERY 6 HOURS PRN
Qty: 20 TABLET | Refills: 0 | Status: SHIPPED | OUTPATIENT
Start: 2025-06-18

## 2025-06-18 RX ORDER — MUPIROCIN 20 MG/G
OINTMENT TOPICAL
Status: COMPLETED | OUTPATIENT
Start: 2025-06-18 | End: 2025-06-18

## 2025-06-18 RX ORDER — FENTANYL CITRATE 50 UG/ML
INJECTION, SOLUTION INTRAMUSCULAR; INTRAVENOUS
Status: DISCONTINUED | OUTPATIENT
Start: 2025-06-18 | End: 2025-06-18

## 2025-06-18 RX ORDER — LIDOCAINE HYDROCHLORIDE 10 MG/ML
0.5 INJECTION, SOLUTION EPIDURAL; INFILTRATION; INTRACAUDAL; PERINEURAL
Status: DISCONTINUED | OUTPATIENT
Start: 2025-06-18 | End: 2025-06-18 | Stop reason: HOSPADM

## 2025-06-18 RX ORDER — MEPERIDINE HYDROCHLORIDE 25 MG/ML
12.5 INJECTION INTRAMUSCULAR; INTRAVENOUS; SUBCUTANEOUS ONCE AS NEEDED
Status: DISCONTINUED | OUTPATIENT
Start: 2025-06-18 | End: 2025-06-18 | Stop reason: HOSPADM

## 2025-06-18 RX ORDER — ACETAMINOPHEN 500 MG
1000 TABLET ORAL
Status: COMPLETED | OUTPATIENT
Start: 2025-06-18 | End: 2025-06-18

## 2025-06-18 RX ORDER — ONDANSETRON HYDROCHLORIDE 2 MG/ML
4 INJECTION, SOLUTION INTRAVENOUS DAILY PRN
Status: DISCONTINUED | OUTPATIENT
Start: 2025-06-18 | End: 2025-06-18 | Stop reason: HOSPADM

## 2025-06-18 RX ORDER — CELECOXIB 200 MG/1
400 CAPSULE ORAL
Status: COMPLETED | OUTPATIENT
Start: 2025-06-18 | End: 2025-06-18

## 2025-06-18 RX ORDER — DEXTROMETHORPHAN HYDROBROMIDE, GUAIFENESIN 5; 100 MG/5ML; MG/5ML
650 LIQUID ORAL EVERY 6 HOURS PRN
Qty: 30 TABLET | Refills: 0 | Status: SHIPPED | OUTPATIENT
Start: 2025-06-18

## 2025-06-18 RX ORDER — MIDAZOLAM HYDROCHLORIDE 1 MG/ML
INJECTION INTRAMUSCULAR; INTRAVENOUS
Status: DISCONTINUED | OUTPATIENT
Start: 2025-06-18 | End: 2025-06-18

## 2025-06-18 RX ORDER — KETAMINE HCL IN 0.9 % NACL 50 MG/5 ML
SYRINGE (ML) INTRAVENOUS
Status: DISCONTINUED | OUTPATIENT
Start: 2025-06-18 | End: 2025-06-18

## 2025-06-18 RX ORDER — CEFAZOLIN 2 G/1
2 INJECTION, POWDER, FOR SOLUTION INTRAMUSCULAR; INTRAVENOUS
Status: COMPLETED | OUTPATIENT
Start: 2025-06-18 | End: 2025-06-18

## 2025-06-18 RX ORDER — KETOROLAC TROMETHAMINE 30 MG/ML
INJECTION, SOLUTION INTRAMUSCULAR; INTRAVENOUS
Status: DISCONTINUED | OUTPATIENT
Start: 2025-06-18 | End: 2025-06-18

## 2025-06-18 RX ADMIN — HYDROMORPHONE HYDROCHLORIDE 0.2 MG: 2 INJECTION INTRAMUSCULAR; INTRAVENOUS; SUBCUTANEOUS at 10:06

## 2025-06-18 RX ADMIN — HYDROMORPHONE HYDROCHLORIDE 0.4 MG: 2 INJECTION INTRAMUSCULAR; INTRAVENOUS; SUBCUTANEOUS at 10:06

## 2025-06-18 RX ADMIN — ROCURONIUM BROMIDE 50 MG: 10 INJECTION INTRAVENOUS at 07:06

## 2025-06-18 RX ADMIN — CEFAZOLIN 2 G: 2 INJECTION, POWDER, FOR SOLUTION INTRAMUSCULAR; INTRAVENOUS at 07:06

## 2025-06-18 RX ADMIN — LIDOCAINE HYDROCHLORIDE 100 MG: 20 INJECTION, SOLUTION INTRAVENOUS at 07:06

## 2025-06-18 RX ADMIN — ROCURONIUM BROMIDE 20 MG: 10 INJECTION INTRAVENOUS at 08:06

## 2025-06-18 RX ADMIN — FENTANYL CITRATE 100 MCG: 50 INJECTION, SOLUTION INTRAMUSCULAR; INTRAVENOUS at 07:06

## 2025-06-18 RX ADMIN — SODIUM CHLORIDE, SODIUM LACTATE, POTASSIUM CHLORIDE, AND CALCIUM CHLORIDE: .6; .31; .03; .02 INJECTION, SOLUTION INTRAVENOUS at 08:06

## 2025-06-18 RX ADMIN — GLYCOPYRROLATE 0.2 MG: 0.2 INJECTION, SOLUTION INTRAMUSCULAR; INTRAVITREAL at 07:06

## 2025-06-18 RX ADMIN — ACETAMINOPHEN 1000 MG: 500 TABLET, FILM COATED ORAL at 05:06

## 2025-06-18 RX ADMIN — ONDANSETRON HYDROCHLORIDE 4 MG: 2 INJECTION INTRAMUSCULAR; INTRAVENOUS at 07:06

## 2025-06-18 RX ADMIN — MUPIROCIN: 20 OINTMENT TOPICAL at 05:06

## 2025-06-18 RX ADMIN — MIDAZOLAM HYDROCHLORIDE 2 MG: 1 INJECTION, SOLUTION INTRAMUSCULAR; INTRAVENOUS at 07:06

## 2025-06-18 RX ADMIN — Medication 25 MG: at 07:06

## 2025-06-18 RX ADMIN — SUGAMMADEX 200 MG: 100 INJECTION, SOLUTION INTRAVENOUS at 09:06

## 2025-06-18 RX ADMIN — SODIUM CHLORIDE, SODIUM LACTATE, POTASSIUM CHLORIDE, AND CALCIUM CHLORIDE: .6; .31; .03; .02 INJECTION, SOLUTION INTRAVENOUS at 06:06

## 2025-06-18 RX ADMIN — Medication 25 MG: at 08:06

## 2025-06-18 RX ADMIN — FAMOTIDINE 20 MG: 20 TABLET, FILM COATED ORAL at 05:06

## 2025-06-18 RX ADMIN — DEXAMETHASONE SODIUM PHOSPHATE 8 MG: 4 INJECTION, SOLUTION INTRAMUSCULAR; INTRAVENOUS at 07:06

## 2025-06-18 RX ADMIN — KETOROLAC TROMETHAMINE 30 MG: 30 INJECTION, SOLUTION INTRAMUSCULAR; INTRAVENOUS at 09:06

## 2025-06-18 RX ADMIN — CELECOXIB 400 MG: 200 CAPSULE ORAL at 05:06

## 2025-06-18 RX ADMIN — PREGABALIN 50 MG: 50 CAPSULE ORAL at 05:06

## 2025-06-18 RX ADMIN — PROCHLORPERAZINE EDISYLATE 5 MG: 5 INJECTION INTRAMUSCULAR; INTRAVENOUS at 10:06

## 2025-06-18 RX ADMIN — FENTANYL CITRATE 50 MCG: 50 INJECTION, SOLUTION INTRAMUSCULAR; INTRAVENOUS at 08:06

## 2025-06-18 RX ADMIN — PROPOFOL 160 MG: 10 INJECTION, EMULSION INTRAVENOUS at 07:06

## 2025-06-18 RX ADMIN — CARBOXYMETHYLCELLULOSE SODIUM 2 DROP: 2.5 SOLUTION/ DROPS OPHTHALMIC at 07:06

## 2025-06-18 NOTE — OPERATIVE NOTE ADDENDUM
Certification of Assistant at Surgery       Surgery Date: 6/18/2025     Participating Surgeons:  Surgeons and Role:     * Jeniffer Butt MD - Primary     * Lisset Calle MD - Resident - Assisting    Procedures:  Procedure(s) (LRB):  DV5 ROBOTIC HYSTERECTOMY (N/A)  DV5 ROBOTIC SALPINGECTOMY (Bilateral)    Assistant Surgeon's Certification of Necessity:  I understand that section 1842 (b) (6) (d) of the Social Security Act generally prohibits Medicare Part B reasonable charge payment for the services of assistants at surgery in teaching hospitals when qualified residents are available to furnish such services. I certify that the services for which payment is claimed were medically necessary, and that no qualified resident was available to perform the services. I further understand that these services are subject to post-payment review by the Medicare carrier.      CROW Craig    06/18/2025  9:07 AM

## 2025-06-18 NOTE — ANESTHESIA PROCEDURE NOTES
Intubation    Date/Time: 6/18/2025 7:37 AM    Performed by: Di Pitts CRNA  Authorized by: Ernesto Painting MD    Intubation:     Induction:  Intravenous    Intubated:  Postinduction    Mask Ventilation:  Easy mask    Attempts:  1    Attempted By:  CRNA    Method of Intubation:  Video laryngoscopy    Blade:  Herzog 3    Laryngeal View Grade: Grade I - full view of cords      Difficult Airway Encountered?: No      Complications:  None    Airway Device:  Oral endotracheal tube    Airway Device Size:  7.5    Style/Cuff Inflation:  Cuffed    Tube secured:  21    Secured at:  The lips    Placement Verified By:  Capnometry    Complicating Factors:  None    Findings Post-Intubation:  BS equal bilateral and atraumatic/condition of teeth unchanged

## 2025-06-18 NOTE — INTERVAL H&P NOTE
The patient has been examined and the H&P has been reviewed:    I concur with the findings and no changes have occurred since H&P was written.    Surgery risks, benefits and alternative options discussed and understood by patient/family.    Chasity Kwan is 45 y.o.  presenting for scheduled RA-TLH/BS.    Temp:  [97.8 °F (36.6 °C)] 97.8 °F (36.6 °C)  Pulse:  [65] 65  Resp:  [18] 18  SpO2:  [100 %] 100 %  BP: (109)/(67) 109/67    General: NAD, alert, oriented, cooperative  HEENT: NCAT, EOM grossly intact  Lungs: Normal WOB  Heart: regular rate  Abdomen: soft, nondistended, nontender, no rebound or guarding    Consents in chart. Pre-operative heparin not indicated. All questions answered and concerns addressed. To OR for planned procedure.      Gwen Lepe MD  Obstetrics & Gynecology, PGY-1        There are no hospital problems to display for this patient.

## 2025-06-18 NOTE — OP NOTE
OPERATIVE REPORT     DATE: 06/18/2025       PREOPERATIVE DIAGNOSIS  1. AUB-L      POSTOPERATIVE DIAGNOSIS  Same  S/p RA-TLH/BS     PROCEDURE:  1. Robotic Assisted Total Laparoscopic Hysterectomy with Bilateral Salpingectomy     SURGEON: Jeniffer Butt MD     ASSISTANTS: Dona Bustos MD PGY3     ANESTHESIA: General     COMPLICATIONS: None     EBL: 100cc     IV FLUIDS: See anesthesia report     URINE OUTPUT: See anesthesia report     FINDINGS:   Large multifibroid uterus with multiple subserosal fibroids distorting the broad ligaments bilaterally. Moderate adhesions of bowel to lateral side walls, omentum to anterior abdominal wall, of the bladder to the anterior uterine serosa, and of the fallopian tubes to the ovaries. Endometrioma-like cyst of the left ovary that was drained. RA-TLH/BS performed without difficulty. Vaginal cuff closed with 0 V-loc suture. Pelvic exam at end of case with no vaginal injuries or tears.     SPECIMENS: cervix, uterus, bilateral fallopian tubes    PROCEDURE IN DETAIL:   Risks, benefits, and alternatives to DaVinci-assisted hysterectomy with ovarian preservation were discussed in detail with the patient in clinic prior to day of surgery, and consents were signed.    Interval history and physical were done, consents reviewed, and patient was taken to operating room for the above-mentioned procedures. A time out was performed and correct. After induction of general endotracheal anesthesia, patient was prepped and draped both vaginally and abdominally in the usual sterile fashion in the dorsal lithotomy position with arms tucked. Graham catheter was placed. Uterus was sounded to 9cm. SATHISH uterine manipulator was placed without complication.    Attention was turned to the abdomen, where a Veress needle was inserted through the umbilicus under tenting of the anterior abdominal.  Placement in the abdominal cavity was confirmed with saline drop test and low intra-abdominal pressure.  The abdomen  was then insufflated with carbon dioxide gas to 15 mm Hg.  A supraumbilical incision was made and taken down to the level of the fascia. A 8 mm DA Jannie robotic trocar was inserted into the abdomen. The camera was placed through the trocar and correct entry was confirmed. The patient was placed in deep trendelenburg. Under direct visualization, right and left lateral 8 mm DaVinci ports were placed. A total of 3 ports were used for this procedure. Complete abdominal survey revealed no injury secondary to abdominal entry, as well as normal appearing liver edge, cardiac window, stomach, small and large bowel, and appendix. Inspection of the pelvis revealed large multifibroid uterus with multiple subserosal fibroids distorting the broad ligaments bilaterally. There were moderate adhesions of bowel to lateral side walls, omentum to anterior abdominal wall, of the bladder to the anterior uterine serosa, and of the fallopian tubes to the ovaries. There was a endometrioma-like cyst of the left ovary that was drained.      Attention was then turned to the hysterectomy portion of the procedure. The adhesions in the left adnexa were carefully taken down with sharp dissection. There was no evidence of bowel injury. The bilateral ureters were identified and found to be below and well-away from the area of the surgical field. The left fallopian tube was dissected off of the left ovary. The left round ligament was identified, then cauterized and transected. The left retroperitoneal space was then opened and the anterior leaf of the broad ligament was was divided in the direction of the vesicovaginal fold. The bladder was gently dissected away from the vagina, until it was free and below the level of the cervix. The anterior colpotomy was made with visualization of the SATHISH cup. Attention was then turned to the left utero-ovarian ligament, which was then was cauterized and transected. The posterior leaf of the left broad ligament  was divided medially toward the left uterosacral ligament. Left uterine vessels were skeletonized. The posterior colpotomy was made. Attention was turned to the right round ligament, which was cauterized and transected. The right retroperitoneal space was then opened, and the anterior leaf of the broad ligament was divided to meet the bladder flap that had been developed on the left. The right fallopian tube was elevated and dissected away from the mesosalpinx to the uterine cornua, then the utero-ovarian ligament was cauterized and transected. The posterior leaf of the right broad ligament was divided medially toward the right uterosacral ligament. The right uterine vessels were skeletonized and cauterized at the level of the SATHISH cup. Bilateral ureters were visualized multiple times during the surgery to ensure safe dissection.The vascular pedicles, including the uterine arteries, were cauterized again, then transected     The anterior and posterior colpotomies were connected, freeing the uterus, cervix, and bilateral fallopian tubes. The specimen was removed from the vagina without difficulty, and hemostasis of the vaginal cuff was observed. A moistened laparotomy sponge within a glove was placed in the vagina for reocclusion.     The vaginal cuff was then sutured using 0 V-loc. Complete closure of the cuff was confirmed. The pelvis was suctioned to remove blood and clots.  Bilateral ureters were again observed vermiculating in their normal anatomic positions in the pelvis.     Complete hemostasis was noted. Robot was undocked, robotic trocars were removed from the abdomen after desufflation, and skin incisions were closed with 4-0 Monocryl in a running subcuticular fashion. Laparotomy sponge within a glove was removed from the vagina. Pelvic exam at end of case with no vaginal injuries or tears.     Sponge, lap, and needle counts were correct x2. The patient was taken to the recovery room in stable condition.         Dnoa Bustos MD   OB/GYN PGY-3

## 2025-06-18 NOTE — ANESTHESIA POSTPROCEDURE EVALUATION
Anesthesia Post Evaluation    Patient: Chasity Kwan    Procedure(s) Performed: Procedure(s) (LRB):  DV5 ROBOTIC HYSTERECTOMY (N/A)  DV5 ROBOTIC SALPINGECTOMY (Bilateral)    Final Anesthesia Type: general      Patient location during evaluation: PACU  Patient participation: Yes- Able to Participate  Level of consciousness: awake and alert  Post-procedure vital signs: reviewed and stable  Pain management: adequate  Airway patency: patent    PONV status at discharge: No PONV  Anesthetic complications: no      Cardiovascular status: blood pressure returned to baseline  Respiratory status: unassisted and spontaneous ventilation  Hydration status: euvolemic  Follow-up not needed.              Vitals Value Taken Time   BP 92/54 06/18/25 10:46   Temp 36.6 °C (97.8 °F) 06/18/25 09:37   Pulse 73 06/18/25 10:58   Resp 16 06/18/25 10:45   SpO2 95 % 06/18/25 10:58   Vitals shown include unfiled device data.      No case tracking events are documented in the log.      Pain/Crystla Score: Pain Rating Prior to Med Admin: 7 (6/18/2025 10:17 AM)  Pain Rating Post Med Admin: 4 (6/18/2025 10:45 AM)  Crystal Score: 9 (6/18/2025 10:37 AM)

## 2025-06-18 NOTE — DISCHARGE SUMMARY
"Discharge Summary  Gynecology      Admit Date: 2025    Discharge Date and Time: 2025     Attending Physician: Jeniffer Butt MD    Principal Diagnoses: History of robot-assisted laparoscopic hysterectomy    Active Hospital Problems    Diagnosis  POA    *s/p RA-TLH/BS [Z90.710]  Not Applicable      Resolved Hospital Problems   No resolved problems to display.       Procedures: Procedure(s) (LRB):  DV5 ROBOTIC HYSTERECTOMY (N/A)  DV5 ROBOTIC SALPINGECTOMY (Bilateral)    Discharged Condition: good    Hospital Course:  Chasity Kwan is a 45 y.o.  female who presented on 2025 for above procedures for the management of AUB-L. Patient tolerated procedure. Post-operative course was uncomplicated.  On day of discharge, patient was urinating, ambulating, and tolerating a regular diet without difficulty. Pain was well controlled on PO medication. She was discharged home on POD#0 in stable condition with instructions to follow up with Dr. Butt on 25.     Consults: None    Significant Diagnostic Studies:  No results for input(s): "WBC", "HGB", "HCT", "MCV", "PLT" in the last 168 hours.     Treatments:   1. Surgery as above    Disposition: Home or Self Care    Patient Instructions:   Current Discharge Medication List        START taking these medications    Details   acetaminophen (TYLENOL) 650 MG TbSR Take 1 tablet (650 mg total) by mouth every 6 (six) hours as needed.  Qty: 30 tablet, Refills: 0      docusate sodium (COLACE) 100 MG capsule Take 1 capsule (100 mg total) by mouth 2 (two) times daily.  Qty: 60 capsule, Refills: 0      ibuprofen (ADVIL,MOTRIN) 600 MG tablet Take 1 tablet (600 mg total) by mouth every 6 (six) hours as needed for Pain.  Qty: 30 tablet, Refills: 0      oxyCODONE (ROXICODONE) 5 MG immediate release tablet Take 1 tablet (5 mg total) by mouth every 6 (six) hours as needed for Pain.  Qty: 20 tablet, Refills: 0    Associated Diagnoses: History of robot-assisted " laparoscopic hysterectomy           CONTINUE these medications which have NOT CHANGED    Details   ARIPiprazole (ABILIFY) 2 MG Tab Take 2 mg by mouth.      clonazePAM (KLONOPIN) 1 MG tablet Take 1 mg by mouth 2 (two) times daily as needed.      ergocalciferol, vitamin D2, (VITAMIN D ORAL) Take by mouth.      EScitalopram oxalate (LEXAPRO) 20 MG tablet       gabapentin (NEURONTIN) 300 MG capsule Take 1 capsule (300 mg total) by mouth every evening.  Qty: 30 capsule, Refills: 1    Associated Diagnoses: Sacroiliac pain      medroxyPROGESTERone (PROVERA) 10 MG tablet Take 1 tablet (10 mg total) by mouth once daily.  Qty: 30 tablet, Refills: 6      multivitamin (THERAGRAN) per tablet Take 1 tablet by mouth once daily. Patient to stop[ 1 week before surgery starting 3/18/15      omeprazole (PRILOSEC) 40 MG capsule Take 1 capsule (40 mg total) by mouth every morning. Open capsule and take contents by mouth  Qty: 30 capsule, Refills: 11    Associated Diagnoses: GERD (gastroesophageal reflux disease)      rosuvastatin (CRESTOR) 40 MG Tab TAKE 1 TABLET BY MOUTH ONCE DAILY  Qty: 90 tablet, Refills: 3    Associated Diagnoses: Hyperlipidemia, unspecified hyperlipidemia type      valACYclovir (VALTREX) 500 MG tablet TAKE 1 TABLET EVERY DAY AS NEEDED  Qty: 30 tablet, Refills: 11    Associated Diagnoses: Herpes      zolpidem (AMBIEN CR) 6.25 MG CR tablet Take 1 tablet by mouth every evening.   Refills: 2      sumatriptan (IMITREX) 50 MG tablet Once for severe headache. May repeat once after 2 hours. Do not exceed 3-4 doses in one week. NO FURTHER REFILL WITHOUT APT  Qty: 12 tablet, Refills: 0    Associated Diagnoses: Other migraine without status migrainosus, not intractable      tirzepatide 10 mg/0.5 mL PnIj Inject 10 mg into the skin every 7 days.      tirzepatide, weight loss, 15 mg/0.5 mL PnIj Inject 15 mg into the skin.             Discharge Procedure Orders   Diet Adult Regular     Lifting restrictions   Order Comments: Avoid  lifting more than 10 pounds (or a gallon of milk) for 4 weeks.     No driving until:   Order Comments: Do not drive until you are no longer taking narcotic pain medications and you feel comfortable stepping on the brake.     Pelvic Rest   Order Comments: Nothing in the vagina including tampons and intercourse for 4 weeks.     Notify your health care provider if you experience any of the following:  temperature >100.4     Notify your health care provider if you experience any of the following:  persistent nausea and vomiting or diarrhea     Notify your health care provider if you experience any of the following:  severe uncontrolled pain     Notify your health care provider if you experience any of the following:  redness, tenderness, or signs of infection (pain, swelling, redness, odor or green/yellow discharge around incision site)     Notify your health care provider if you experience any of the following:  severe persistent headache     Notify your health care provider if you experience any of the following:  persistent dizziness, light-headedness, or visual disturbances     Notify your health care provider if you experience any of the following:  increased confusion or weakness     Notify your health care provider if you experience any of the following:   Order Comments: BLEEDING PRECAUTIONS: Please report to the Emergency Room or contact your physician if you are saturating 1 thick overnight pad per hour for 2 consecutive hours.    CONSTIPATION REMEDIES: Patients are often constipated after surgery or with use of narcotic pain medicine. Remain adequately hydrated by consuming 8 standard water bottles daily. Take a stool softener (Colace or Sennakot) daily, or Mirilax if you prefer. If you have not had a bowel movement for 3 days after surgery, or are uncomfortable and unable to pass stool, please try one or all of the following measures:  1.  Milk of Magnesia - 30 cc by mouth every 12 hours   2.  Dulcolax  suppository - one suppository per rectum every 4-6 hours   3.  Metamucil, Fibercon or other bulk former - use as directed on packaging  4.  Fleet enema     Activity as tolerated     Shower on day dressing removed (No bath)   Order Comments: You may shower on day of surgery, with assistance if needed. Use mild unscented soap and allow soapy water to run over incisions/bandages. Avoid scrubbing incision sites. Avoid submerging in water (baths, pools, hot tubs) for 4 weeks.    You may remove your bandaids after 24 hours. The white steri-strip bandages should remain covering wounds until they naturally begin to peel off (usually in 1-2 weeks). Once the steri-strips come off, you may keep the incision open to air, unless it is your preference to put bandaids on.        Follow-up Information       Jeniffer Butt MD. Go on 7/17/2025.    Specialties: Obstetrics, Obstetrics and Gynecology  Why: Post-op follow up  Contact information:  [address] [address]  [phone]                           Luzmaria Mckeon MD/MPH  OB/GYN PGY-4  [facility]

## 2025-06-18 NOTE — PLAN OF CARE
Chasity Kwan has met all discharge criteria from Phase II. Vital Signs are stable, ambulating  without difficulty. Discharge instructions given, patient verbalized understanding. Discharged from facility via wheelchair in stable condition.

## 2025-06-19 ENCOUNTER — PATIENT MESSAGE (OUTPATIENT)
Dept: OBSTETRICS AND GYNECOLOGY | Facility: CLINIC | Age: 45
End: 2025-06-19
Payer: COMMERCIAL

## 2025-06-19 VITALS
DIASTOLIC BLOOD PRESSURE: 69 MMHG | RESPIRATION RATE: 16 BRPM | TEMPERATURE: 98 F | OXYGEN SATURATION: 96 % | SYSTOLIC BLOOD PRESSURE: 114 MMHG | HEART RATE: 78 BPM

## 2025-06-20 LAB
ESTROGEN SERPL-MCNC: NORMAL PG/ML
INSULIN SERPL-ACNC: NORMAL U[IU]/ML
LAB AP CLINICAL INFORMATION: NORMAL
LAB AP DIAGNOSIS CATEGORY: NORMAL
LAB AP GROSS DESCRIPTION: NORMAL
LAB AP PERFORMING LOCATION(S): NORMAL
LAB AP REPORT FOOTNOTES: NORMAL

## 2025-06-27 ENCOUNTER — PATIENT MESSAGE (OUTPATIENT)
Dept: OBSTETRICS AND GYNECOLOGY | Facility: CLINIC | Age: 45
End: 2025-06-27
Payer: COMMERCIAL

## 2025-06-30 ENCOUNTER — OFFICE VISIT (OUTPATIENT)
Dept: OBSTETRICS AND GYNECOLOGY | Facility: CLINIC | Age: 45
End: 2025-06-30
Payer: COMMERCIAL

## 2025-06-30 ENCOUNTER — HOSPITAL ENCOUNTER (OUTPATIENT)
Dept: RADIOLOGY | Facility: OTHER | Age: 45
Discharge: HOME | End: 2025-06-30
Attending: OBSTETRICS & GYNECOLOGY
Payer: COMMERCIAL

## 2025-06-30 VITALS
WEIGHT: 150.56 LBS | BODY MASS INDEX: 30.35 KG/M2 | HEIGHT: 59 IN | DIASTOLIC BLOOD PRESSURE: 60 MMHG | SYSTOLIC BLOOD PRESSURE: 90 MMHG

## 2025-06-30 DIAGNOSIS — R06.02 SHORTNESS OF BREATH: ICD-10-CM

## 2025-06-30 DIAGNOSIS — R10.11 RIGHT UPPER QUADRANT PAIN: Primary | ICD-10-CM

## 2025-06-30 DIAGNOSIS — R10.11 RIGHT UPPER QUADRANT PAIN: ICD-10-CM

## 2025-06-30 DIAGNOSIS — R07.81 RIB PAIN: ICD-10-CM

## 2025-06-30 PROCEDURE — 3078F DIAST BP <80 MM HG: CPT | Mod: CPTII,S$GLB,, | Performed by: OBSTETRICS & GYNECOLOGY

## 2025-06-30 PROCEDURE — 3044F HG A1C LEVEL LT 7.0%: CPT | Mod: CPTII,S$GLB,, | Performed by: OBSTETRICS & GYNECOLOGY

## 2025-06-30 PROCEDURE — 99999 PR PBB SHADOW E&M-EST. PATIENT-LVL IV: CPT | Mod: PBBFAC,,, | Performed by: OBSTETRICS & GYNECOLOGY

## 2025-06-30 PROCEDURE — 3074F SYST BP LT 130 MM HG: CPT | Mod: CPTII,S$GLB,, | Performed by: OBSTETRICS & GYNECOLOGY

## 2025-06-30 PROCEDURE — 87086 URINE CULTURE/COLONY COUNT: CPT | Performed by: OBSTETRICS & GYNECOLOGY

## 2025-06-30 PROCEDURE — 1160F RVW MEDS BY RX/DR IN RCRD: CPT | Mod: CPTII,S$GLB,, | Performed by: OBSTETRICS & GYNECOLOGY

## 2025-06-30 PROCEDURE — 99024 POSTOP FOLLOW-UP VISIT: CPT | Mod: S$GLB,,, | Performed by: OBSTETRICS & GYNECOLOGY

## 2025-06-30 PROCEDURE — 4010F ACE/ARB THERAPY RXD/TAKEN: CPT | Mod: CPTII,S$GLB,, | Performed by: OBSTETRICS & GYNECOLOGY

## 2025-06-30 PROCEDURE — 25500020 PHARM REV CODE 255: Performed by: OBSTETRICS & GYNECOLOGY

## 2025-06-30 PROCEDURE — 71260 CT THORAX DX C+: CPT | Mod: 26,,, | Performed by: RADIOLOGY

## 2025-06-30 PROCEDURE — 74177 CT ABD & PELVIS W/CONTRAST: CPT | Mod: TC

## 2025-06-30 PROCEDURE — 1159F MED LIST DOCD IN RCRD: CPT | Mod: CPTII,S$GLB,, | Performed by: OBSTETRICS & GYNECOLOGY

## 2025-06-30 PROCEDURE — A9698 NON-RAD CONTRAST MATERIALNOC: HCPCS | Performed by: OBSTETRICS & GYNECOLOGY

## 2025-06-30 PROCEDURE — 74177 CT ABD & PELVIS W/CONTRAST: CPT | Mod: 26,,, | Performed by: RADIOLOGY

## 2025-06-30 RX ADMIN — IOHEXOL 1000 ML: 9 SOLUTION ORAL at 05:06

## 2025-06-30 RX ADMIN — IOHEXOL 75 ML: 350 INJECTION, SOLUTION INTRAVENOUS at 05:06

## 2025-06-30 NOTE — PROGRESS NOTES
CC: Postop visit    Chasity Kwan is a 45 y.o. female  post-op from a Robotic Assisted Total Laparoscopic Hysterectomy with Bilateral Salpingectomy  on 2025.  Patient is doing well postoperatively.  Having right upper quadrant pain with inhalation.  NO pain until a few days ago.  6/10 pain and NSAIDs do not help the pain  No bowel or bladder complaints.  No fever.    No VB    The pathology revealed:  Robotic Assisted Total Laparoscopic Hysterectomy with Bilateral Salpingectomy     Past Medical History:   Diagnosis Date    Abnormal Pap smear     colpo    Genital herpes in women     H/O: myomectomy     EX lap    History of uterine fibroid     Hyperlipidemia     Hypertension     no longer on medication    Morbid obesity     Pseudotumor cerebri     Sleep apnea     cpap uses irregularly    Wears contact lenses     Wears prescription eyeglasses      Past Surgical History:   Procedure Laterality Date    COLONOSCOPY N/A 2022    Procedure: COLONOSCOPY;  Surgeon: Michelle Denton MD;  Location: [address]);  Service: Endoscopy;  Laterality: N/A;  instr portal-GT  left vm  mal    DV5 ROBOTIC HYSTERECTOMY N/A 2025    Procedure: DV5 ROBOTIC HYSTERECTOMY;  Surgeon: Jeniffer Butt MD;  Location: [facility] OR;  Service: OB/GYN;  Laterality: N/A;    DV5 ROBOTIC SALPINGECTOMY Bilateral 2025    Procedure: DV5 ROBOTIC SALPINGECTOMY;  Surgeon: Jeniffer Butt MD;  Location: [facility] OR;  Service: OB/GYN;  Laterality: Bilateral;    ESOPHAGOGASTRODUODENOSCOPY      GASTRECTOMY      gastric sleeve      INJECTION, SACROILIAC JOINT Left 2025    Procedure: INJECTION,SACROILIAC JOINT LEFT;  Surgeon: Marcos Orourke MD;  Location: [facility] PAIN MGT;  Service: Pain Management;  Laterality: Left;  2 WK F/U DIANE    LAPAROTOMY      SI joint injection       Family History   Problem Relation Name Age of Onset    Hypertension Mother      Hyperlipidemia Mother      Graves' disease Mother   "    Obesity Mother      Hyperlipidemia Sister      Obesity Sister      Colon cancer Father  50    Cataracts Father      Cancer Father          colon, liver, and stomach    Heart disease Maternal Grandfather      Stroke Maternal Grandmother      Diabetes Paternal Grandmother      No Known Problems Paternal Grandfather      Breast cancer Neg Hx      Ovarian cancer Neg Hx       Social History[1]  OB History    Para Term  AB Living   0 0 0 0 0 0   SAB IAB Ectopic Multiple Live Births   0 0 0 0        BP 90/60   Ht 4' 11" (1.499 m)   Wt 68.3 kg (150 lb 9.2 oz)   LMP 2025 (Approximate)   BMI 30.41 kg/m²     ROS:  GENERAL: No fever, chills, fatigability or weight loss.  VULVAR: No pain, no lesions and no itching.  VAGINAL: No relaxation, no itching, no discharge, no abnormal bleeding and no lesions.  ABDOMEN: No abdominal pain. Denies nausea. Denies vomiting. No diarrhea. No constipation  BREAST: Denies pain. No lumps. No discharge.  URINARY: No incontinence, no nocturia, no frequency and no dysuria.  CARDIOVASCULAR: No chest pain. No shortness of breath. No leg cramps.  NEUROLOGICAL: No headaches. No vision changes.    PE:   ABDOMEN:  Soft, non-tender, non-distended.  PELVIC: Normal external genitalia without lesions.  Normal hair distribution.  Adequate perineal body, normal urethral meatus.  Vagina moist and well rugated without lesions or discharge.  The vaginal cuff is intact.   No significant cystocele or rectocele.  Bimanual exam shows uterus to be normal size, regular, mobile and nontender.  Adnexa without masses or tenderness.      Diagnosis        Consider GS, CT for any fluid collections or pathology post operatively?     Follow up if symptoms worsen or fail to improve.  To go to the ER for worsening RUQ pain or SOB/ CP                [1]   Social History  Tobacco Use    Smoking status: Never     Passive exposure: Past    Smokeless tobacco: Never   Vaping Use    Vaping status: Never Used "   Substance Use Topics    Alcohol use: Yes     Comment: occasionally    Drug use: No

## 2025-07-01 ENCOUNTER — PATIENT MESSAGE (OUTPATIENT)
Dept: INTERNAL MEDICINE | Facility: CLINIC | Age: 45
End: 2025-07-01
Payer: COMMERCIAL

## 2025-07-01 LAB — BACTERIA UR CULT: NORMAL

## 2025-07-04 DIAGNOSIS — R74.01 ELEVATED TRANSAMINASE LEVEL: Primary | ICD-10-CM

## 2025-07-08 ENCOUNTER — LAB VISIT (OUTPATIENT)
Dept: LAB | Facility: OTHER | Age: 45
End: 2025-07-08
Attending: INTERNAL MEDICINE
Payer: COMMERCIAL

## 2025-07-08 DIAGNOSIS — R74.01 ELEVATED TRANSAMINASE LEVEL: ICD-10-CM

## 2025-07-08 LAB
ALBUMIN SERPL BCP-MCNC: 3.9 G/DL (ref 3.5–5.2)
ALP SERPL-CCNC: 57 UNIT/L (ref 40–150)
ALT SERPL W/O P-5'-P-CCNC: 69 UNIT/L (ref 10–44)
AST SERPL-CCNC: 56 UNIT/L (ref 11–45)
BILIRUB DIRECT SERPL-MCNC: 0.2 MG/DL (ref 0.1–0.3)
BILIRUB SERPL-MCNC: 0.4 MG/DL (ref 0.1–1)
PROT SERPL-MCNC: 7.8 GM/DL (ref 6–8.4)

## 2025-07-08 PROCEDURE — 36415 COLL VENOUS BLD VENIPUNCTURE: CPT

## 2025-07-08 PROCEDURE — 80076 HEPATIC FUNCTION PANEL: CPT

## 2025-07-09 ENCOUNTER — PATIENT MESSAGE (OUTPATIENT)
Dept: INTERNAL MEDICINE | Facility: CLINIC | Age: 45
End: 2025-07-09
Payer: COMMERCIAL

## 2025-07-14 ENCOUNTER — TELEPHONE (OUTPATIENT)
Dept: INTERNAL MEDICINE | Facility: CLINIC | Age: 45
End: 2025-07-14
Payer: COMMERCIAL

## 2025-07-14 NOTE — TELEPHONE ENCOUNTER
----- Message from Nicolas Dye MD sent at 7/13/2025  8:17 PM CDT -----    Please contact patient.  Her liver enzymes are still elevated.    is out for the next several weeks.  The patient is need to be seen in order to determined with the next step should be.  ----- Message -----  From: Lab, Background User  Sent: 7/8/2025   4:41 PM CDT  To: Nicolas Dye MD

## 2025-07-16 ENCOUNTER — TELEPHONE (OUTPATIENT)
Dept: OBSTETRICS AND GYNECOLOGY | Facility: CLINIC | Age: 45
End: 2025-07-16
Payer: COMMERCIAL

## 2025-07-17 ENCOUNTER — OFFICE VISIT (OUTPATIENT)
Dept: OBSTETRICS AND GYNECOLOGY | Facility: CLINIC | Age: 45
End: 2025-07-17
Payer: COMMERCIAL

## 2025-07-17 VITALS
BODY MASS INDEX: 29.65 KG/M2 | HEIGHT: 59 IN | WEIGHT: 147.06 LBS | DIASTOLIC BLOOD PRESSURE: 54 MMHG | SYSTOLIC BLOOD PRESSURE: 96 MMHG

## 2025-07-17 DIAGNOSIS — Z90.710 S/P HYSTERECTOMY: Primary | ICD-10-CM

## 2025-07-17 PROCEDURE — 3078F DIAST BP <80 MM HG: CPT | Mod: CPTII,S$GLB,, | Performed by: OBSTETRICS & GYNECOLOGY

## 2025-07-17 PROCEDURE — 4010F ACE/ARB THERAPY RXD/TAKEN: CPT | Mod: CPTII,S$GLB,, | Performed by: OBSTETRICS & GYNECOLOGY

## 2025-07-17 PROCEDURE — 99024 POSTOP FOLLOW-UP VISIT: CPT | Mod: S$GLB,,, | Performed by: OBSTETRICS & GYNECOLOGY

## 2025-07-17 PROCEDURE — 3074F SYST BP LT 130 MM HG: CPT | Mod: CPTII,S$GLB,, | Performed by: OBSTETRICS & GYNECOLOGY

## 2025-07-17 PROCEDURE — 99999 PR PBB SHADOW E&M-EST. PATIENT-LVL IV: CPT | Mod: PBBFAC,,, | Performed by: OBSTETRICS & GYNECOLOGY

## 2025-07-17 PROCEDURE — 3044F HG A1C LEVEL LT 7.0%: CPT | Mod: CPTII,S$GLB,, | Performed by: OBSTETRICS & GYNECOLOGY

## 2025-07-18 ENCOUNTER — TELEPHONE (OUTPATIENT)
Dept: OBSTETRICS AND GYNECOLOGY | Facility: CLINIC | Age: 45
End: 2025-07-18
Payer: COMMERCIAL

## 2025-07-18 NOTE — PROGRESS NOTES
"CC: Postoperative visit    Chasity Kwan is a 45 y.o. female  presents for a postoperative visit s/p 1. Robotic Assisted Total Laparoscopic Hysterectomy with Bilateral Salpingectomy  on 2025.  Her postoperative course was uncomplicated.  She is doing well postoperative.    Pathology showed:  Uterus, cervix and bilateral fallopian tubes weighing 149 g showing:   Inactive endometrium  The cervix shows focal acute and chronic endocervicitis  Multiple subserosal, intramural and submucosal leiomyomas are identified measuring up to 3.2 cm  Unremarkable right fallopian tube   Unremarkable left fallopian tube segment             BP (!) 96/54   Ht 4' 11" (1.499 m)   Wt 66.7 kg (147 lb 0.8 oz)   LMP 2025 (Approximate)   BMI 29.70 kg/m²     ROS:  GENERAL: No fever, chills, fatigability or weight loss.  VULVAR: No pain, no lesions and no itching.  VAGINAL: No relaxation, no itching, no discharge, no abnormal bleeding and no lesions.  ABDOMEN: No abdominal pain. Denies nausea. Denies vomiting. No diarrhea. No constipation  BREAST: Denies pain. No lumps. No discharge.  URINARY: No incontinence, no nocturia, no frequency and no dysuria.  CARDIOVASCULAR: No chest pain. No shortness of breath. No leg cramps.  NEUROLOGICAL: No headaches. No vision changes.    Physical Exam  Abdominal incisions  healing well  PELVIC: Normal external genitalia without lesions.  Normal hair distribution.  Adequate perineal body, normal urethral meatus.  Vagina moist and well rugated without lesions or discharge.  Vaginal cuff is healing well, NO  discharge or tenderness.  No significant cystocele or rectocele.  Bimanual exam shows uterus to be normal size, regular, mobile and nontender.  Adnexa without masses or tenderness.      1. S/P hysterectomy          Pelvic Rext X 8 wks  Bleeding and pain precautions  Return to clinic PRN    "

## 2025-07-20 NOTE — PROGRESS NOTES
Past Medical History:   Diagnosis Date    Abnormal Pap smear 2009     colpo    Genital herpes in women      H/O: myomectomy       EX lap    History of uterine fibroid      Hyperlipidemia      Hypertension      Morbid obesity      Pseudotumor cerebri          45-year-old female    Reason for this visit is regarding low blood pressure readings, hypotension.  She was previously on medication losartan 50 mg.  It was discontinued in March the prior to that she started noticing a readings getting below 100.  When she would sit up from a lying position or standing up from a sitting position she will feel lightheaded as if she might want to blackout but has not.  Nevertheless since stopping the medicine that has been no improvement with the blood pressure within the past few readings she has gone as low as systolic 73 to that a systolic of 100.  During which time she has not had any chest pain palpitations shortness of breath.    Another issue is elevated liver enzymes.  She had a laparoscopic hysterectomy salpingectomy 6/18/2025.  This was due to fibroid tumors and hypermenorrhea.  After the surgery she had a bout of right upper quadrant pain had a CT of the abdomen which has been unrevealing but also chemistry test that showed elevated transaminases.  She had repeat liver function tests July 8th and was transaminases were still elevated 5669 respectively.  She has not been with any liver condition    Medications   Rosuvastatin 40 mg for hyperlipidemia  Gabapentin 300 mg q.h.s., which is SI joint disease  Abilify 2 mg   Clonazepam 1 mg q.h.s.  Lexapro 20 mg  Ambien 6.25 mg q.h.s. as needed  Omeprazole 40 mg for reflux   Tirzapetide 15 mg    It is noted that she has had a previous gastric sleeve year 2015 when she was started on his appetite UA and a half ago weight was 219, presently is 149    Examination   Weight 149   BMI 30.28  Pulse 76 sitting, pulse standing 84    Blood pressure sitting 92/60 Standing 84/56  Neck no  thyromegaly   Chest clear breath sounds  Heart regular rate and rhythm  Abdominal exam is bowel sounds soft nontender no hepatosplenomegaly abdominal masses    Impression   Hypotension with orthostatic hypotension  Elevated transaminases  Hyperlipidemia    Plan   Repeat chemistry panel phlegm with CBC free T4  Referral to Cardiology determine if there is evidence for autonomic dysfunction.  BONI profile along with hepatitis-C hepatitis-B  Due to hyperlipidemia and family history cardiovascular disease will also get lipoprotein a.  Determine whether to continue with current the current dose of rosuvastatin.

## 2025-07-21 ENCOUNTER — OFFICE VISIT (OUTPATIENT)
Dept: INTERNAL MEDICINE | Facility: CLINIC | Age: 45
End: 2025-07-21
Payer: COMMERCIAL

## 2025-07-21 ENCOUNTER — LAB VISIT (OUTPATIENT)
Dept: LAB | Facility: HOSPITAL | Age: 45
End: 2025-07-21
Attending: INTERNAL MEDICINE
Payer: COMMERCIAL

## 2025-07-21 VITALS
HEIGHT: 59 IN | OXYGEN SATURATION: 96 % | WEIGHT: 149.94 LBS | SYSTOLIC BLOOD PRESSURE: 98 MMHG | BODY MASS INDEX: 30.23 KG/M2 | DIASTOLIC BLOOD PRESSURE: 66 MMHG | HEART RATE: 88 BPM

## 2025-07-21 DIAGNOSIS — I95.1 ORTHOSTATIC HYPOTENSION: ICD-10-CM

## 2025-07-21 DIAGNOSIS — R79.89 LFT ELEVATION: ICD-10-CM

## 2025-07-21 DIAGNOSIS — E78.5 HYPERLIPIDEMIA, UNSPECIFIED HYPERLIPIDEMIA TYPE: ICD-10-CM

## 2025-07-21 DIAGNOSIS — I95.9 HYPOTENSION, UNSPECIFIED HYPOTENSION TYPE: ICD-10-CM

## 2025-07-21 DIAGNOSIS — I95.9 HYPOTENSION, UNSPECIFIED HYPOTENSION TYPE: Primary | ICD-10-CM

## 2025-07-21 LAB
ABSOLUTE EOSINOPHIL (OHS): 0.08 K/UL
ABSOLUTE MONOCYTE (OHS): 0.66 K/UL (ref 0.3–1)
ABSOLUTE NEUTROPHIL COUNT (OHS): 5.03 K/UL (ref 1.8–7.7)
ALBUMIN SERPL BCP-MCNC: 4.1 G/DL (ref 3.5–5.2)
ALP SERPL-CCNC: 58 UNIT/L (ref 40–150)
ALT SERPL W/O P-5'-P-CCNC: 49 UNIT/L (ref 10–44)
ANION GAP (OHS): 11 MMOL/L (ref 8–16)
AST SERPL-CCNC: 38 UNIT/L (ref 11–45)
BASOPHILS # BLD AUTO: 0.04 K/UL
BASOPHILS NFR BLD AUTO: 0.5 %
BILIRUB SERPL-MCNC: 0.2 MG/DL (ref 0.1–1)
BUN SERPL-MCNC: 22 MG/DL (ref 6–20)
CALCIUM SERPL-MCNC: 8.7 MG/DL (ref 8.7–10.5)
CHLORIDE SERPL-SCNC: 103 MMOL/L (ref 95–110)
CHOLEST SERPL-MCNC: 165 MG/DL (ref 120–199)
CHOLEST/HDLC SERPL: 2.9 {RATIO} (ref 2–5)
CO2 SERPL-SCNC: 26 MMOL/L (ref 23–29)
CREAT SERPL-MCNC: 0.7 MG/DL (ref 0.5–1.4)
ERYTHROCYTE [DISTWIDTH] IN BLOOD BY AUTOMATED COUNT: 13.4 % (ref 11.5–14.5)
GFR SERPLBLD CREATININE-BSD FMLA CKD-EPI: >60 ML/MIN/1.73/M2
GLUCOSE SERPL-MCNC: 77 MG/DL (ref 70–110)
HBV SURFACE AG SERPL QL IA: NORMAL
HCT VFR BLD AUTO: 37.7 % (ref 37–48.5)
HCV AB SERPL QL IA: NORMAL
HDLC SERPL-MCNC: 57 MG/DL (ref 40–75)
HDLC SERPL: 34.5 % (ref 20–50)
HGB BLD-MCNC: 12.4 GM/DL (ref 12–16)
IMM GRANULOCYTES # BLD AUTO: 0.04 K/UL (ref 0–0.04)
IMM GRANULOCYTES NFR BLD AUTO: 0.5 % (ref 0–0.5)
LDLC SERPL CALC-MCNC: 90.6 MG/DL (ref 63–159)
LYMPHOCYTES # BLD AUTO: 1.66 K/UL (ref 1–4.8)
MAGNESIUM SERPL-MCNC: 1.9 MG/DL (ref 1.6–2.6)
MCH RBC QN AUTO: 29.9 PG (ref 27–31)
MCHC RBC AUTO-ENTMCNC: 32.9 G/DL (ref 32–36)
MCV RBC AUTO: 91 FL (ref 82–98)
NONHDLC SERPL-MCNC: 108 MG/DL
NUCLEATED RBC (/100WBC) (OHS): 0 /100 WBC
PLATELET # BLD AUTO: 241 K/UL (ref 150–450)
PMV BLD AUTO: 12.3 FL (ref 9.2–12.9)
POTASSIUM SERPL-SCNC: 3.9 MMOL/L (ref 3.5–5.1)
PROT SERPL-MCNC: 7.5 GM/DL (ref 6–8.4)
RBC # BLD AUTO: 4.15 M/UL (ref 4–5.4)
RELATIVE EOSINOPHIL (OHS): 1.1 %
RELATIVE LYMPHOCYTE (OHS): 22.1 % (ref 18–48)
RELATIVE MONOCYTE (OHS): 8.8 % (ref 4–15)
RELATIVE NEUTROPHIL (OHS): 67 % (ref 38–73)
SODIUM SERPL-SCNC: 140 MMOL/L (ref 136–145)
T4 FREE SERPL-MCNC: 1.14 NG/DL (ref 0.71–1.51)
TRIGL SERPL-MCNC: 87 MG/DL (ref 30–150)
WBC # BLD AUTO: 7.51 K/UL (ref 3.9–12.7)

## 2025-07-21 PROCEDURE — 1159F MED LIST DOCD IN RCRD: CPT | Mod: CPTII,S$GLB,, | Performed by: INTERNAL MEDICINE

## 2025-07-21 PROCEDURE — 3074F SYST BP LT 130 MM HG: CPT | Mod: CPTII,S$GLB,, | Performed by: INTERNAL MEDICINE

## 2025-07-21 PROCEDURE — 85025 COMPLETE CBC W/AUTO DIFF WBC: CPT

## 2025-07-21 PROCEDURE — 3008F BODY MASS INDEX DOCD: CPT | Mod: CPTII,S$GLB,, | Performed by: INTERNAL MEDICINE

## 2025-07-21 PROCEDURE — 80061 LIPID PANEL: CPT

## 2025-07-21 PROCEDURE — 36415 COLL VENOUS BLD VENIPUNCTURE: CPT

## 2025-07-21 PROCEDURE — 1160F RVW MEDS BY RX/DR IN RCRD: CPT | Mod: CPTII,S$GLB,, | Performed by: INTERNAL MEDICINE

## 2025-07-21 PROCEDURE — 4010F ACE/ARB THERAPY RXD/TAKEN: CPT | Mod: CPTII,S$GLB,, | Performed by: INTERNAL MEDICINE

## 2025-07-21 PROCEDURE — 87340 HEPATITIS B SURFACE AG IA: CPT

## 2025-07-21 PROCEDURE — 86803 HEPATITIS C AB TEST: CPT

## 2025-07-21 PROCEDURE — 83695 ASSAY OF LIPOPROTEIN(A): CPT

## 2025-07-21 PROCEDURE — 80053 COMPREHEN METABOLIC PANEL: CPT

## 2025-07-21 PROCEDURE — 99214 OFFICE O/P EST MOD 30 MIN: CPT | Mod: S$GLB,,, | Performed by: INTERNAL MEDICINE

## 2025-07-21 PROCEDURE — 3044F HG A1C LEVEL LT 7.0%: CPT | Mod: CPTII,S$GLB,, | Performed by: INTERNAL MEDICINE

## 2025-07-21 PROCEDURE — 83735 ASSAY OF MAGNESIUM: CPT

## 2025-07-21 PROCEDURE — 99999 PR PBB SHADOW E&M-EST. PATIENT-LVL V: CPT | Mod: PBBFAC,,, | Performed by: INTERNAL MEDICINE

## 2025-07-21 PROCEDURE — 84439 ASSAY OF FREE THYROXINE: CPT

## 2025-07-21 PROCEDURE — 3078F DIAST BP <80 MM HG: CPT | Mod: CPTII,S$GLB,, | Performed by: INTERNAL MEDICINE

## 2025-07-21 PROCEDURE — 86038 ANTINUCLEAR ANTIBODIES: CPT

## 2025-07-21 NOTE — ADDENDUM NOTE
Addended by: JS GUILLORY on: 7/21/2025 03:05 PM     Modules accepted: Orders     Pt was told to just call Dr. Obrien back if he decided he would like some medication for his foot fungus.  He would like us to call in a rx to [facility].

## 2025-07-22 ENCOUNTER — PATIENT MESSAGE (OUTPATIENT)
Dept: INTERNAL MEDICINE | Facility: CLINIC | Age: 45
End: 2025-07-22
Payer: COMMERCIAL

## 2025-07-22 LAB — ANA (OHS): NORMAL

## 2025-07-24 LAB — W LP(A): 371 NMOL/L

## 2025-07-26 ENCOUNTER — PATIENT MESSAGE (OUTPATIENT)
Dept: INTERNAL MEDICINE | Facility: CLINIC | Age: 45
End: 2025-07-26
Payer: COMMERCIAL

## 2025-07-26 DIAGNOSIS — Z13.6 ENCOUNTER FOR SCREENING FOR CARDIOVASCULAR DISORDERS: Primary | ICD-10-CM

## 2025-07-26 NOTE — PROGRESS NOTES
Internal medicine note.   my chart message sent regarding lipoprotein a level elevated at 319    Certainly to stay on rosuvastatin 40 mg.  Do not feel this is a factor with the abnormal liver tests which is actually improved    Consider calcium score test to evaluate for calcification along the coronary vessels

## 2025-07-29 ENCOUNTER — PATIENT MESSAGE (OUTPATIENT)
Dept: PAIN MEDICINE | Facility: OTHER | Age: 45
End: 2025-07-29
Payer: COMMERCIAL

## 2025-07-30 ENCOUNTER — PATIENT MESSAGE (OUTPATIENT)
Dept: PAIN MEDICINE | Facility: OTHER | Age: 45
End: 2025-07-30
Payer: COMMERCIAL

## 2025-07-31 ENCOUNTER — HOSPITAL ENCOUNTER (OUTPATIENT)
Dept: RADIOLOGY | Facility: HOSPITAL | Age: 45
Discharge: HOME | End: 2025-07-31
Payer: COMMERCIAL

## 2025-07-31 ENCOUNTER — OFFICE VISIT (OUTPATIENT)
Dept: ORTHOPEDICS | Facility: CLINIC | Age: 45
End: 2025-07-31
Payer: COMMERCIAL

## 2025-07-31 VITALS — BODY MASS INDEX: 30.23 KG/M2 | HEIGHT: 59 IN | WEIGHT: 149.94 LBS

## 2025-07-31 DIAGNOSIS — M25.512 ACUTE PAIN OF LEFT SHOULDER: ICD-10-CM

## 2025-07-31 DIAGNOSIS — M75.82 ROTATOR CUFF TENDONITIS, LEFT: Primary | ICD-10-CM

## 2025-07-31 PROCEDURE — 73030 X-RAY EXAM OF SHOULDER: CPT | Mod: TC,LT

## 2025-07-31 PROCEDURE — 73030 X-RAY EXAM OF SHOULDER: CPT | Mod: 26,LT,, | Performed by: RADIOLOGY

## 2025-07-31 PROCEDURE — 99999 PR PBB SHADOW E&M-EST. PATIENT-LVL IV: CPT | Mod: PBBFAC,,,

## 2025-07-31 RX ADMIN — TRIAMCINOLONE ACETONIDE 40 MG: 40 INJECTION, SUSPENSION INTRA-ARTICULAR; INTRAMUSCULAR at 05:07

## 2025-08-01 ENCOUNTER — HOSPITAL ENCOUNTER (OUTPATIENT)
Facility: OTHER | Age: 45
Discharge: HOME | End: 2025-08-01
Attending: ANESTHESIOLOGY | Admitting: ANESTHESIOLOGY
Payer: COMMERCIAL

## 2025-08-01 VITALS
WEIGHT: 150 LBS | HEART RATE: 83 BPM | BODY MASS INDEX: 30.24 KG/M2 | SYSTOLIC BLOOD PRESSURE: 124 MMHG | TEMPERATURE: 99 F | OXYGEN SATURATION: 99 % | HEIGHT: 59 IN | RESPIRATION RATE: 18 BRPM | DIASTOLIC BLOOD PRESSURE: 76 MMHG

## 2025-08-01 DIAGNOSIS — M46.1 SACROILIITIS: Primary | ICD-10-CM

## 2025-08-01 DIAGNOSIS — G89.29 CHRONIC PAIN: ICD-10-CM

## 2025-08-01 PROCEDURE — 27096 INJECT SACROILIAC JOINT: CPT | Mod: RT,,, | Performed by: ANESTHESIOLOGY

## 2025-08-01 PROCEDURE — 25500020 PHARM REV CODE 255: Performed by: ANESTHESIOLOGY

## 2025-08-01 PROCEDURE — 27096 INJECT SACROILIAC JOINT: CPT | Mod: RT | Performed by: ANESTHESIOLOGY

## 2025-08-01 PROCEDURE — 63600175 PHARM REV CODE 636 W HCPCS: Performed by: ANESTHESIOLOGY

## 2025-08-01 RX ORDER — TRIAMCINOLONE ACETONIDE 40 MG/ML
40 INJECTION, SUSPENSION INTRA-ARTICULAR; INTRAMUSCULAR ONCE
Status: COMPLETED | OUTPATIENT
Start: 2025-08-01 | End: 2025-08-01

## 2025-08-01 RX ORDER — MIDAZOLAM HYDROCHLORIDE 1 MG/ML
INJECTION INTRAMUSCULAR; INTRAVENOUS
Status: DISCONTINUED | OUTPATIENT
Start: 2025-08-01 | End: 2025-08-01 | Stop reason: HOSPADM

## 2025-08-01 RX ORDER — TRIAMCINOLONE ACETONIDE 40 MG/ML
INJECTION, SUSPENSION INTRA-ARTICULAR; INTRAMUSCULAR
Status: DISCONTINUED | OUTPATIENT
Start: 2025-08-01 | End: 2025-08-01 | Stop reason: HOSPADM

## 2025-08-01 RX ORDER — BUPIVACAINE HYDROCHLORIDE 2.5 MG/ML
INJECTION, SOLUTION EPIDURAL; INFILTRATION; INTRACAUDAL; PERINEURAL
Status: DISCONTINUED | OUTPATIENT
Start: 2025-08-01 | End: 2025-08-01 | Stop reason: HOSPADM

## 2025-08-01 RX ORDER — SODIUM CHLORIDE 9 MG/ML
INJECTION, SOLUTION INTRAVENOUS CONTINUOUS
Status: DISCONTINUED | OUTPATIENT
Start: 2025-08-01 | End: 2025-08-01 | Stop reason: HOSPADM

## 2025-08-01 RX ORDER — LIDOCAINE HYDROCHLORIDE 20 MG/ML
INJECTION, SOLUTION INFILTRATION; PERINEURAL
Status: DISCONTINUED | OUTPATIENT
Start: 2025-08-01 | End: 2025-08-01 | Stop reason: HOSPADM

## 2025-08-01 RX ADMIN — TRIAMCINOLONE ACETONIDE 40 MG: 40 INJECTION, SUSPENSION INTRA-ARTICULAR; INTRAMUSCULAR at 08:08

## 2025-08-01 NOTE — OP NOTE
Sacroiliac Joint Injection under Fluoroscopic Guidance    The procedure, risks, benefits, and options were discussed with the patient. There are no contraindications to the procedure. The patent expressed understanding and agreed to the procedure. Informed written consent was obtained prior to the start of the procedure and can be found in the patient's chart.    PATIENT NAME: Chasity Kwan   MRN: [id number]     DATE OF PROCEDURE: 08/01/2025    PROCEDURE: Right Sacroiliac Joint Injection under Fluoroscopic Guidance    PRE-OP DIAGNOSIS: Sacroiliac joint pain [M53.3]    POST-OP DIAGNOSIS: Same    PHYSICIAN: Marcos Orourke MD    ASSISTANTS: DO Laureano Cardenas MD Jennifer Klopfenstein, MD     MEDICATIONS INJECTED: Preservative-free Kenalog 40mg with 3cc of Bupivacine 0.25%     LOCAL ANESTHETIC INJECTED: Xylocaine 2%     SEDATION: Versed 2mg and Fentanyl 0mcg                                                                                                                                                                                     Conscious sedation ordered by [email or web address]. Patient re-evaluation prior to administration of conscious sedation. No changes noted in patient's status from initial evaluation. The patient's vital signs were monitored by RN and patient remained hemodynamically stable throughout the procedure.    Event Time In   Sedation Start 1345   Sedation End 1352       ESTIMATED BLOOD LOSS: None    COMPLICATIONS: None    TECHNIQUE: Time-out was performed to identify the patient and procedure to be performed. With the patient laying in a prone position, the surgical area was prepped and draped in the usual sterile fashion using ChloraPrep and a fenestrated drape. The sacroiliac joint was determined under fluoroscopy guidance. Skin anesthesia was achieved by injecting Lidocaine 2% over the injection site. The sacroiliac joint was  then approached with a 25 gauge, 3.5 inch spinal quinke needle that  was introduced under fluoroscopic guidance in the AP and Lateral views. Once the needle tip was in the area of the joint, and there was no blood, contrast dye Omnipaque (300mg/mL) was injected to confirm placement and there was no vascular runoff. Fluoroscopic imaging in the AP and lateral views revealed a clear outline of the joint space. 4 mL of the medication mixture listed above was injected slowly intraarticular and tien-articular. Displacement of the radio opaque contrast after injection of the medication confirmed that the medication went into the area of the joint. The needles were removed and bleeding was nil. A sterile dressing was applied. No specimens collected. The patient tolerated the procedure well.     The patient was monitored after the procedure in the recovery area. They were given post-procedure and discharge instructions to follow at home. The patient was discharged in a stable condition.    Laureano Mcrae MD     I reviewed and edited the fellow's note. I conducted my own interview and physical examination. I agree with the findings. I was present and supervising all critical portions of the procedure.    Marcos Orourke MD

## 2025-08-01 NOTE — H&P
"HPI  Patient presenting for Procedure(s) (LRB):  INJECTION,SACROILIAC JOINT RIGHT (Right)     Patient on Anti-coagulation No    No health changes since previous encounter    Past Medical History:   Diagnosis Date    Abnormal Pap smear 2009    colpo    Genital herpes in women     H/O: myomectomy     EX lap    History of uterine fibroid     Hyperlipidemia     Hypertension     no longer on medication    Morbid obesity     Pseudotumor cerebri     Sleep apnea     cpap uses irregularly    Wears contact lenses     Wears prescription eyeglasses      Past Surgical History:   Procedure Laterality Date    COLONOSCOPY N/A 12/12/2022    Procedure: COLONOSCOPY;  Surgeon: Michelle Denton MD;  Location: [facility] ENDO (4TH FLR);  Service: Endoscopy;  Laterality: N/A;  instr portal-GT  left vm 12/5 mal    DV5 ROBOTIC HYSTERECTOMY N/A 6/18/2025    Procedure: DV5 ROBOTIC HYSTERECTOMY;  Surgeon: Jeniffer Butt MD;  Location: [facility] OR;  Service: OB/GYN;  Laterality: N/A;    DV5 ROBOTIC SALPINGECTOMY Bilateral 6/18/2025    Procedure: DV5 ROBOTIC SALPINGECTOMY;  Surgeon: Jeniffer Butt MD;  Location: [facility] OR;  Service: OB/GYN;  Laterality: Bilateral;    ESOPHAGOGASTRODUODENOSCOPY      GASTRECTOMY      gastric sleeve  2015    INJECTION, SACROILIAC JOINT Left 5/14/2025    Procedure: INJECTION,SACROILIAC JOINT LEFT;  Surgeon: Marcos Orourke MD;  Location: [facility] PAIN MGT;  Service: Pain Management;  Laterality: Left;  2 WK F/U DIANE    LAPAROTOMY      SI joint injection       Review of patient's allergies indicates:  No Known Allergies   Current Facility-Administered Medications   Medication    0.9% NaCl infusion       PMHx, PSHx, Allergies, Medications reviewed in epic    ROS negative except pain complaints in HPI    OBJECTIVE:    /69   Pulse 82   Temp 98.8 °F (37.1 °C) (Oral)   Resp 16   Ht 4' 11" (1.499 m)   Wt 68 kg (150 lb)   LMP 08/01/2024 (Approximate)   SpO2 98%   Breastfeeding No   BMI 30.30 kg/m²     PHYSICAL " EXAMINATION:    GENERAL: Well appearing, in no acute distress, alert and oriented x3.  PSYCH:  Mood and affect appropriate.  SKIN: Skin color, texture, turgor normal, no rashes or lesions which will impact the procedure.  CV: RRR with palpation of the radial artery.  PULM: No evidence of respiratory difficulty, symmetric chest rise. Clear to auscultation.  NEURO: Cranial nerves grossly intact.    Plan:    Proceed with procedure as planned Procedure(s) (LRB):  INJECTION,SACROILIAC JOINT RIGHT (Right)    Sacha Aaron  08/01/2025

## 2025-08-01 NOTE — PROCEDURES
Large Joint Aspiration/Injection: L subacromial bursa    Date/Time: 7/31/2025 5:20 PM    Performed by: Seamus Delgado PA-C  Authorized by: Seamus Delgado PA-C    Timeout: prior to procedure the correct patient, procedure, and site was verified    Prep: patient was prepped and draped in usual sterile fashion      Local anesthesia used?: Yes    Local anesthetic:  Topical anesthetic    Details:  Needle Size:  22 G  Approach:  Posterior  Location:  Shoulder  Site:  L subacromial bursa  Medications:  40 mg Triamcinolone 40 mg unilateral  Patient tolerance:  Patient tolerated the procedure well with no immediate complications

## 2025-08-01 NOTE — DISCHARGE SUMMARY
Discharge Note  Short Stay      SUMMARY     Admit Date: 8/1/2025    Attending Physician: Marcos Orourke MD    Discharge Physician: Laureano Mcrae MD      Discharge Date: 8/1/2025 1:46 PM    Procedure(s) (LRB):  INJECTION,SACROILIAC JOINT RIGHT (Right)    Final Diagnosis: Sacroiliac joint pain [M53.3]    Disposition: Home or self care    Patient Instructions:   Current Discharge Medication List        CONTINUE these medications which have NOT CHANGED    Details   acetaminophen (TYLENOL) 650 MG TbSR Take 1 tablet (650 mg total) by mouth every 6 (six) hours as needed.  Qty: 30 tablet, Refills: 0      ARIPiprazole (ABILIFY) 2 MG Tab Take 2 mg by mouth.      clonazePAM (KLONOPIN) 1 MG tablet Take 1 mg by mouth 2 (two) times daily as needed.      docusate sodium (COLACE) 100 MG capsule Take 1 capsule (100 mg total) by mouth 2 (two) times daily.  Qty: 60 capsule, Refills: 0      ergocalciferol, vitamin D2, (VITAMIN D ORAL) Take by mouth.      EScitalopram oxalate (LEXAPRO) 20 MG tablet       gabapentin (NEURONTIN) 300 MG capsule Take 1 capsule (300 mg total) by mouth every evening.  Qty: 30 capsule, Refills: 1    Associated Diagnoses: Sacroiliac pain      ibuprofen (ADVIL,MOTRIN) 600 MG tablet Take 1 tablet (600 mg total) by mouth every 6 (six) hours as needed for Pain.  Qty: 30 tablet, Refills: 0      multivitamin (THERAGRAN) per tablet Take 1 tablet by mouth once daily. Patient to stop[ 1 week before surgery starting 3/18/15      omeprazole (PRILOSEC) 40 MG capsule Take 1 capsule (40 mg total) by mouth every morning. Open capsule and take contents by mouth  Qty: 30 capsule, Refills: 11    Associated Diagnoses: GERD (gastroesophageal reflux disease)      oxyCODONE (ROXICODONE) 5 MG immediate release tablet Take 1 tablet (5 mg total) by mouth every 6 (six) hours as needed for Pain.  Qty: 20 tablet, Refills: 0    Associated Diagnoses: History of robot-assisted laparoscopic hysterectomy      rosuvastatin (CRESTOR) 40 MG  Tab TAKE 1 TABLET BY MOUTH ONCE DAILY  Qty: 90 tablet, Refills: 3    Associated Diagnoses: Hyperlipidemia, unspecified hyperlipidemia type      sumatriptan (IMITREX) 50 MG tablet Once for severe headache. May repeat once after 2 hours. Do not exceed 3-4 doses in one week. NO FURTHER REFILL WITHOUT APT  Qty: 12 tablet, Refills: 0    Associated Diagnoses: Other migraine without status migrainosus, not intractable      TIRZEPATIDE SUBQ INJECT 100 UNITS (16.7 MG) SUBCUTANEOUSLY ONCE WEEKLY      valACYclovir (VALTREX) 500 MG tablet TAKE 1 TABLET EVERY DAY AS NEEDED  Qty: 30 tablet, Refills: 11    Associated Diagnoses: Herpes      zolpidem (AMBIEN CR) 6.25 MG CR tablet Take 1 tablet by mouth every evening.   Refills: 2                 Discharge Diagnosis: Sacroiliac joint pain [M53.3]  Condition on Discharge: Stable with no complications to procedure   Diet on Discharge: Same as before.  Activity: as per instruction sheet.  Discharge to: Home with a responsible adult.  Follow up: 2-4 weeks       Please call my office or pager at [phone] if experienced any weakness or loss of sensation, fever > 101.5, pain uncontrolled with oral medications, persistent nausea/vomiting/or diarrhea, redness or drainage from the incisions, or any other worrisome concerns. If physician on call was not reached or could not communicate with our office for any reason please go to the nearest emergency department      Laureano Mcrae M.D.  PGY-5  Interventional Pain Management Fellow  [facility]  Pager: [phone]    08/01/2025

## 2025-08-01 NOTE — PROGRESS NOTES
SUBJECTIVE:     History of Present Illness    CHIEF COMPLAINT:  - Left shoulder pain    HPI:  Ms. Kwan presents with left shoulder pain that has been present for several years, gradually worsening over time. She describes it as a dull ache and burning sensation localized to the shoulder area without radiation down the arm. Pain is exacerbated by sleeping on the affected side, raising the arm, and movement in any direction. She reports difficulty with dressing and sleep disturbances due to pain.    She underwent PT in the past but does not recall its effectiveness. She denies any recent injuries to the shoulder. Currently, she is taking Gabapentin for back pain but is not using any pain medication specifically for the shoulder due to concerns about elevated liver enzymes following a hysterectomy in June.    She denies numbness, tingling, or weakness not related to pain.    PREVIOUS TREATMENTS:  - Outpatient PT: Few years ago    MEDICATIONS:  - Gabapentin: For back pain           Past Medical History:   Diagnosis Date    Abnormal Pap smear 2009    colpo    Genital herpes in women     H/O: myomectomy     EX lap    History of uterine fibroid     Hyperlipidemia     Hypertension     no longer on medication    Morbid obesity     Pseudotumor cerebri     Sleep apnea     cpap uses irregularly    Wears contact lenses     Wears prescription eyeglasses        Past Surgical History:   Procedure Laterality Date    COLONOSCOPY N/A 12/12/2022    Procedure: COLONOSCOPY;  Surgeon: Michelle Denton MD;  Location: [facility] ([address] [place] [address]);  Service: Endoscopy;  Laterality: N/A;  instr portal-GT  left vm 12/5 mal    DV5 ROBOTIC HYSTERECTOMY N/A 6/18/2025    Procedure: DV5 ROBOTIC HYSTERECTOMY;  Surgeon: Jeniffer Butt MD;  Location: [facility] OR;  Service: OB/GYN;  Laterality: N/A;    DV5 ROBOTIC SALPINGECTOMY Bilateral 6/18/2025    Procedure: DV5 ROBOTIC SALPINGECTOMY;  Surgeon: Jeniffer Butt MD;  Location: [facility] OR;  Service:  "OB/GYN;  Laterality: Bilateral;    ESOPHAGOGASTRODUODENOSCOPY      GASTRECTOMY      gastric sleeve  2015    INJECTION, SACROILIAC JOINT Left 5/14/2025    Procedure: INJECTION,SACROILIAC JOINT LEFT;  Surgeon: Marcos Orourke MD;  Location: [facility];  Service: Pain Management;  Laterality: Left;  2 WK F/U DIANE    LAPAROTOMY      SI joint injection         Family History   Problem Relation Name Age of Onset    Hypertension Mother      Hyperlipidemia Mother      Graves' disease Mother      Obesity Mother      Hyperlipidemia Sister      Obesity Sister      Colon cancer Father  50    Cataracts Father      Cancer Father          colon, liver, and stomach    Heart disease Maternal Grandfather      Stroke Maternal Grandmother      Diabetes Paternal Grandmother      No Known Problems Paternal Grandfather      Breast cancer Neg Hx      Ovarian cancer Neg Hx         Review of patient's allergies indicates:  No Known Allergies      Current Medications[1]      Review of Systems:  ROS:  The updated medical history is in the chart and has been reviewed. A review of systems is updated and there is no reported vision changes, ear/nose/mouth/throat complaints, chest pain, shortness of breath, abdominal pain, urological complaints, fevers or chills, psychiatric complaints. Musculoskeletal and neurologcial symptoms are as documented. All other systems are negative.      OBJECTIVE:     PHYSICAL EXAM:  Ht 4' 11" (1.499 m)   Wt 68 kg (149 lb 14.6 oz)   LMP 06/03/2025 (Approximate)   BMI 30.28 kg/m²   General: Pleasant, cooperative, NAD.  HEENT: NCAT, sclera nonicteric.  Lungs: Respirations are equal and unlabored.   Abdomen: Soft and non-tender.  CV: 2+ bilateral upper and lower extremity pulses.  Neuro: Sensation intact to light touch.  Skin: Intact throughout UE with no rashes, erythema, or lesions.  Extremities: No UE edema, NVI upper extremities. normal gait.    left Shoulder exam:   Tenderness: none  ROM: forward flexion " 90/180, extension 35/45, full abduction 190/180, abduction-glenohumeral 80/90, external rotation 40/50  Shoulder Strength: biceps 5/5, triceps 5/5, abduction 5/5, adduction 5/5, external rotation 5/5 with shoulder at side, flexion 5/5, and extension 5/5  sensory exam normal and radial pulse intact  Stability tests: normal  Special Tests: Positive Odell Test, Empty can test, and belly test  Negative Neer Impingement, Bicep/yergason's Test, Speed's Test, and Lincoln test      RADIOGRAPHS:  X-rays of the left shoulder taken today personally reviewed. Imaging reveals calcification associated with possible calcific tendonitis with no acute fractures or dislocations.      ASSESSMENT:       ICD-10-CM ICD-9-CM   1. Rotator cuff tendonitis, left  M75.82 726.10       PLAN:     We discussed with the patient at length all the different treatment options available including anti-inflammatories, acetaminophen, rest, ice, physical therapy to include strengthening exercise, occasional cortisone injections for temporary relief, and surgical intervention.       PROCEDURES:  - Left shoulder subacromial bursa steroid injection administered today.    FOLLOW UP:  - Follow up in 4-6 weeks if no improvement with exercises and injection.    PATIENT INSTRUCTIONS:  - Patient denied outpatient PT and elected to his instead receive shoulder home exercise program.  - A total of 15 minutes was spent instructing and demonstrating the home exercise program with the patient. she demonstrated understanding of the home exercise program.  - Perform home exercises as instructed in provided shoulder self-program.         This note was generated with the assistance of ambient listening technology. Verbal consent was obtained by the patient and accompanying visitor(s) for the recording of patient appointment to facilitate this note. I attest to having reviewed and edited the generated note for accuracy, though some syntax or spelling errors may persist.  Please contact the author of this note for any clarification.      Seamus Delgado Jr., PA-C           [1]   Current Outpatient Medications:     acetaminophen (TYLENOL) 650 MG TbSR, Take 1 tablet (650 mg total) by mouth every 6 (six) hours as needed. (Patient not taking: Reported on 7/21/2025), Disp: 30 tablet, Rfl: 0    ARIPiprazole (ABILIFY) 2 MG Tab, Take 2 mg by mouth., Disp: , Rfl:     clonazePAM (KLONOPIN) 1 MG tablet, Take 1 mg by mouth 2 (two) times daily as needed., Disp: , Rfl:     docusate sodium (COLACE) 100 MG capsule, Take 1 capsule (100 mg total) by mouth 2 (two) times daily. (Patient not taking: Reported on 7/21/2025), Disp: 60 capsule, Rfl: 0    ergocalciferol, vitamin D2, (VITAMIN D ORAL), Take by mouth., Disp: , Rfl:     EScitalopram oxalate (LEXAPRO) 20 MG tablet, , Disp: , Rfl:     gabapentin (NEURONTIN) 300 MG capsule, Take 1 capsule (300 mg total) by mouth every evening., Disp: 30 capsule, Rfl: 1    ibuprofen (ADVIL,MOTRIN) 600 MG tablet, Take 1 tablet (600 mg total) by mouth every 6 (six) hours as needed for Pain. (Patient not taking: Reported on 7/21/2025), Disp: 30 tablet, Rfl: 0    multivitamin (THERAGRAN) per tablet, Take 1 tablet by mouth once daily. Patient to stop[ 1 week before surgery starting 3/18/15, Disp: , Rfl:     omeprazole (PRILOSEC) 40 MG capsule, Take 1 capsule (40 mg total) by mouth every morning. Open capsule and take contents by mouth, Disp: 30 capsule, Rfl: 11    oxyCODONE (ROXICODONE) 5 MG immediate release tablet, Take 1 tablet (5 mg total) by mouth every 6 (six) hours as needed for Pain. (Patient not taking: Reported on 7/21/2025), Disp: 20 tablet, Rfl: 0    rosuvastatin (CRESTOR) 40 MG Tab, TAKE 1 TABLET BY MOUTH ONCE DAILY (Patient taking differently: Take 40 mg by mouth once daily.), Disp: 90 tablet, Rfl: 3    sumatriptan (IMITREX) 50 MG tablet, Once for severe headache. May repeat once after 2 hours. Do not exceed 3-4 doses in one week. NO FURTHER REFILL WITHOUT  APT, Disp: 12 tablet, Rfl: 0    TIRZEPATIDE SUBQ, INJECT 100 UNITS (16.7 MG) SUBCUTANEOUSLY ONCE WEEKLY (Patient not taking: Reported on 7/21/2025), Disp: , Rfl:     valACYclovir (VALTREX) 500 MG tablet, TAKE 1 TABLET EVERY DAY AS NEEDED, Disp: 30 tablet, Rfl: 11    zolpidem (AMBIEN CR) 6.25 MG CR tablet, Take 1 tablet by mouth every evening. , Disp: , Rfl: 2

## 2025-08-01 NOTE — DISCHARGE INSTRUCTIONS

## 2025-08-06 ENCOUNTER — TELEPHONE (OUTPATIENT)
Dept: INTERNAL MEDICINE | Facility: CLINIC | Age: 45
End: 2025-08-06
Payer: COMMERCIAL

## 2025-08-06 NOTE — TELEPHONE ENCOUNTER
Copied from CRM #[id number]. Topic: General Inquiry - Status Check  >> Aug 6, 2025 12:49 PM Mini wrote:  Type: Status Check    Who Called:  Pt is requesting a call back today from the nurse to find out if she still needs to keep the appt on 8/11/25 for the CT Scan due to receiving notification from her insurance that the CT Scan is not approved. Pt also wants to know if the doctor's office is appealing the outcome.  Would the patient rather a call back or a response via [facility]? Call Back  Best Call Back Number:  [phone]

## 2025-08-06 NOTE — TELEPHONE ENCOUNTER
Pt's insurance company denied CT scan, and she would like to know if you want her to keep the appointment.

## 2025-08-07 NOTE — TELEPHONE ENCOUNTER
We can see if the calcium score CT can be appealed authorize, but if not do can cancel the CT, but she has an appointment Cardiology there was discuss a episodes of low blood pressure but can discussed with the cardiologist if calcium score CT is warranted and if they can be of help

## 2025-08-12 ENCOUNTER — OFFICE VISIT (OUTPATIENT)
Dept: CARDIOLOGY | Facility: CLINIC | Age: 45
End: 2025-08-12
Payer: COMMERCIAL

## 2025-08-12 VITALS
HEART RATE: 79 BPM | SYSTOLIC BLOOD PRESSURE: 102 MMHG | HEIGHT: 59 IN | BODY MASS INDEX: 30.18 KG/M2 | OXYGEN SATURATION: 100 % | DIASTOLIC BLOOD PRESSURE: 62 MMHG | WEIGHT: 149.69 LBS

## 2025-08-12 DIAGNOSIS — E78.41 ELEVATED LP(A): ICD-10-CM

## 2025-08-12 DIAGNOSIS — M53.3 SACROILIAC PAIN: ICD-10-CM

## 2025-08-12 DIAGNOSIS — I95.1 ORTHOSTATIC HYPOTENSION: Primary | ICD-10-CM

## 2025-08-12 PROCEDURE — 4010F ACE/ARB THERAPY RXD/TAKEN: CPT | Mod: CPTII,S$GLB,, | Performed by: INTERNAL MEDICINE

## 2025-08-12 PROCEDURE — 93000 ELECTROCARDIOGRAM COMPLETE: CPT | Mod: S$GLB,,, | Performed by: INTERNAL MEDICINE

## 2025-08-12 PROCEDURE — 1159F MED LIST DOCD IN RCRD: CPT | Mod: CPTII,S$GLB,, | Performed by: INTERNAL MEDICINE

## 2025-08-12 PROCEDURE — 1160F RVW MEDS BY RX/DR IN RCRD: CPT | Mod: CPTII,S$GLB,, | Performed by: INTERNAL MEDICINE

## 2025-08-12 PROCEDURE — 99214 OFFICE O/P EST MOD 30 MIN: CPT | Mod: S$GLB,,, | Performed by: INTERNAL MEDICINE

## 2025-08-12 PROCEDURE — 3044F HG A1C LEVEL LT 7.0%: CPT | Mod: CPTII,S$GLB,, | Performed by: INTERNAL MEDICINE

## 2025-08-12 PROCEDURE — 3078F DIAST BP <80 MM HG: CPT | Mod: CPTII,S$GLB,, | Performed by: INTERNAL MEDICINE

## 2025-08-12 PROCEDURE — 3074F SYST BP LT 130 MM HG: CPT | Mod: CPTII,S$GLB,, | Performed by: INTERNAL MEDICINE

## 2025-08-12 PROCEDURE — 99999 PR PBB SHADOW E&M-EST. PATIENT-LVL IV: CPT | Mod: PBBFAC,,, | Performed by: INTERNAL MEDICINE

## 2025-08-12 PROCEDURE — 3008F BODY MASS INDEX DOCD: CPT | Mod: CPTII,S$GLB,, | Performed by: INTERNAL MEDICINE

## 2025-08-12 RX ORDER — GABAPENTIN 300 MG/1
300 CAPSULE ORAL NIGHTLY
Qty: 30 CAPSULE | Refills: 1 | Status: SHIPPED | OUTPATIENT
Start: 2025-08-12

## 2025-08-13 ENCOUNTER — OFFICE VISIT (OUTPATIENT)
Dept: PAIN MEDICINE | Facility: CLINIC | Age: 45
End: 2025-08-13
Payer: COMMERCIAL

## 2025-08-13 VITALS
TEMPERATURE: 99 F | BODY MASS INDEX: 30.14 KG/M2 | SYSTOLIC BLOOD PRESSURE: 104 MMHG | HEIGHT: 59 IN | HEART RATE: 81 BPM | OXYGEN SATURATION: 97 % | WEIGHT: 149.5 LBS | DIASTOLIC BLOOD PRESSURE: 72 MMHG

## 2025-08-13 DIAGNOSIS — M54.9 DORSALGIA, UNSPECIFIED: ICD-10-CM

## 2025-08-13 DIAGNOSIS — M47.816 LUMBAR SPONDYLOSIS: ICD-10-CM

## 2025-08-13 DIAGNOSIS — M53.3 SACROILIAC JOINT PAIN: Primary | ICD-10-CM

## 2025-08-13 LAB
OHS QRS DURATION: 68 MS
OHS QTC CALCULATION: 408 MS

## 2025-08-13 PROCEDURE — 3008F BODY MASS INDEX DOCD: CPT | Mod: CPTII,S$GLB,, | Performed by: NURSE PRACTITIONER

## 2025-08-13 PROCEDURE — 99999 PR PBB SHADOW E&M-EST. PATIENT-LVL IV: CPT | Mod: PBBFAC,,, | Performed by: NURSE PRACTITIONER

## 2025-08-13 PROCEDURE — 99214 OFFICE O/P EST MOD 30 MIN: CPT | Mod: S$GLB,,, | Performed by: NURSE PRACTITIONER

## 2025-08-13 PROCEDURE — 4010F ACE/ARB THERAPY RXD/TAKEN: CPT | Mod: CPTII,S$GLB,, | Performed by: NURSE PRACTITIONER

## 2025-08-13 PROCEDURE — 3044F HG A1C LEVEL LT 7.0%: CPT | Mod: CPTII,S$GLB,, | Performed by: NURSE PRACTITIONER

## 2025-08-13 PROCEDURE — 3078F DIAST BP <80 MM HG: CPT | Mod: CPTII,S$GLB,, | Performed by: NURSE PRACTITIONER

## 2025-08-13 PROCEDURE — 3074F SYST BP LT 130 MM HG: CPT | Mod: CPTII,S$GLB,, | Performed by: NURSE PRACTITIONER

## 2025-08-13 PROCEDURE — 1160F RVW MEDS BY RX/DR IN RCRD: CPT | Mod: CPTII,S$GLB,, | Performed by: NURSE PRACTITIONER

## 2025-08-13 PROCEDURE — 1159F MED LIST DOCD IN RCRD: CPT | Mod: CPTII,S$GLB,, | Performed by: NURSE PRACTITIONER

## 2025-08-18 ENCOUNTER — HOSPITAL ENCOUNTER (OUTPATIENT)
Dept: RADIOLOGY | Facility: OTHER | Age: 45
Discharge: HOME | End: 2025-08-18
Attending: NURSE PRACTITIONER
Payer: COMMERCIAL

## 2025-08-18 DIAGNOSIS — M54.9 DORSALGIA, UNSPECIFIED: ICD-10-CM

## 2025-08-18 PROCEDURE — 72148 MRI LUMBAR SPINE W/O DYE: CPT | Mod: TC

## 2025-08-18 PROCEDURE — 72148 MRI LUMBAR SPINE W/O DYE: CPT | Mod: 26,,, | Performed by: RADIOLOGY

## 2025-08-20 ENCOUNTER — PATIENT MESSAGE (OUTPATIENT)
Dept: PAIN MEDICINE | Facility: CLINIC | Age: 45
End: 2025-08-20
Payer: COMMERCIAL

## 2025-08-20 ENCOUNTER — HOSPITAL ENCOUNTER (OUTPATIENT)
Dept: CARDIOLOGY | Facility: HOSPITAL | Age: 45
Discharge: HOME | End: 2025-08-20
Attending: INTERNAL MEDICINE
Payer: COMMERCIAL

## 2025-08-20 VITALS
BODY MASS INDEX: 34.18 KG/M2 | WEIGHT: 147.69 LBS | DIASTOLIC BLOOD PRESSURE: 72 MMHG | HEART RATE: 81 BPM | SYSTOLIC BLOOD PRESSURE: 104 MMHG | HEIGHT: 55 IN

## 2025-08-20 DIAGNOSIS — M51.369 ANNULAR TEAR OF LUMBAR DISC: ICD-10-CM

## 2025-08-20 DIAGNOSIS — M47.816 LUMBAR SPONDYLOSIS: Primary | ICD-10-CM

## 2025-08-20 DIAGNOSIS — M54.9 DORSALGIA, UNSPECIFIED: ICD-10-CM

## 2025-08-20 DIAGNOSIS — I95.1 ORTHOSTATIC HYPOTENSION: ICD-10-CM

## 2025-08-20 LAB
AORTIC SIZE INDEX (SOV): 1.6 CM/M2
AORTIC SIZE INDEX: 1.9 CM/M2
ASCENDING AORTA: 2.9 CM
AV AREA BY CONTINUOUS VTI: 2 CM2
AV INDEX (PROSTH): 0.84
AV LVOT MEAN GRADIENT: 2 MMHG
AV LVOT PEAK GRADIENT: 4 MMHG
AV MEAN GRADIENT: 3 MMHG
AV PEAK GRADIENT: 5 MMHG
AV VALVE AREA BY VELOCITY RATIO: 2.1 CM²
AV VALVE AREA: 1.9 CM2
AV VELOCITY RATIO: 0.91
BSA FOR ECHO PROCEDURE: 1.61 M2
CV ECHO LV RWT: 0.46 CM
DOP CALC AO PEAK VEL: 1.1 M/S
DOP CALC AO VTI: 23.5 CM
DOP CALC LVOT AREA: 2.3 CM2
DOP CALC LVOT DIAMETER: 1.7 CM
DOP CALC LVOT PEAK VEL: 1 M/S
DOP CALCLVOT PEAK VEL VTI: 19.7 CM
E WAVE DECELERATION TIME: 108 MS
E/A RATIO: 1.3
E/E' RATIO: 8 M/S
ECHO EF ESTIMATED: 56 %
ECHO LV POSTERIOR WALL: 0.9 CM (ref 0.6–1.1)
EJECTION FRACTION: 63 %
FRACTIONAL SHORTENING: 28.2 % (ref 28–44)
INTERVENTRICULAR SEPTUM: 0.9 CM (ref 0.6–1.1)
IVC DIAMETER: 1.26 CM
IVRT: 80 MS
LA MAJOR: 4 CM
LA MINOR: 4.1 CM
LA WIDTH: 3.6 CM
LEFT ATRIUM SIZE: 3.4 CM
LEFT ATRIUM VOLUME INDEX MOD: 22 ML/M2
LEFT ATRIUM VOLUME INDEX: 27 ML/M2
LEFT ATRIUM VOLUME MOD: 34 ML
LEFT ATRIUM VOLUME: 42 CM3
LEFT INTERNAL DIMENSION IN SYSTOLE: 2.8 CM (ref 2.1–4)
LEFT VENTRICLE DIASTOLIC VOLUME INDEX: 44.16 ML/M2
LEFT VENTRICLE DIASTOLIC VOLUME: 68 ML
LEFT VENTRICLE MASS INDEX: 68.4 G/M2
LEFT VENTRICLE SYSTOLIC VOLUME INDEX: 18.8 ML/M2
LEFT VENTRICLE SYSTOLIC VOLUME: 29 ML
LEFT VENTRICULAR INTERNAL DIMENSION IN DIASTOLE: 3.9 CM (ref 3.5–6)
LEFT VENTRICULAR MASS: 105.3 G
LV LATERAL E/E' RATIO: 6.4
LV SEPTAL E/E' RATIO: 10
Lab: 1.8 CM/M
Lab: 2.1 CM/M
MV A" WAVE DURATION": 104.66 MS
MV PEAK A VEL: 0.69 M/S
MV PEAK E VEL: 0.9 M/S
OHS CV CPX PATIENT HEIGHT IN: 55.12
OHS CV RV/LV RATIO: 0.82 CM
PISA TR MAX VEL: 2.6 M/S
PULM VEIN A" WAVE DURATION": 104.66 MS
PULM VEIN S/D RATIO: 1.29
PULMONIC VEIN PEAK A VELOCITY: 0.2 M/S
PV PEAK D VEL: 0.48 M/S
PV PEAK S VEL: 0.62 M/S
RA MAJOR: 3.93 CM
RA PRESSURE ESTIMATED: 3 MMHG
RA WIDTH: 3.09 CM
RIGHT ATRIAL AREA: 9.7 CM2
RIGHT VENTRICLE DIASTOLIC BASEL DIMENSION: 3.2 CM
RV TB RVSP: 6 MMHG
RV TISSUE DOPPLER FREE WALL SYSTOLIC VELOCITY 1 (APICAL 4 CHAMBER VIEW): 11.25 CM/S
SINUS: 2.5 CM
STJ: 2.6 CM
TDI LATERAL: 0.14 M/S
TDI SEPTAL: 0.09 M/S
TDI: 0.12 M/S
TRICUSPID ANNULAR PLANE SYSTOLIC EXCURSION: 2.3 CM
TV PEAK GRADIENT: 27 MMHG
TV REST PULMONARY ARTERY PRESSURE: 30 MMHG
Z-SCORE OF LEFT VENTRICULAR DIMENSION IN END DIASTOLE: -1.38
Z-SCORE OF LEFT VENTRICULAR DIMENSION IN END SYSTOLE: 0.06

## 2025-08-20 PROCEDURE — 93306 TTE W/DOPPLER COMPLETE: CPT

## 2025-08-20 PROCEDURE — 93306 TTE W/DOPPLER COMPLETE: CPT | Mod: 26,,, | Performed by: INTERNAL MEDICINE

## 2025-08-21 DIAGNOSIS — M47.816 LUMBAR SPONDYLOSIS: Primary | ICD-10-CM

## (undated) DEVICE — KIT WING PAD POSITIONING

## (undated) DEVICE — OBTURATOR BLADELESS 8MM XI CLR

## (undated) DEVICE — SOL IRRI STRL WATER 1000ML

## (undated) DEVICE — SOL NORMAL USPCA 0.9%

## (undated) DEVICE — COVER TIP CURVED SCISSORS XI

## (undated) DEVICE — UNDERGLOVE BIOGEL PI SZ 6.5 LF

## (undated) DEVICE — SOL POVIDONE SCRUB IODINE 4 OZ

## (undated) DEVICE — DRAPE ARM DAVINCI XI

## (undated) DEVICE — SUT MCRYL PLUS 4-0 PS2 27IN

## (undated) DEVICE — SOL POVIDONE PREP IODINE 4 OZ

## (undated) DEVICE — TRAY DO THE ROBOT

## (undated) DEVICE — SYR 10CC LUER LOCK

## (undated) DEVICE — INSERT CUSHIONPRONE VIEW LARGE

## (undated) DEVICE — NDL HYPO REG 25G X 1 1/2

## (undated) DEVICE — SYS SEE SHARP SCP ANTIFG LNG

## (undated) DEVICE — DRAPE COLUMN DAVINCI XI

## (undated) DEVICE — JELLY SURGILUBE 5GR

## (undated) DEVICE — SET TRI-LUMEN FILTERED TUBE

## (undated) DEVICE — GLOVE BIOGEL SKINSENSE PI 6.5

## (undated) DEVICE — TIP RUMI KOH-EFFICIENT 3.5

## (undated) DEVICE — ELECTRODE REM PLYHSV RETURN 9

## (undated) DEVICE — DEVICE SNAPSECURE FOL CATH

## (undated) DEVICE — NDL INSUFFLATION VERRES 120MM

## (undated) DEVICE — TIP RUMI BLUE DISPOSABLE 5/BX

## (undated) DEVICE — SOL ELECTROLUBE ANTI-STIC

## (undated) DEVICE — SEAL CANN UNIVERSAL 5-12MM

## (undated) DEVICE — SUT VICRYL PLUS 0 CT1 36IN

## (undated) DEVICE — IRRIGATOR ENDOSCOPY DISP.

## (undated) DEVICE — GOWN NONREINF SET-IN SLV XL

## (undated) DEVICE — PORT ACCESS 8MM W/120MM LOW